# Patient Record
Sex: FEMALE | Race: WHITE | Employment: UNEMPLOYED | URBAN - METROPOLITAN AREA
[De-identification: names, ages, dates, MRNs, and addresses within clinical notes are randomized per-mention and may not be internally consistent; named-entity substitution may affect disease eponyms.]

---

## 2017-08-10 ENCOUNTER — OFFICE VISIT (OUTPATIENT)
Dept: PRIMARY CARE CLINIC | Age: 42
End: 2017-08-10
Payer: MEDICARE

## 2017-08-10 VITALS
HEART RATE: 77 BPM | DIASTOLIC BLOOD PRESSURE: 77 MMHG | SYSTOLIC BLOOD PRESSURE: 102 MMHG | WEIGHT: 161.6 LBS | TEMPERATURE: 97.8 F | BODY MASS INDEX: 26.89 KG/M2 | RESPIRATION RATE: 20 BRPM

## 2017-08-10 DIAGNOSIS — E78.5 DYSLIPIDEMIA: ICD-10-CM

## 2017-08-10 DIAGNOSIS — K21.9 GASTROESOPHAGEAL REFLUX DISEASE, ESOPHAGITIS PRESENCE NOT SPECIFIED: ICD-10-CM

## 2017-08-10 DIAGNOSIS — R13.10 DYSPHAGIA, UNSPECIFIED TYPE: Primary | ICD-10-CM

## 2017-08-10 DIAGNOSIS — Z23 NEED FOR 23-POLYVALENT PNEUMOCOCCAL POLYSACCHARIDE VACCINE: ICD-10-CM

## 2017-08-10 DIAGNOSIS — Z90.710 H/O: HYSTERECTOMY: ICD-10-CM

## 2017-08-10 DIAGNOSIS — Z12.39 BREAST CANCER SCREENING: ICD-10-CM

## 2017-08-10 DIAGNOSIS — Z72.0 TOBACCO ABUSE: ICD-10-CM

## 2017-08-10 DIAGNOSIS — Z80.0 FAMILY HISTORY OF THROAT CANCER: ICD-10-CM

## 2017-08-10 PROCEDURE — 90732 PPSV23 VACC 2 YRS+ SUBQ/IM: CPT | Performed by: NURSE PRACTITIONER

## 2017-08-10 PROCEDURE — 99213 OFFICE O/P EST LOW 20 MIN: CPT | Performed by: NURSE PRACTITIONER

## 2017-08-10 PROCEDURE — 90471 IMMUNIZATION ADMIN: CPT | Performed by: NURSE PRACTITIONER

## 2017-08-10 RX ORDER — OMEPRAZOLE 20 MG/1
20 CAPSULE, DELAYED RELEASE ORAL DAILY
Qty: 30 CAPSULE | Refills: 3 | Status: SHIPPED | OUTPATIENT
Start: 2017-08-10 | End: 2017-09-20 | Stop reason: SDUPTHER

## 2017-08-10 RX ORDER — BUPROPION HYDROCHLORIDE 150 MG/1
150 TABLET, EXTENDED RELEASE ORAL 2 TIMES DAILY
Qty: 60 TABLET | Refills: 3 | Status: SHIPPED | OUTPATIENT
Start: 2017-08-10 | End: 2017-09-20 | Stop reason: SDUPTHER

## 2017-08-10 ASSESSMENT — ENCOUNTER SYMPTOMS
GASTROINTESTINAL NEGATIVE: 1
CHEST TIGHTNESS: 0
STRIDOR: 0
COUGH: 1
WHEEZING: 0
EYES NEGATIVE: 1
SORE THROAT: 0
SHORTNESS OF BREATH: 0
TROUBLE SWALLOWING: 1

## 2017-08-10 ASSESSMENT — PATIENT HEALTH QUESTIONNAIRE - PHQ9
SUM OF ALL RESPONSES TO PHQ9 QUESTIONS 1 & 2: 0
2. FEELING DOWN, DEPRESSED OR HOPELESS: 0
SUM OF ALL RESPONSES TO PHQ QUESTIONS 1-9: 0
1. LITTLE INTEREST OR PLEASURE IN DOING THINGS: 0

## 2017-09-18 ENCOUNTER — TELEPHONE (OUTPATIENT)
Dept: PRIMARY CARE CLINIC | Age: 42
End: 2017-09-18

## 2017-09-19 ENCOUNTER — HOSPITAL ENCOUNTER (OUTPATIENT)
Age: 42
Discharge: HOME | End: 2017-09-19
Payer: MEDICARE

## 2017-09-19 DIAGNOSIS — E78.5 DYSLIPIDEMIA: ICD-10-CM

## 2017-09-19 DIAGNOSIS — Z72.0 TOBACCO ABUSE: ICD-10-CM

## 2017-09-19 LAB
ABSOLUTE EOS #: 0.4 K/UL (ref 0–0.4)
ABSOLUTE LYMPH #: 2.1 K/UL (ref 1–4.8)
ABSOLUTE MONO #: 0.5 K/UL (ref 0–1)
ALBUMIN SERPL-MCNC: 4.2 G/DL (ref 3.5–5.2)
ALBUMIN/GLOBULIN RATIO: 1.4 (ref 1–2.5)
ALP BLD-CCNC: 101 U/L (ref 35–104)
ALT SERPL-CCNC: 14 U/L (ref 5–33)
ANION GAP SERPL CALCULATED.3IONS-SCNC: 11 MMOL/L (ref 9–17)
AST SERPL-CCNC: 17 U/L
BASOPHILS # BLD: 1 %
BASOPHILS ABSOLUTE: 0.1 K/UL (ref 0–0.2)
BILIRUB SERPL-MCNC: 0.54 MG/DL (ref 0.3–1.2)
BILIRUBIN URINE: NEGATIVE
BUN BLDV-MCNC: 8 MG/DL (ref 6–20)
BUN/CREAT BLD: 10 (ref 9–20)
CALCIUM SERPL-MCNC: 9.4 MG/DL (ref 8.6–10.4)
CHLORIDE BLD-SCNC: 107 MMOL/L (ref 98–107)
CHOLESTEROL/HDL RATIO: 5.4
CHOLESTEROL: 225 MG/DL
CO2: 25 MMOL/L (ref 20–31)
COLOR: YELLOW
COMMENT UA: NORMAL
CREAT SERPL-MCNC: 0.81 MG/DL (ref 0.5–0.9)
CREATININE URINE: 76.6 MG/DL (ref 28–217)
DIFFERENTIAL TYPE: NORMAL
EOSINOPHILS RELATIVE PERCENT: 5 %
GFR AFRICAN AMERICAN: >60 ML/MIN
GFR NON-AFRICAN AMERICAN: >60 ML/MIN
GFR SERPL CREATININE-BSD FRML MDRD: NORMAL ML/MIN/{1.73_M2}
GFR SERPL CREATININE-BSD FRML MDRD: NORMAL ML/MIN/{1.73_M2}
GLUCOSE BLD-MCNC: 91 MG/DL (ref 70–99)
GLUCOSE URINE: NEGATIVE
HCT VFR BLD CALC: 39.8 % (ref 36–46)
HDLC SERPL-MCNC: 42 MG/DL
HEMOGLOBIN: 13.5 G/DL (ref 12–16)
KETONES, URINE: NEGATIVE
LDL CHOLESTEROL: 155 MG/DL (ref 0–130)
LEUKOCYTE ESTERASE, URINE: NEGATIVE
LYMPHOCYTES # BLD: 29 %
MCH RBC QN AUTO: 31.4 PG (ref 26–34)
MCHC RBC AUTO-ENTMCNC: 33.9 G/DL (ref 31–37)
MCV RBC AUTO: 92.6 FL (ref 80–100)
MICROALBUMIN/CREAT 24H UR: <12 MG/L
MICROALBUMIN/CREAT UR-RTO: 16 MCG/MG CREAT
MONOCYTES # BLD: 7 %
NITRITE, URINE: NEGATIVE
PDW BLD-RTO: 13.8 % (ref 12.1–15.2)
PH UA: 7 (ref 5–9)
PLATELET # BLD: 249 K/UL (ref 140–450)
PLATELET ESTIMATE: NORMAL
PMV BLD AUTO: 8.4 FL (ref 6–12)
POTASSIUM SERPL-SCNC: 5 MMOL/L (ref 3.7–5.3)
PROTEIN UA: NEGATIVE
RBC # BLD: 4.3 M/UL (ref 4–5.2)
RBC # BLD: NORMAL 10*6/UL
SEG NEUTROPHILS: 58 %
SEGMENTED NEUTROPHILS ABSOLUTE COUNT: 4.2 K/UL (ref 1.8–7.7)
SODIUM BLD-SCNC: 143 MMOL/L (ref 135–144)
SPECIFIC GRAVITY UA: 1.01 (ref 1.01–1.02)
TOTAL PROTEIN: 7.3 G/DL (ref 6.4–8.3)
TRIGL SERPL-MCNC: 141 MG/DL
TSH SERPL DL<=0.05 MIU/L-ACNC: 1.4 MIU/L (ref 0.3–5)
TURBIDITY: CLEAR
URINE HGB: NEGATIVE
UROBILINOGEN, URINE: NORMAL
VLDLC SERPL CALC-MCNC: ABNORMAL MG/DL (ref 1–30)
WBC # BLD: 7.2 K/UL (ref 3.5–11)
WBC # BLD: NORMAL 10*3/UL

## 2017-09-19 PROCEDURE — 82570 ASSAY OF URINE CREATININE: CPT

## 2017-09-19 PROCEDURE — 80053 COMPREHEN METABOLIC PANEL: CPT

## 2017-09-19 PROCEDURE — 85025 COMPLETE CBC W/AUTO DIFF WBC: CPT

## 2017-09-19 PROCEDURE — 36415 COLL VENOUS BLD VENIPUNCTURE: CPT

## 2017-09-19 PROCEDURE — 81003 URINALYSIS AUTO W/O SCOPE: CPT

## 2017-09-19 PROCEDURE — 82043 UR ALBUMIN QUANTITATIVE: CPT

## 2017-09-19 PROCEDURE — 80061 LIPID PANEL: CPT

## 2017-09-19 PROCEDURE — 84443 ASSAY THYROID STIM HORMONE: CPT

## 2017-09-20 ENCOUNTER — OFFICE VISIT (OUTPATIENT)
Dept: PRIMARY CARE CLINIC | Age: 42
End: 2017-09-20
Payer: MEDICARE

## 2017-09-20 VITALS
RESPIRATION RATE: 20 BRPM | TEMPERATURE: 97.5 F | DIASTOLIC BLOOD PRESSURE: 76 MMHG | HEART RATE: 71 BPM | SYSTOLIC BLOOD PRESSURE: 113 MMHG | WEIGHT: 162.1 LBS | BODY MASS INDEX: 26.97 KG/M2

## 2017-09-20 DIAGNOSIS — R13.10 DYSPHAGIA, UNSPECIFIED TYPE: ICD-10-CM

## 2017-09-20 DIAGNOSIS — E78.5 DYSLIPIDEMIA: Primary | ICD-10-CM

## 2017-09-20 DIAGNOSIS — J45.20 MILD INTERMITTENT ASTHMA WITHOUT COMPLICATION: ICD-10-CM

## 2017-09-20 DIAGNOSIS — K21.9 GASTROESOPHAGEAL REFLUX DISEASE, ESOPHAGITIS PRESENCE NOT SPECIFIED: ICD-10-CM

## 2017-09-20 DIAGNOSIS — Z72.0 TOBACCO ABUSE: ICD-10-CM

## 2017-09-20 PROCEDURE — 99213 OFFICE O/P EST LOW 20 MIN: CPT | Performed by: NURSE PRACTITIONER

## 2017-09-20 RX ORDER — BUPROPION HYDROCHLORIDE 150 MG/1
150 TABLET, EXTENDED RELEASE ORAL 2 TIMES DAILY
Qty: 60 TABLET | Refills: 3 | Status: SHIPPED | OUTPATIENT
Start: 2017-09-20 | End: 2018-08-01 | Stop reason: ALTCHOICE

## 2017-09-20 RX ORDER — OMEPRAZOLE 20 MG/1
20 CAPSULE, DELAYED RELEASE ORAL DAILY
Qty: 30 CAPSULE | Refills: 3 | Status: SHIPPED | OUTPATIENT
Start: 2017-09-20 | End: 2017-12-10 | Stop reason: ALTCHOICE

## 2017-09-20 RX ORDER — ALBUTEROL SULFATE 90 UG/1
2 AEROSOL, METERED RESPIRATORY (INHALATION) EVERY 6 HOURS PRN
Qty: 1 INHALER | Refills: 3 | Status: SHIPPED | OUTPATIENT
Start: 2017-09-20 | End: 2019-12-12

## 2017-09-20 ASSESSMENT — ENCOUNTER SYMPTOMS
RESPIRATORY NEGATIVE: 1
ABDOMINAL PAIN: 0
EYES NEGATIVE: 1
SORE THROAT: 0
DIARRHEA: 0
SHORTNESS OF BREATH: 0
TROUBLE SWALLOWING: 1
NAUSEA: 0
COUGH: 0
VOMITING: 0
SWOLLEN GLANDS: 0
VOICE CHANGE: 0
WHEEZING: 0
CONSTIPATION: 0
GASTROINTESTINAL NEGATIVE: 1

## 2017-11-24 ENCOUNTER — APPOINTMENT (OUTPATIENT)
Dept: GENERAL RADIOLOGY | Age: 42
End: 2017-11-24
Payer: MEDICARE

## 2017-11-24 ENCOUNTER — HOSPITAL ENCOUNTER (EMERGENCY)
Age: 42
Discharge: HOME | End: 2017-11-24
Payer: MEDICARE

## 2017-11-24 VITALS
HEART RATE: 81 BPM | TEMPERATURE: 98 F | DIASTOLIC BLOOD PRESSURE: 71 MMHG | OXYGEN SATURATION: 95 % | SYSTOLIC BLOOD PRESSURE: 110 MMHG | RESPIRATION RATE: 16 BRPM

## 2017-11-24 DIAGNOSIS — J40 BRONCHITIS: Primary | ICD-10-CM

## 2017-11-24 PROCEDURE — 99283 EMERGENCY DEPT VISIT LOW MDM: CPT

## 2017-11-24 PROCEDURE — 71020 XR CHEST STANDARD TWO VW: CPT

## 2017-11-24 RX ORDER — PREDNISONE 10 MG/1
20 TABLET ORAL 2 TIMES DAILY
Qty: 20 TABLET | Refills: 0 | Status: SHIPPED | OUTPATIENT
Start: 2017-11-24 | End: 2017-11-29

## 2017-11-24 RX ORDER — AZITHROMYCIN 500 MG/1
500 TABLET, FILM COATED ORAL DAILY
Qty: 10 TABLET | Refills: 0 | Status: SHIPPED | OUTPATIENT
Start: 2017-11-24 | End: 2017-12-04

## 2017-11-24 ASSESSMENT — ENCOUNTER SYMPTOMS
TROUBLE SWALLOWING: 0
COUGH: 1
SINUS PAIN: 0
SINUS PRESSURE: 0
ABDOMINAL DISTENTION: 0
SHORTNESS OF BREATH: 1
DIARRHEA: 0
NAUSEA: 0
VOMITING: 0
SORE THROAT: 1
WHEEZING: 1
EYE PAIN: 0
CONSTIPATION: 0
ABDOMINAL PAIN: 0

## 2017-11-24 ASSESSMENT — PAIN DESCRIPTION - PAIN TYPE: TYPE: ACUTE PAIN

## 2017-11-24 ASSESSMENT — PAIN SCALES - GENERAL: PAINLEVEL_OUTOF10: 5

## 2017-11-24 ASSESSMENT — PAIN DESCRIPTION - LOCATION: LOCATION: RIB CAGE

## 2017-11-25 NOTE — ED PROVIDER NOTES
vaginally daily. , Vaginal, DAILY Starting 12/23/2014, Until Discontinued, Disp-1 Tube, R-3, Normal      FLUVIRIN PRESERVATIVE FREE SUSP R-0             ALLERGIES     Codeine    FAMILY HISTORY       Family History   Problem Relation Age of Onset    Diabetes Mother     Thyroid Disease Mother     Arthritis Mother     Cancer Father     Stroke Father     Asthma Father     Emphysema Father     Seizures Sister         SOCIAL HISTORY       Social History     Social History    Marital status:      Spouse name: N/A    Number of children: N/A    Years of education: N/A     Social History Main Topics    Smoking status: Current Every Day Smoker     Packs/day: 0.50     Years: 11.00     Types: Cigarettes    Smokeless tobacco: Never Used    Alcohol use No    Drug use: No    Sexual activity: Not Asked     Other Topics Concern    None     Social History Narrative    None       REVIEW OF SYSTEMS       Review of Systems   Constitutional: Positive for activity change and fatigue. Negative for chills, fever and unexpected weight change. HENT: Positive for congestion and sore throat. Negative for ear pain, sinus pain, sinus pressure and trouble swallowing. Eyes: Negative for pain. Respiratory: Positive for cough, shortness of breath and wheezing. Cardiovascular: Negative for chest pain and palpitations. Gastrointestinal: Negative for abdominal distention, abdominal pain, constipation, diarrhea, nausea and vomiting. Endocrine: Negative for polydipsia and polyuria. Genitourinary: Negative for difficulty urinating, dysuria, flank pain, frequency, hematuria, pelvic pain, urgency, vaginal bleeding and vaginal discharge. Musculoskeletal: Negative for myalgias. Skin: Negative for rash. Allergic/Immunologic: Negative for immunocompromised state. Neurological: Negative for dizziness and headaches. Hematological: Negative for adenopathy. Does not bruise/bleed easily.    Psychiatric/Behavioral: cardiologist.    RADIOLOGY: (none if blank)   Interpretation per the Radiologist below, if available at the time of this note:    XR CHEST STANDARD (2 VW)   Final Result   Impression:     Unremarkable chest x-ray. Electronically Signed By: Brie Gomez   on  11/24/2017  19:39          LABS:  Labs Reviewed - No data to display    All other labs were within normal range or not returned as of this dictation. EMERGENCY DEPARTMENT COURSE and Medical Decision Making:     MDM/  ED Course      CXR negative for pneumonia. Will treat for bronchitis with steroids and Z mendez. Pt has inhaler at home, instructed to use as needed. Recommended she quit smoking. F/u with PCP. Return to ED if symptoms worsen. Strict return precautions and follow up instructions were discussed with the patient with which the patient agrees    CONSULTS: (None if blank)  None    Procedures: (None if blank)       CLINICAL IMPRESSION      1.  Bronchitis          DISPOSITION/PLAN   DISPOSITION Decision to Discharge    PATIENT REFERRED TO:  Chaparro Franco NP  [address] [place] [address]  [phone]      call to schedule follow up in 3-5 days      DISCHARGE MEDICATIONS:  Discharge Medication List as of 11/24/2017  7:56 PM      START taking these medications    Details   azithromycin (ZITHROMAX TRI-MENDEZ) 500 MG tablet Take 1 tablet by mouth daily for 10 days, Disp-10 tablet, R-0Print      predniSONE (DELTASONE) 10 MG tablet Take 2 tablets by mouth 2 times daily for 5 days, Disp-20 tablet, R-0Print                    (Please note that portions of this note were completed with a voice recognition program.  Efforts were made to edit the dictations but occasionally words are mis-transcribed.)      Electronically signed by Елена Akhtar PA-C on 11/24/17 at 8:00 PM                Елена Akhtar PA-C  11/24/17 2003       Елена Akhtar PA-C  11/24/17 2004

## 2017-12-10 ENCOUNTER — APPOINTMENT (OUTPATIENT)
Dept: GENERAL RADIOLOGY | Age: 42
End: 2017-12-10
Payer: MEDICARE

## 2017-12-10 ENCOUNTER — HOSPITAL ENCOUNTER (EMERGENCY)
Age: 42
Discharge: HOME | End: 2017-12-10
Payer: MEDICARE

## 2017-12-10 VITALS
HEART RATE: 75 BPM | OXYGEN SATURATION: 99 % | DIASTOLIC BLOOD PRESSURE: 68 MMHG | SYSTOLIC BLOOD PRESSURE: 99 MMHG | WEIGHT: 165 LBS | BODY MASS INDEX: 27.46 KG/M2 | RESPIRATION RATE: 16 BRPM | TEMPERATURE: 97.1 F

## 2017-12-10 DIAGNOSIS — J40 BRONCHITIS: ICD-10-CM

## 2017-12-10 DIAGNOSIS — R07.89 CHEST WALL PAIN: Primary | ICD-10-CM

## 2017-12-10 DIAGNOSIS — R09.1 PLEURISY: ICD-10-CM

## 2017-12-10 LAB
ABSOLUTE EOS #: 0.5 K/UL (ref 0–0.4)
ABSOLUTE IMMATURE GRANULOCYTE: ABNORMAL K/UL (ref 0–0.3)
ABSOLUTE LYMPH #: 1.9 K/UL (ref 1–4.8)
ABSOLUTE MONO #: 0.4 K/UL (ref 0–1)
ANION GAP SERPL CALCULATED.3IONS-SCNC: 10 MMOL/L (ref 9–17)
BASOPHILS # BLD: 0 % (ref 0–2)
BASOPHILS ABSOLUTE: 0 K/UL (ref 0–0.2)
BNP INTERPRETATION: NORMAL
BUN BLDV-MCNC: 11 MG/DL (ref 6–20)
BUN/CREAT BLD: 13 (ref 9–20)
CALCIUM SERPL-MCNC: 9 MG/DL (ref 8.6–10.4)
CHLORIDE BLD-SCNC: 104 MMOL/L (ref 98–107)
CO2: 27 MMOL/L (ref 20–31)
CREAT SERPL-MCNC: 0.83 MG/DL (ref 0.5–0.9)
D-DIMER QUANTITATIVE: 0.3 MG/L FEU (ref 0.19–0.5)
DIFFERENTIAL TYPE: ABNORMAL
EKG ATRIAL RATE: 61 BPM
EKG ATRIAL RATE: 66 BPM
EKG P AXIS: 41 DEGREES
EKG P AXIS: 48 DEGREES
EKG P-R INTERVAL: 154 MS
EKG P-R INTERVAL: 156 MS
EKG Q-T INTERVAL: 404 MS
EKG Q-T INTERVAL: 440 MS
EKG QRS DURATION: 82 MS
EKG QRS DURATION: 84 MS
EKG QTC CALCULATION (BAZETT): 423 MS
EKG QTC CALCULATION (BAZETT): 442 MS
EKG R AXIS: 36 DEGREES
EKG R AXIS: 40 DEGREES
EKG T AXIS: 29 DEGREES
EKG T AXIS: 34 DEGREES
EKG VENTRICULAR RATE: 61 BPM
EKG VENTRICULAR RATE: 66 BPM
EOSINOPHILS RELATIVE PERCENT: 5 % (ref 0–8)
GFR AFRICAN AMERICAN: >60 ML/MIN
GFR NON-AFRICAN AMERICAN: >60 ML/MIN
GFR SERPL CREATININE-BSD FRML MDRD: ABNORMAL ML/MIN/{1.73_M2}
GFR SERPL CREATININE-BSD FRML MDRD: ABNORMAL ML/MIN/{1.73_M2}
GLUCOSE BLD-MCNC: 103 MG/DL (ref 70–99)
HCT VFR BLD CALC: 39.1 % (ref 36–46)
HEMOGLOBIN: 12.9 G/DL (ref 12–16)
IMMATURE GRANULOCYTES: ABNORMAL %
INR BLD: 1 (ref 0.9–1.2)
LYMPHOCYTES # BLD: 20 % (ref 24–44)
MCH RBC QN AUTO: 31.2 PG (ref 26–34)
MCHC RBC AUTO-ENTMCNC: 33.1 G/DL (ref 31–37)
MCV RBC AUTO: 94.2 FL (ref 80–100)
MONOCYTES # BLD: 5 % (ref 0–12)
PARTIAL THROMBOPLASTIN TIME: 29.2 SEC (ref 23.2–34.4)
PDW BLD-RTO: 13.3 % (ref 12.1–15.2)
PLATELET # BLD: 246 K/UL (ref 140–450)
PLATELET ESTIMATE: ABNORMAL
PMV BLD AUTO: 8.1 FL (ref 6–12)
POTASSIUM SERPL-SCNC: 4.3 MMOL/L (ref 3.7–5.3)
PRO-BNP: 164 PG/ML
PROTHROMBIN TIME: 10.7 SEC (ref 9.7–12.2)
RBC # BLD: 4.15 M/UL (ref 4–5.2)
RBC # BLD: ABNORMAL 10*6/UL
SEG NEUTROPHILS: 70 % (ref 36–66)
SEGMENTED NEUTROPHILS ABSOLUTE COUNT: 6.7 K/UL (ref 1.8–7.7)
SODIUM BLD-SCNC: 141 MMOL/L (ref 135–144)
TROPONIN INTERP: NORMAL
TROPONIN INTERP: NORMAL
TROPONIN T: <0.03 NG/ML
TROPONIN T: <0.03 NG/ML
WBC # BLD: 9.6 K/UL (ref 3.5–11)
WBC # BLD: ABNORMAL 10*3/UL

## 2017-12-10 PROCEDURE — 96374 THER/PROPH/DIAG INJ IV PUSH: CPT

## 2017-12-10 PROCEDURE — 83880 ASSAY OF NATRIURETIC PEPTIDE: CPT

## 2017-12-10 PROCEDURE — 6370000000 HC RX 637 (ALT 250 FOR IP): Performed by: PHYSICIAN ASSISTANT

## 2017-12-10 PROCEDURE — 71010 XR CHEST PORTABLE: CPT

## 2017-12-10 PROCEDURE — 99285 EMERGENCY DEPT VISIT HI MDM: CPT

## 2017-12-10 PROCEDURE — 80048 BASIC METABOLIC PNL TOTAL CA: CPT

## 2017-12-10 PROCEDURE — 96372 THER/PROPH/DIAG INJ SC/IM: CPT

## 2017-12-10 PROCEDURE — 84484 ASSAY OF TROPONIN QUANT: CPT

## 2017-12-10 PROCEDURE — 85730 THROMBOPLASTIN TIME PARTIAL: CPT

## 2017-12-10 PROCEDURE — 85610 PROTHROMBIN TIME: CPT

## 2017-12-10 PROCEDURE — 85025 COMPLETE CBC W/AUTO DIFF WBC: CPT

## 2017-12-10 PROCEDURE — 6360000002 HC RX W HCPCS

## 2017-12-10 PROCEDURE — 96375 TX/PRO/DX INJ NEW DRUG ADDON: CPT

## 2017-12-10 PROCEDURE — 93005 ELECTROCARDIOGRAM TRACING: CPT

## 2017-12-10 PROCEDURE — 6360000002 HC RX W HCPCS: Performed by: PHYSICIAN ASSISTANT

## 2017-12-10 PROCEDURE — 85379 FIBRIN DEGRADATION QUANT: CPT

## 2017-12-10 PROCEDURE — 36415 COLL VENOUS BLD VENIPUNCTURE: CPT

## 2017-12-10 RX ORDER — ORPHENADRINE CITRATE 30 MG/ML
60 INJECTION INTRAMUSCULAR; INTRAVENOUS ONCE
Status: COMPLETED | OUTPATIENT
Start: 2017-12-10 | End: 2017-12-10

## 2017-12-10 RX ORDER — AZITHROMYCIN 250 MG/1
TABLET, FILM COATED ORAL
Qty: 1 PACKET | Refills: 0 | Status: SHIPPED | OUTPATIENT
Start: 2017-12-10 | End: 2017-12-20

## 2017-12-10 RX ORDER — METHOCARBAMOL 750 MG/1
750 TABLET, FILM COATED ORAL 4 TIMES DAILY
Qty: 20 TABLET | Refills: 0 | Status: SHIPPED | OUTPATIENT
Start: 2017-12-10 | End: 2017-12-15

## 2017-12-10 RX ORDER — KETOROLAC TROMETHAMINE 30 MG/ML
30 INJECTION, SOLUTION INTRAMUSCULAR; INTRAVENOUS ONCE
Status: COMPLETED | OUTPATIENT
Start: 2017-12-10 | End: 2017-12-10

## 2017-12-10 RX ORDER — MORPHINE SULFATE 10 MG/ML
INJECTION, SOLUTION INTRAMUSCULAR; INTRAVENOUS
Status: COMPLETED
Start: 2017-12-10 | End: 2017-12-10

## 2017-12-10 RX ORDER — AZITHROMYCIN 250 MG/1
500 TABLET, FILM COATED ORAL ONCE
Status: COMPLETED | OUTPATIENT
Start: 2017-12-10 | End: 2017-12-10

## 2017-12-10 RX ORDER — MORPHINE SULFATE 2 MG/ML
4 INJECTION, SOLUTION INTRAMUSCULAR; INTRAVENOUS ONCE
Status: DISCONTINUED | OUTPATIENT
Start: 2017-12-10 | End: 2017-12-10 | Stop reason: HOSPADM

## 2017-12-10 RX ORDER — PREDNISONE 50 MG/1
50 TABLET ORAL DAILY
Qty: 4 TABLET | Refills: 0 | Status: SHIPPED | OUTPATIENT
Start: 2017-12-10 | End: 2018-08-01 | Stop reason: ALTCHOICE

## 2017-12-10 RX ORDER — DIAZEPAM 5 MG/ML
5 INJECTION, SOLUTION INTRAMUSCULAR; INTRAVENOUS ONCE
Status: DISCONTINUED | OUTPATIENT
Start: 2017-12-10 | End: 2017-12-10

## 2017-12-10 RX ORDER — DIAZEPAM 5 MG/ML
5 INJECTION, SOLUTION INTRAMUSCULAR; INTRAVENOUS EVERY 4 HOURS PRN
Status: DISCONTINUED | OUTPATIENT
Start: 2017-12-10 | End: 2017-12-10 | Stop reason: CLARIF

## 2017-12-10 RX ADMIN — KETOROLAC TROMETHAMINE 30 MG: 30 INJECTION, SOLUTION INTRAMUSCULAR at 17:34

## 2017-12-10 RX ADMIN — AZITHROMYCIN 500 MG: 250 TABLET, FILM COATED ORAL at 21:35

## 2017-12-10 RX ADMIN — ORPHENADRINE CITRATE 60 MG: 30 INJECTION INTRAMUSCULAR; INTRAVENOUS at 18:54

## 2017-12-10 RX ADMIN — MORPHINE SULFATE 10 MG: 10 INJECTION, SOLUTION INTRAMUSCULAR; INTRAVENOUS at 19:37

## 2017-12-10 ASSESSMENT — ENCOUNTER SYMPTOMS
RHINORRHEA: 0
SHORTNESS OF BREATH: 1
NAUSEA: 0
BACK PAIN: 0
VOMITING: 0
WHEEZING: 0
EYE PAIN: 0
EYE ITCHING: 0
ABDOMINAL PAIN: 0
SORE THROAT: 0
COUGH: 1
DIARRHEA: 0

## 2017-12-10 ASSESSMENT — PAIN SCALES - GENERAL
PAINLEVEL_OUTOF10: 7
PAINLEVEL_OUTOF10: 8
PAINLEVEL_OUTOF10: 8
PAINLEVEL_OUTOF10: 0

## 2017-12-10 ASSESSMENT — PAIN DESCRIPTION - PAIN TYPE: TYPE: ACUTE PAIN

## 2017-12-10 ASSESSMENT — PAIN DESCRIPTION - ORIENTATION: ORIENTATION: MID;ANTERIOR

## 2017-12-10 ASSESSMENT — PAIN DESCRIPTION - LOCATION: LOCATION: CHEST

## 2017-12-10 ASSESSMENT — PAIN DESCRIPTION - DESCRIPTORS: DESCRIPTORS: STABBING

## 2017-12-10 NOTE — ED NOTES
This RN called and spoke with the pharmacist at [facility]. He states we do not have any Valium in this facility, it is on manufacture's backorder.  Cate Katz is aware       Gal Cornelius RN  12/10/17 [id number]

## 2017-12-10 NOTE — ED PROVIDER NOTES
[facility] ED  eMERGENCY dEPARTMENT eNCOUnter      Pt Name: Dev Condon  MRN: [id number]  [place] 1975  Date of evaluation: 12/10/2017  Provider: Judith Smith PA-C    CHIEF COMPLAINT       Chief Complaint   Patient presents with    Chest Pain     MSCP onset x 3 days. radiating into her left arm and back       HISTORY OF PRESENT ILLNESS    Barb Loaiza is a 38 y.o. female who presents to the emergency department from home With complaints of cough and shortness of breath and chest pain. Patient states she's had a cough for several days. Cough is nonproductive. She states that she started developing chest pain on Friday. This chart. It's worse when she coughs or breathes. She states is also worse when she moves that when she sitting up. Patient denies fevers or chills. Patient denies any vomiting diarrhea. Triage notes and Nursing notes were reviewed by myself. Any discrepancies are addressed above.     PAST MEDICAL HISTORY     Past Medical History:   Diagnosis Date    Depression     Headache(784.0)     Hypotension     Vasodepressor syncope        SURGICAL HISTORY       Past Surgical History:   Procedure Laterality Date    HYSTERECTOMY      TUBAL LIGATION         CURRENT MEDICATIONS       Previous Medications    ALBUTEROL SULFATE HFA (PROAIR HFA) 108 (90 BASE) MCG/ACT INHALER    Inhale 2 puffs into the lungs every 6 hours as needed for Wheezing    BUPROPION (WELLBUTRIN SR) 150 MG EXTENDED RELEASE TABLET    Take 1 tablet by mouth 2 times daily Start with one tab daily for 3 days then increase to one tab twice daily    ESTRADIOL (ESTRACE) 1 MG TABLET    Take 1 tablet by mouth daily    FLUVIRIN PRESERVATIVE FREE SUSP        MELOXICAM (MOBIC) 15 MG TABLET    Take 1 tablet by mouth daily       ALLERGIES     Codeine    FAMILY HISTORY       Family History   Problem Relation Age of Onset    Diabetes Mother     Thyroid Disease Mother     Arthritis Mother     Cancer Father     Stroke Father     Asthma Father     Emphysema Father     Seizures Sister         SOCIAL HISTORY       Social History     Social History    Marital status:      Spouse name: N/A    Number of children: N/A    Years of education: N/A     Social History Main Topics    Smoking status: Current Every Day Smoker     Packs/day: 0.50     Years: 11.00     Types: Cigarettes    Smokeless tobacco: Never Used    Alcohol use No    Drug use: No    Sexual activity: Not Asked     Other Topics Concern    None     Social History Narrative    None       REVIEW OF SYSTEMS       Review of Systems   Constitutional: Negative for appetite change, chills and fever. HENT: Negative for congestion, ear pain, mouth sores, rhinorrhea and sore throat. Eyes: Negative for pain and itching. Respiratory: Positive for cough and shortness of breath. Negative for wheezing. Cardiovascular: Positive for chest pain. Gastrointestinal: Negative for abdominal pain, diarrhea, nausea and vomiting. Endocrine: Negative for polyuria. Genitourinary: Negative for dysuria, frequency, hematuria, urgency and vaginal bleeding. Musculoskeletal: Negative for back pain and neck pain. Skin: Negative for rash. Neurological: Negative for headaches. All other systems reviewed and are negative. Except as noted above the remainder of the review of systems was reviewed and is negative. PHYSICAL EXAM    (up to 7 for level 4, 8 or more for level 5)     ED Triage Vitals [12/10/17 1611]   BP Temp Temp Source Pulse Resp SpO2 Height Weight   -- 97.1 °F (36.2 °C) Tympanic 75 16 99 % -- 165 lb (74.8 kg)       Physical Exam   Constitutional: She is oriented to person, place, and time. She appears well-developed and well-nourished. No distress. HENT:   Head: Normocephalic and atraumatic.    Right Ear: External ear normal.   Left Ear: External ear normal.   Mouth/Throat: Oropharynx is clear and moist.   Eyes: Conjunctivae and EOM are normal. Pupils Eos # 0.50 (*)     All other components within normal limits   APTT   BRAIN NATRIURETIC PEPTIDE   PROTIME-INR   TROPONIN   D-DIMER, QUANTITATIVE   TROPONIN       All other labs were within normal range or not returned as of this dictation. EMERGENCY DEPARTMENT COURSE and Medical Decision Making:     MDM/  ED Course      Patient is a CASSANDRA score of 0 and a heart score of 0. She received Toradol and Norflex for her pain. She did report improvement with medication. She still had some pain. She has received some morphine. Patient was given the results. Strict return precautions and follow up instructions were discussed with the patient with which the patient agrees    CONSULTS: (None if blank)  None    Procedures: (None if blank)       CLINICAL IMPRESSION      1. Chest wall pain    2. Bronchitis    3. Pleurisy          DISPOSITION/PLAN   DISPOSITION Discharge - Pending Orders Complete    PATIENT REFERRED TO:  Alisa Wheeler NP  [address] [place] [id number] [phone]    In 3 days        DISCHARGE MEDICATIONS:  New Prescriptions    AZITHROMYCIN (ZITHROMAX) 250 MG TABLET    Take 2 tablets (500 mg) on Day 1, followed by 1 tablet (250 mg) once daily on Days 2 through 5.     ETODOLAC (LODINE) 300 MG CAPSULE    Take 1 capsule by mouth every 8 hours    METHOCARBAMOL (ROBAXIN) 750 MG TABLET    Take 1 tablet by mouth 4 times daily for 5 days    PREDNISONE (DELTASONE) 50 MG TABLET    Take 1 tablet by mouth daily              (Please note that portions of this note were completed with a voice recognition program.  Efforts were made to edit the dictations but occasionally words are mis-transcribed.)      Walt Smith PA-C (electronically signed)  Attending Emergency Physician            Walt Smith PA-C  12/10/17 0617

## 2017-12-14 ENCOUNTER — OFFICE VISIT (OUTPATIENT)
Dept: PRIMARY CARE CLINIC | Age: 42
End: 2017-12-14
Payer: MEDICARE

## 2017-12-14 VITALS
WEIGHT: 166.6 LBS | HEART RATE: 68 BPM | RESPIRATION RATE: 20 BRPM | TEMPERATURE: 97.8 F | DIASTOLIC BLOOD PRESSURE: 70 MMHG | SYSTOLIC BLOOD PRESSURE: 95 MMHG | BODY MASS INDEX: 27.72 KG/M2

## 2017-12-14 DIAGNOSIS — R09.1 PLEURISY: ICD-10-CM

## 2017-12-14 DIAGNOSIS — Z23 INFLUENZA VACCINE NEEDED: Primary | ICD-10-CM

## 2017-12-14 DIAGNOSIS — J40 BRONCHITIS: ICD-10-CM

## 2017-12-14 PROCEDURE — 90471 IMMUNIZATION ADMIN: CPT | Performed by: NURSE PRACTITIONER

## 2017-12-14 PROCEDURE — G8427 DOCREV CUR MEDS BY ELIG CLIN: HCPCS | Performed by: NURSE PRACTITIONER

## 2017-12-14 PROCEDURE — 4004F PT TOBACCO SCREEN RCVD TLK: CPT | Performed by: NURSE PRACTITIONER

## 2017-12-14 PROCEDURE — 99213 OFFICE O/P EST LOW 20 MIN: CPT | Performed by: NURSE PRACTITIONER

## 2017-12-14 PROCEDURE — 90686 IIV4 VACC NO PRSV 0.5 ML IM: CPT | Performed by: NURSE PRACTITIONER

## 2017-12-14 PROCEDURE — G8419 CALC BMI OUT NRM PARAM NOF/U: HCPCS | Performed by: NURSE PRACTITIONER

## 2017-12-14 PROCEDURE — G8484 FLU IMMUNIZE NO ADMIN: HCPCS | Performed by: NURSE PRACTITIONER

## 2017-12-14 RX ORDER — OMEPRAZOLE 20 MG/1
CAPSULE, DELAYED RELEASE ORAL
Refills: 0 | COMMUNITY
Start: 2017-09-20 | End: 2018-08-01

## 2017-12-14 RX ORDER — PREDNISONE 10 MG/1
TABLET ORAL
COMMUNITY
Start: 2017-11-24 | End: 2018-08-01 | Stop reason: ALTCHOICE

## 2017-12-14 ASSESSMENT — ENCOUNTER SYMPTOMS
TROUBLE SWALLOWING: 0
WHEEZING: 0
GASTROINTESTINAL NEGATIVE: 1
CHEST TIGHTNESS: 0
EYES NEGATIVE: 1
VOMITING: 0
SHORTNESS OF BREATH: 0
COUGH: 1
STRIDOR: 0

## 2017-12-14 NOTE — PROGRESS NOTES
After obtaining consent, and per orders of Jesi Brown CNP. , injection of Influenza vaccine given IM in Right deltoid by Gustavo Saravia. Patient instructed to remain in clinic for 20 minutes afterwards, and to report any adverse reaction to me immediately.

## 2017-12-14 NOTE — PATIENT INSTRUCTIONS
Patient Education        Bronchitis: Care Instructions  Your Care Instructions    Bronchitis is inflammation of the bronchial tubes, which carry air to the lungs. The tubes swell and produce mucus, or phlegm. The mucus and inflamed bronchial tubes make you cough. You may have trouble breathing. Most cases of bronchitis are caused by viruses like those that cause colds. Antibiotics usually do not help and they may be harmful. Bronchitis usually develops rapidly and lasts about 2 to 3 weeks in otherwise healthy people. Follow-up care is a key part of your treatment and safety. Be sure to make and go to all appointments, and call your doctor if you are having problems. It's also a good idea to know your test results and keep a list of the medicines you take. How can you care for yourself at home? · Take all medicines exactly as prescribed. Call your doctor if you think you are having a problem with your medicine. · Get some extra rest.  · Take an over-the-counter pain medicine, such as acetaminophen (Tylenol), ibuprofen (Advil, Motrin), or naproxen (Aleve) to reduce fever and relieve body aches. Read and follow all instructions on the label. · Do not take two or more pain medicines at the same time unless the doctor told you to. Many pain medicines have acetaminophen, which is Tylenol. Too much acetaminophen (Tylenol) can be harmful. · Take an over-the-counter cough medicine that contains dextromethorphan to help quiet a dry, hacking cough so that you can sleep. Avoid cough medicines that have more than one active ingredient. Read and follow all instructions on the label. · Breathe moist air from a humidifier, hot shower, or sink filled with hot water. The heat and moisture will thin mucus so you can cough it out. · Do not smoke. Smoking can make bronchitis worse. If you need help quitting, talk to your doctor about stop-smoking programs and medicines.  These can increase your chances of quitting for doctor now or seek immediate medical care if:  ? · You have a new or higher fever. ? Watch closely for changes in your health, and be sure to contact your doctor if:  ? · You begin to cough up yellow or green mucus. ? · You cough up blood. ? · Your symptoms are not better in 3 or 4 days. Where can you learn more? Go to [email or web address] and sign in to your [employer] account. Enter F346 in the [employer] box to learn more about \"Pleurisy: Care Instructions. \"     If you do not have an account, please click on the \"Sign Up Now\" link. Current as of: May 12, 2017  Content Version: 11.4  © 0654-8025 Healthwise, Incorporated. Care instructions adapted under license by [facility] ([facility]). If you have questions about a medical condition or this instruction, always ask your healthcare professional. [address] any warranty or liability for your use of this information.

## 2017-12-14 NOTE — PROGRESS NOTES
Name: Roseanne Hussein  : 1975         Chief Complaint:     Chief Complaint   Patient presents with    Chest Pain     ER f/u states pain is still in left upper chest area. States pain is constant and gets sharper with ceetain movements. History of Present Illness:      Roseanne Hussein is a 38 y.o.  female who presents with Chest Pain (ER f/u states pain is still in left upper chest area. States pain is constant and gets sharper with ceetain movements. )    Have you seen any other physician or provider since your last visit yes - ER    Have you had any other diagnostic tests since your last visit? yes -     Have you changed or stopped any medications since your last visit including any over-the-counter medicines, vitamins, or herbal medicines? yes - antibiotics and prednisone     Are you taking all your prescribed medications? Yes  If NO, why? -  N/A           Patient Self-Management Goal for this visit. What is your goal for your visit today? - Maintain/Improve health   Barriers to success: none   Plan for overcoming my barriers: N/A      Confidence: 8/10   Date goal set: 17   Date expected to reach goal: 1month      Health Maintenance Due   Topic Date Due    HIV screen  04/10/1988    DTaP/Tdap/Td vaccine (1 - Tdap) 04/10/1992    Flu vaccine (1) 2017     ED 12/10/17 is on Zpack and steroids he shouldn't states it's improving      Chest Pain    This is a new problem. Episode onset: couple weeks 12/10/17. The onset quality is sudden. Episode frequency: ER f/u states pain is still in left upper chest area. States pain is constant and gets sharper with ceetain movements. The problem has been gradually improving. The quality of the pain is described as burning. The pain does not radiate. Associated symptoms include a cough. Pertinent negatives include no dizziness, fever, headaches, irregular heartbeat, lower extremity edema, palpitations, shortness of breath or vomiting.  Associated symptoms comments: Thinks it might be stress related. . Associated with: coughing  Treatments tried: Antibiotics, Prednisone. Prior diagnostic workup includes echocardiogram.         Past Medical History:     Past Medical History:   Diagnosis Date    Depression     Headache(784.0)     Hypotension     Vasodepressor syncope       Reviewed all health maintenance requirements and ordered appropriate tests  Health Maintenance Due   Topic Date Due    HIV screen  04/10/1988    DTaP/Tdap/Td vaccine (1 - Tdap) 04/10/1992    Flu vaccine (1) 09/01/2017       Past Surgical History:     Past Surgical History:   Procedure Laterality Date    HYSTERECTOMY      TUBAL LIGATION          Medications:       Prior to Admission medications    Medication Sig Start Date End Date Taking?  Authorizing Provider   azithromycin (ZITHROMAX) 250 MG tablet Take 2 tablets (500 mg) on Day 1, followed by 1 tablet (250 mg) once daily on Days 2 through 5. 12/10/17 12/20/17 Yes Sada Smith PA-C   etodolac (LODINE) 300 MG capsule Take 1 capsule by mouth every 8 hours 12/10/17  Yes Sada Smith PA-C   methocarbamol (ROBAXIN) 750 MG tablet Take 1 tablet by mouth 4 times daily for 5 days 12/10/17 12/15/17 Yes Sada Smith PA-C   buPROPion [facility] SR) 150 MG extended release tablet Take 1 tablet by mouth 2 times daily Start with one tab daily for 3 days then increase to one tab twice daily 9/20/17  Yes Gregory Marina NP   albuterol sulfate HFA (PROAIR HFA) 108 (90 Base) MCG/ACT inhaler Inhale 2 puffs into the lungs every 6 hours as needed for Wheezing 9/20/17  Yes Gregory Marina NP   estradiol (ESTRACE) 1 MG tablet Take 1 tablet by mouth daily 3/31/16  Yes Mercedez Vázquez MD   FLUVIRIN PRESERVATIVE FREE SUSP  11/1/14  Yes Historical Provider, MD   predniSONE (DELTASONE) 10 MG tablet  11/24/17   Historical Provider, MD   omeprazole (PRILOSEC) 20 MG delayed release capsule take 1 capsule by mouth once daily 9/20/17   Historical Provider, MD toxic. Sitting upright on exam table without distress. Respirations are regular, non labored and quiet. HENT:   Head: Normocephalic and atraumatic. Nose: Nose normal.   Mouth/Throat: Uvula is midline, oropharynx is clear and moist and mucous membranes are normal.   Eyes: Conjunctivae and EOM are normal. Pupils are equal, round, and reactive to light. Neck: Normal range of motion. Neck supple. No tracheal deviation present. No thyromegaly present. Cardiovascular: Normal rate, regular rhythm, normal heart sounds and intact distal pulses. Exam reveals no gallop and no friction rub. No murmur heard. Pulses:       Radial pulses are 2+ on the left side. Pulmonary/Chest: Effort normal and breath sounds normal. No accessory muscle usage. No tachypnea. No respiratory distress. She has no decreased breath sounds. She has no wheezes. She has no rhonchi. She has no rales. No cough, no wheeze, no distress  Breath sounds are clear to auscultation over posterior bilateral fields. Chest expansion is symmetrical with non-restricted air movement. Musculoskeletal: Normal range of motion. She exhibits no edema or tenderness. Lymphadenopathy:     She has no cervical adenopathy. Neurological: She is alert and oriented to person, place, and time. No cranial nerve deficit. She exhibits normal muscle tone. Coordination and gait normal.   Skin: Skin is warm and dry. No rash noted. No erythema. Psychiatric: She has a normal mood and affect. Her speech is normal and behavior is normal. Judgment and thought content normal.   Nursing note and vitals reviewed. Data:     1. Influenza vaccine needed  INFLUENZA, QUADV, 3 YRS AND OLDER, IM, PF, PREFILL SYR OR SDV, 0.5ML (FLUZONE QUADV, PF)   2. Bronchitis      ED F/U 12/10/17   3.  Pleurisy      ED F/U 12/10/17       Lab Results   Component Value Date     12/10/2017    K 4.3 12/10/2017     12/10/2017    CO2 27 12/10/2017    BUN 11 12/10/2017 CREATININE 0.83 12/10/2017    GLUCOSE 103 12/10/2017    PROT 7.3 09/19/2017    LABALBU 4.2 09/19/2017    BILITOT 0.54 09/19/2017    ALKPHOS 101 09/19/2017    AST 17 09/19/2017    ALT 14 09/19/2017     Lab Results   Component Value Date    WBC 9.6 12/10/2017    RBC 4.15 12/10/2017    HGB 12.9 12/10/2017    HCT 39.1 12/10/2017    MCV 94.2 12/10/2017    MCH 31.2 12/10/2017    MCHC 33.1 12/10/2017    RDW 13.3 12/10/2017     12/10/2017    MPV 8.1 12/10/2017     Lab Results   Component Value Date    TSH 1.40 09/19/2017     Lab Results   Component Value Date    CHOL 225 09/19/2017    HDL 42 09/19/2017       Assessment/Plan:   Caregiver/patient informed today if any severe or worsening of symptoms, spikes in fever, difficulty swallowing, respiratory distress to go to ED. Caregiver/patient verbalizes understanding. And tingling antibiotics and steroids as instructed per emergency department. Smoking cessation encouraged. Narrative   FINAL REPORT       EXAM:  XR CHEST PORTABLE       HISTORY:  cp        TECHNIQUE:  upright single view chest       PRIORS:  Comparison is November 24, 2017       FINDINGS:     Cardiac and mediastinal contours are unremarkable.  No focal pulmonary infiltrate is identified.  No pleural fluid collection seen.  Pulmonary vasculature is unremarkable.            Impression   Impression:     Negative single-view chest            Electronically Signed By: Romana Jiménez   on  12/10/2017  16:50         12/10/2017 10:56 PM - Cristhian, [facility] Incoming Ekg Results From [facility]     Component Results     Component Value Ref Range & Units Status Collected Lab   Ventricular Rate 61  BPM Final 12/10/2017  8:10    Atrial Rate 61  BPM Final 12/10/2017  8:10    P-R Interval 156  ms Final 12/10/2017  8:10    QRS Duration 84  ms Final 12/10/2017  8:10    Q-T Interval 440  ms Final 12/10/2017  8:10    QTc Calculation (Bazett) 442  ms Final 12/10/2017  8:10    P [place] 48

## 2018-08-01 ENCOUNTER — HOSPITAL ENCOUNTER (EMERGENCY)
Age: 43
Discharge: HOME | End: 2018-08-01
Payer: MEDICARE

## 2018-08-01 VITALS
TEMPERATURE: 98.2 F | HEART RATE: 51 BPM | OXYGEN SATURATION: 99 % | RESPIRATION RATE: 18 BRPM | SYSTOLIC BLOOD PRESSURE: 96 MMHG | DIASTOLIC BLOOD PRESSURE: 62 MMHG

## 2018-08-01 DIAGNOSIS — M79.10 MYALGIA: Primary | ICD-10-CM

## 2018-08-01 PROCEDURE — 99283 EMERGENCY DEPT VISIT LOW MDM: CPT

## 2018-08-01 PROCEDURE — 6370000000 HC RX 637 (ALT 250 FOR IP): Performed by: PHYSICIAN ASSISTANT

## 2018-08-01 PROCEDURE — 6360000002 HC RX W HCPCS: Performed by: PHYSICIAN ASSISTANT

## 2018-08-01 PROCEDURE — 96372 THER/PROPH/DIAG INJ SC/IM: CPT

## 2018-08-01 RX ORDER — CYCLOBENZAPRINE HCL 10 MG
10 TABLET ORAL 3 TIMES DAILY PRN
Qty: 12 TABLET | Refills: 0 | Status: SHIPPED | OUTPATIENT
Start: 2018-08-01 | End: 2018-08-11

## 2018-08-01 RX ORDER — HYDROCODONE BITARTRATE AND ACETAMINOPHEN 5; 325 MG/1; MG/1
1 TABLET ORAL ONCE
Status: COMPLETED | OUTPATIENT
Start: 2018-08-01 | End: 2018-08-01

## 2018-08-01 RX ORDER — ORPHENADRINE CITRATE 30 MG/ML
60 INJECTION INTRAMUSCULAR; INTRAVENOUS ONCE
Status: COMPLETED | OUTPATIENT
Start: 2018-08-01 | End: 2018-08-01

## 2018-08-01 RX ADMIN — ORPHENADRINE CITRATE 60 MG: 30 INJECTION INTRAMUSCULAR; INTRAVENOUS at 14:52

## 2018-08-01 RX ADMIN — HYDROCODONE BITARTRATE AND ACETAMINOPHEN 1 TABLET: 5; 325 TABLET ORAL at 14:52

## 2018-08-01 ASSESSMENT — ENCOUNTER SYMPTOMS
WHEEZING: 0
RHINORRHEA: 0
SHORTNESS OF BREATH: 0
COUGH: 0
VOMITING: 0
EYE DISCHARGE: 0
CONSTIPATION: 0
NAUSEA: 0
BLOOD IN STOOL: 0
ABDOMINAL PAIN: 0
EYE REDNESS: 0
BACK PAIN: 1
CHEST TIGHTNESS: 0
SORE THROAT: 0
DIARRHEA: 0

## 2018-08-01 ASSESSMENT — PAIN SCALES - GENERAL
PAINLEVEL_OUTOF10: 2
PAINLEVEL_OUTOF10: 5

## 2018-08-01 ASSESSMENT — PAIN DESCRIPTION - PAIN TYPE: TYPE: ACUTE PAIN

## 2018-08-01 ASSESSMENT — PAIN DESCRIPTION - LOCATION: LOCATION: BACK

## 2018-08-01 NOTE — ED PROVIDER NOTES
Musculoskeletal: Positive for back pain. Negative for arthralgias and myalgias. Skin: Negative for pallor and rash. Allergic/Immunologic: Negative for food allergies and immunocompromised state. Neurological: Negative for dizziness, syncope, weakness and light-headedness. Hematological: Negative for adenopathy. Does not bruise/bleed easily. Psychiatric/Behavioral: Negative for behavioral problems and suicidal ideas. The patient is not nervous/anxious. Except as noted above the remainder of the review of systems was reviewed and negative.        PAST MEDICAL HISTORY     Past Medical History:   Diagnosis Date    Depression     Headache(784.0)     Hypotension     Vasodepressor syncope          SURGICAL HISTORY       Past Surgical History:   Procedure Laterality Date    HYSTERECTOMY      TUBAL LIGATION           CURRENT MEDICATIONS       Discharge Medication List as of 8/1/2018  3:38 PM      CONTINUE these medications which have NOT CHANGED    Details   albuterol sulfate HFA (PROAIR HFA) 108 (90 Base) MCG/ACT inhaler Inhale 2 puffs into the lungs every 6 hours as needed for Wheezing, Disp-1 Inhaler, R-3Normal             ALLERGIES     Codeine    FAMILY HISTORY       Family History   Problem Relation Age of Onset    Diabetes Mother     Thyroid Disease Mother     Arthritis Mother     Cancer Father     Stroke Father     Asthma Father     Emphysema Father     Seizures Sister           SOCIAL HISTORY       Social History     Social History    Marital status:      Spouse name: N/A    Number of children: N/A    Years of education: N/A     Social History Main Topics    Smoking status: Current Every Day Smoker     Packs/day: 0.50     Years: 11.00     Types: Cigarettes    Smokeless tobacco: Never Used    Alcohol use No    Drug use: No    Sexual activity: Not Asked     Other Topics Concern    None     Social History Narrative    None       SCREENINGS    Zoar Coma Scale  Eye Opening: Spontaneous  Best Verbal Response: Oriented  Best Motor Response: Obeys commands  Bakari Coma Scale Score: 15        PHYSICAL EXAM    (up to 7 for level 4, 8 or more for level 5)     ED Triage Vitals   BP Temp Temp Source Pulse Resp SpO2 Height Weight   08/01/18 1343 08/01/18 1341 08/01/18 1341 08/01/18 1341 08/01/18 1341 08/01/18 1341 -- --   104/81 98.2 °F (36.8 °C) Tympanic 64 20 97 %         Physical Exam   Constitutional: She is oriented to person, place, and time. She appears well-developed and well-nourished. No distress. HENT:   Head: Normocephalic and atraumatic. Right Ear: External ear normal.   Left Ear: External ear normal.   Mouth/Throat: Oropharynx is clear and moist. No oropharyngeal exudate. Eyes: Conjunctivae and EOM are normal. Pupils are equal, round, and reactive to light. Right eye exhibits no discharge. Left eye exhibits no discharge. No scleral icterus. Neck: Normal range of motion. Neck supple. No tracheal deviation present. Cardiovascular: Normal rate, regular rhythm and intact distal pulses. Exam reveals no gallop and no friction rub. No murmur heard. Pulmonary/Chest: Effort normal and breath sounds normal. No stridor. No respiratory distress. She has no wheezes. She has no rales. She exhibits no tenderness. Abdominal: Soft. Bowel sounds are normal. She exhibits no distension. There is no tenderness. There is no rebound and no guarding. Musculoskeletal: Normal range of motion. She exhibits tenderness. She exhibits no edema or deformity. There is muscular and paraspinous muscular discomfort around the lower T-spine and the upper L-spine. Most nuclear discomfort around the latissimus dorsi on the left. There is no CVA tenderness. There is no abrasions no open wounds. There is no midline bony spinal tenderness. There is no bony step-off. Patient is up and auditory she is able to move all 4 extremities.   Intact distal pulses sensation Refill less than 3 seconds. Neurological: She is alert and oriented to person, place, and time. She has normal reflexes. No cranial nerve deficit. Coordination normal.   Skin: Skin is warm and dry. No rash noted. She is not diaphoretic. No erythema. Psychiatric: She has a normal mood and affect. Her behavior is normal.   Nursing note and vitals reviewed. DIAGNOSTIC RESULTS       EMERGENCY DEPARTMENT COURSE and DIFFERENTIAL DIAGNOSIS/MDM:   Vitals:    Vitals:    08/01/18 1341 08/01/18 1343 08/01/18 1531   BP:  104/81 96/62   Pulse: 64  51   Resp: 20  18   Temp: 98.2 °F (36.8 °C)  98.2 °F (36.8 °C)   TempSrc: Tympanic     SpO2: 97%  99%         MDM  47-year-old female who presents with back discomfort after her son stepped on her back. On exam has some paraspinous discomfort there is no midline bony spinal tenderness. She has intact pulses sensation capillary refill less than 3 seconds she is up and ambulatory. No paresthesias. At this point I think this is likely muscular. And I'm going to treat patient symptomatically. Patient is resting comfortably at this time and in no distress. We discussed at length the patient's diagnosis. Patient understands to follow up with primary care provider in the next 2 days. Patient verbalized understanding of this. We went over medications. Strict return warnings were given. Patient will return to the emergency room as needed with new or worsening complaints. Patient ambulated out of the ER with no distress. Procedures    FINAL IMPRESSION      1.  Myalgia          DISPOSITION/PLAN   DISPOSITION Decision To Discharge 08/01/2018 03:37:39 PM      PATIENT REFERRED TO:  [facility] ED  [address]  [phone]    If symptoms worsen, As needed    Eliezer Gaston, APRN - CNP  [address]  [phone]    Schedule an appointment as soon as possible for a visit in 2 days        DISCHARGE MEDICATIONS:  Discharge Medication List as of 8/1/2018  3:38 PM      START taking these medications    Details   cyclobenzaprine (FLEXERIL) 10 MG tablet Take 1 tablet by mouth 3 times daily as needed for Muscle spasms, Disp-12 tablet, R-0Print                    Summation      Patient Course:      ED Medications administered this visit:    Medications   orphenadrine (NORFLEX) injection 60 mg (60 mg Intramuscular Given 8/1/18 1452)   HYDROcodone-acetaminophen (NORCO) 5-325 MG per tablet 1 tablet (1 tablet Oral Given 8/1/18 1452)       New Prescriptions from this visit:    Discharge Medication List as of 8/1/2018  3:38 PM      START taking these medications    Details   cyclobenzaprine (FLEXERIL) 10 MG tablet Take 1 tablet by mouth 3 times daily as needed for Muscle spasms, Disp-12 tablet, R-0Print             Follow-up:  [facility] ED  [address]  [phone]    If symptoms worsen, As needed    Jennifer Chester, APRN - CNP  [address]  [phone]    Schedule an appointment as soon as possible for a visit in 2 days          Final Impression:   1.  Myalgia               (Please note that portions of this note were completed with a voice recognition program.  Efforts were made to edit the dictations but occasionally words are mis-transcribed.)            Frandy Fernando PA-C  08/01/18 [id number]

## 2018-08-02 ENCOUNTER — CARE COORDINATION (OUTPATIENT)
Dept: CARE COORDINATION | Age: 43
End: 2018-08-02

## 2018-09-28 ENCOUNTER — TELEPHONE (OUTPATIENT)
Dept: PRIMARY CARE CLINIC | Age: 43
End: 2018-09-28

## 2018-09-28 NOTE — TELEPHONE ENCOUNTER
Writer left message reminding pt of lipid panel due and to make sure she is fasting for it & to call the office with any questions.

## 2019-12-12 ENCOUNTER — OFFICE VISIT (OUTPATIENT)
Dept: PRIMARY CARE CLINIC | Age: 44
End: 2019-12-12
Payer: MEDICARE

## 2019-12-12 VITALS
HEIGHT: 65 IN | SYSTOLIC BLOOD PRESSURE: 92 MMHG | DIASTOLIC BLOOD PRESSURE: 59 MMHG | WEIGHT: 153.9 LBS | BODY MASS INDEX: 25.64 KG/M2 | TEMPERATURE: 98.4 F | HEART RATE: 80 BPM | RESPIRATION RATE: 14 BRPM

## 2019-12-12 DIAGNOSIS — J45.20 MILD INTERMITTENT ASTHMA WITHOUT COMPLICATION: Primary | ICD-10-CM

## 2019-12-12 DIAGNOSIS — E78.5 DYSLIPIDEMIA: ICD-10-CM

## 2019-12-12 DIAGNOSIS — F34.1 DYSTHYMIA: ICD-10-CM

## 2019-12-12 PROCEDURE — 99214 OFFICE O/P EST MOD 30 MIN: CPT | Performed by: NURSE PRACTITIONER

## 2019-12-12 PROCEDURE — G8427 DOCREV CUR MEDS BY ELIG CLIN: HCPCS | Performed by: NURSE PRACTITIONER

## 2019-12-12 PROCEDURE — G8484 FLU IMMUNIZE NO ADMIN: HCPCS | Performed by: NURSE PRACTITIONER

## 2019-12-12 PROCEDURE — 4004F PT TOBACCO SCREEN RCVD TLK: CPT | Performed by: NURSE PRACTITIONER

## 2019-12-12 PROCEDURE — G8419 CALC BMI OUT NRM PARAM NOF/U: HCPCS | Performed by: NURSE PRACTITIONER

## 2019-12-12 RX ORDER — CLONAZEPAM 0.5 MG/1
1 TABLET ORAL 2 TIMES DAILY PRN
Qty: 60 TABLET | Refills: 0 | Status: SHIPPED | OUTPATIENT
Start: 2019-12-12 | End: 2020-02-11 | Stop reason: SDUPTHER

## 2019-12-12 RX ORDER — VILAZODONE HYDROCHLORIDE 20 MG/1
20 TABLET ORAL DAILY
Qty: 90 TABLET | Refills: 0 | Status: SHIPPED | OUTPATIENT
Start: 2019-12-12 | End: 2019-12-18

## 2019-12-12 RX ORDER — ALBUTEROL SULFATE 90 UG/1
2 AEROSOL, METERED RESPIRATORY (INHALATION) EVERY 6 HOURS PRN
Qty: 1 INHALER | Refills: 3 | Status: SHIPPED | OUTPATIENT
Start: 2019-12-12 | End: 2020-12-01 | Stop reason: SDUPTHER

## 2019-12-12 SDOH — ECONOMIC STABILITY: TRANSPORTATION INSECURITY
IN THE PAST 12 MONTHS, HAS THE LACK OF TRANSPORTATION KEPT YOU FROM MEDICAL APPOINTMENTS OR FROM GETTING MEDICATIONS?: NO

## 2019-12-12 SDOH — ECONOMIC STABILITY: TRANSPORTATION INSECURITY
IN THE PAST 12 MONTHS, HAS LACK OF TRANSPORTATION KEPT YOU FROM MEETINGS, WORK, OR FROM GETTING THINGS NEEDED FOR DAILY LIVING?: NO

## 2019-12-12 SDOH — ECONOMIC STABILITY: FOOD INSECURITY: WITHIN THE PAST 12 MONTHS, YOU WORRIED THAT YOUR FOOD WOULD RUN OUT BEFORE YOU GOT MONEY TO BUY MORE.: NEVER TRUE

## 2019-12-12 SDOH — ECONOMIC STABILITY: FOOD INSECURITY: WITHIN THE PAST 12 MONTHS, THE FOOD YOU BOUGHT JUST DIDN'T LAST AND YOU DIDN'T HAVE MONEY TO GET MORE.: NEVER TRUE

## 2019-12-12 SDOH — ECONOMIC STABILITY: INCOME INSECURITY: HOW HARD IS IT FOR YOU TO PAY FOR THE VERY BASICS LIKE FOOD, HOUSING, MEDICAL CARE, AND HEATING?: SOMEWHAT HARD

## 2019-12-12 ASSESSMENT — COPD QUESTIONNAIRES: COPD: 1

## 2019-12-12 ASSESSMENT — ENCOUNTER SYMPTOMS
HOARSE VOICE: 0
SPUTUM PRODUCTION: 0
ABDOMINAL PAIN: 0
VISUAL CHANGE: 0
RHINORRHEA: 0
DIFFICULTY BREATHING: 0
HEMOPTYSIS: 0
SORE THROAT: 0
NAUSEA: 0
FREQUENT THROAT CLEARING: 0
VOMITING: 0
CHEST TIGHTNESS: 0
WHEEZING: 0
DIARRHEA: 0
COUGH: 1
SHORTNESS OF BREATH: 0
CONSTIPATION: 0

## 2019-12-18 ENCOUNTER — TELEPHONE (OUTPATIENT)
Dept: PRIMARY CARE CLINIC | Age: 44
End: 2019-12-18

## 2019-12-18 DIAGNOSIS — F34.1 DYSTHYMIA: Primary | ICD-10-CM

## 2019-12-18 RX ORDER — DULOXETIN HYDROCHLORIDE 30 MG/1
30 CAPSULE, DELAYED RELEASE ORAL DAILY
Qty: 90 CAPSULE | Refills: 0 | Status: SHIPPED | OUTPATIENT
Start: 2019-12-18 | End: 2020-03-16 | Stop reason: SDUPTHER

## 2019-12-20 ENCOUNTER — TELEPHONE (OUTPATIENT)
Dept: PRIMARY CARE CLINIC | Age: 44
End: 2019-12-20

## 2019-12-31 ENCOUNTER — HOSPITAL ENCOUNTER (OUTPATIENT)
Age: 44
Discharge: HOME | End: 2019-12-31
Payer: MEDICARE

## 2019-12-31 LAB
ABSOLUTE EOS #: 0.33 K/UL (ref 0–0.44)
ABSOLUTE IMMATURE GRANULOCYTE: <0.03 K/UL (ref 0–0.3)
ABSOLUTE LYMPH #: 1.66 K/UL (ref 1.1–3.7)
ABSOLUTE MONO #: 0.51 K/UL (ref 0.1–1.2)
ALT SERPL-CCNC: 9 U/L (ref 5–33)
ANION GAP SERPL CALCULATED.3IONS-SCNC: 11 MMOL/L (ref 9–17)
AST SERPL-CCNC: 17 U/L
BASOPHILS # BLD: 1 % (ref 0–2)
BASOPHILS ABSOLUTE: 0.07 K/UL (ref 0–0.2)
BUN BLDV-MCNC: 8 MG/DL (ref 6–20)
BUN/CREAT BLD: 9 (ref 9–20)
CALCIUM SERPL-MCNC: 9.6 MG/DL (ref 8.6–10.4)
CHLORIDE BLD-SCNC: 107 MMOL/L (ref 98–107)
CHOLESTEROL/HDL RATIO: 6
CHOLESTEROL: 222 MG/DL
CO2: 25 MMOL/L (ref 20–31)
CREAT SERPL-MCNC: 0.93 MG/DL (ref 0.5–0.9)
DIFFERENTIAL TYPE: ABNORMAL
EOSINOPHILS RELATIVE PERCENT: 5 % (ref 1–4)
GFR AFRICAN AMERICAN: >60 ML/MIN
GFR NON-AFRICAN AMERICAN: >60 ML/MIN
GFR SERPL CREATININE-BSD FRML MDRD: ABNORMAL ML/MIN/{1.73_M2}
GFR SERPL CREATININE-BSD FRML MDRD: ABNORMAL ML/MIN/{1.73_M2}
GLUCOSE BLD-MCNC: 90 MG/DL (ref 70–99)
HCT VFR BLD CALC: 40.4 % (ref 36.3–47.1)
HDLC SERPL-MCNC: 37 MG/DL
HEMOGLOBIN: 12.9 G/DL (ref 11.9–15.1)
IMMATURE GRANULOCYTES: 0 %
LDL CHOLESTEROL: 153 MG/DL (ref 0–130)
LYMPHOCYTES # BLD: 25 % (ref 24–43)
MCH RBC QN AUTO: 31.6 PG (ref 25.2–33.5)
MCHC RBC AUTO-ENTMCNC: 31.9 G/DL (ref 28.4–34.8)
MCV RBC AUTO: 99 FL (ref 82.6–102.9)
MONOCYTES # BLD: 8 % (ref 3–12)
NRBC AUTOMATED: 0 PER 100 WBC
PDW BLD-RTO: 12.4 % (ref 11.8–14.4)
PLATELET # BLD: 269 K/UL (ref 138–453)
PLATELET ESTIMATE: ABNORMAL
PMV BLD AUTO: 9.8 FL (ref 8.1–13.5)
POTASSIUM SERPL-SCNC: 4.2 MMOL/L (ref 3.7–5.3)
RBC # BLD: 4.08 M/UL (ref 3.95–5.11)
RBC # BLD: ABNORMAL 10*6/UL
SEG NEUTROPHILS: 61 % (ref 36–65)
SEGMENTED NEUTROPHILS ABSOLUTE COUNT: 4.02 K/UL (ref 1.5–8.1)
SODIUM BLD-SCNC: 143 MMOL/L (ref 135–144)
TRIGL SERPL-MCNC: 160 MG/DL
VLDLC SERPL CALC-MCNC: ABNORMAL MG/DL (ref 1–30)
WBC # BLD: 6.6 K/UL (ref 3.5–11.3)
WBC # BLD: ABNORMAL 10*3/UL

## 2019-12-31 PROCEDURE — 84460 ALANINE AMINO (ALT) (SGPT): CPT

## 2019-12-31 PROCEDURE — 85025 COMPLETE CBC W/AUTO DIFF WBC: CPT

## 2019-12-31 PROCEDURE — 80061 LIPID PANEL: CPT

## 2019-12-31 PROCEDURE — 80048 BASIC METABOLIC PNL TOTAL CA: CPT

## 2019-12-31 PROCEDURE — 84450 TRANSFERASE (AST) (SGOT): CPT

## 2019-12-31 PROCEDURE — 36415 COLL VENOUS BLD VENIPUNCTURE: CPT

## 2020-01-08 ENCOUNTER — OFFICE VISIT (OUTPATIENT)
Dept: PRIMARY CARE CLINIC | Age: 45
End: 2020-01-08
Payer: MEDICARE

## 2020-01-08 VITALS
HEART RATE: 85 BPM | DIASTOLIC BLOOD PRESSURE: 58 MMHG | RESPIRATION RATE: 16 BRPM | HEIGHT: 65 IN | BODY MASS INDEX: 25.44 KG/M2 | WEIGHT: 152.7 LBS | TEMPERATURE: 97.4 F | SYSTOLIC BLOOD PRESSURE: 79 MMHG

## 2020-01-08 PROCEDURE — 4004F PT TOBACCO SCREEN RCVD TLK: CPT | Performed by: NURSE PRACTITIONER

## 2020-01-08 PROCEDURE — G8427 DOCREV CUR MEDS BY ELIG CLIN: HCPCS | Performed by: NURSE PRACTITIONER

## 2020-01-08 PROCEDURE — G8484 FLU IMMUNIZE NO ADMIN: HCPCS | Performed by: NURSE PRACTITIONER

## 2020-01-08 PROCEDURE — 99214 OFFICE O/P EST MOD 30 MIN: CPT | Performed by: NURSE PRACTITIONER

## 2020-01-08 PROCEDURE — G8419 CALC BMI OUT NRM PARAM NOF/U: HCPCS | Performed by: NURSE PRACTITIONER

## 2020-01-08 RX ORDER — ATORVASTATIN CALCIUM 20 MG/1
20 TABLET, FILM COATED ORAL DAILY
Qty: 90 TABLET | Refills: 1 | Status: SHIPPED | OUTPATIENT
Start: 2020-01-08 | End: 2020-09-23

## 2020-01-08 RX ORDER — CLONAZEPAM 0.5 MG/1
1 TABLET ORAL 2 TIMES DAILY PRN
Qty: 60 TABLET | Refills: 0 | Status: CANCELLED | OUTPATIENT
Start: 2020-01-08 | End: 2020-02-07

## 2020-01-08 ASSESSMENT — ENCOUNTER SYMPTOMS
DIARRHEA: 0
VISUAL CHANGE: 0
BELCHING: 0
HEARTBURN: 1
STRIDOR: 0
GLOBUS SENSATION: 0
WATER BRASH: 0
SHORTNESS OF BREATH: 0
CONSTIPATION: 0
CHEST TIGHTNESS: 0
VOMITING: 0
WHEEZING: 0
HOARSE VOICE: 0
RHINORRHEA: 0
SORE THROAT: 0
ABDOMINAL PAIN: 0
NAUSEA: 0
COUGH: 0

## 2020-01-08 ASSESSMENT — PATIENT HEALTH QUESTIONNAIRE - PHQ9
SUM OF ALL RESPONSES TO PHQ9 QUESTIONS 1 & 2: 2
1. LITTLE INTEREST OR PLEASURE IN DOING THINGS: 0
SUM OF ALL RESPONSES TO PHQ QUESTIONS 1-9: 2
SUM OF ALL RESPONSES TO PHQ QUESTIONS 1-9: 2
2. FEELING DOWN, DEPRESSED OR HOPELESS: 2

## 2020-01-08 NOTE — PROGRESS NOTES
include lack of exercise, caffeine use and smoking/tobacco exposure. She has tried a PPI for the symptoms. The treatment provided significant relief. Past procedures do not include an EGD or a UGI. Mental Health Problem   The primary symptoms include dysphoric mood (IMPROVED). The primary symptoms do not include delusions, hallucinations, bizarre behavior, disorganized speech, negative symptoms or somatic symptoms. Primary symptoms comment: NERVOUSNESS. The current episode started more than 1 month ago. This is a chronic problem. The onset of the illness is precipitated by emotional stress. The degree of incapacity that she is experiencing as a consequence of her illness is moderate. Sequelae of the illness include harmed interpersonal relations. Additional symptoms of the illness include agitation (Improved) and distractible. Additional symptoms of the illness do not include anhedonia, insomnia, hypersomnia, appetite change, unexpected weight change, fatigue, psychomotor retardation, feelings of worthlessness, attention impairment, euphoric mood, increased goal-directed activity, flight of ideas, inflated self-esteem, decreased need for sleep, poor judgment, visual change, headaches, abdominal pain or seizures. She does not admit to suicidal ideas. She does not have a plan to commit suicide. She does not contemplate harming herself. She has not already injured self. She does not contemplate injuring another person. She has not already  injured another person. Risk factors that are present for mental illness include a history of mental illness and a family history of mental illness. Asthma   There is no chest tightness, cough, hoarse voice, shortness of breath or wheezing. This is a chronic problem. The current episode started more than 1 year ago. The problem occurs intermittently. The problem has been unchanged. Associated symptoms include dyspnea on exertion and heartburn.  Pertinent negatives include no appetite change, chest pain, fever, headaches, myalgias, rhinorrhea, sore throat or weight loss. Her symptoms are aggravated by strenuous activity, URI, exposure to smoke, climbing stairs and change in weather. Her symptoms are alleviated by beta-agonist and rest. She reports complete improvement on treatment. Her symptoms are not alleviated by rest. Risk factors for lung disease include smoking/tobacco exposure. Her past medical history is significant for asthma, bronchitis and COPD. Past Medical History:     Past Medical History:   Diagnosis Date    Depression     Headache(784.0)     Hypotension     Vasodepressor syncope       Reviewed all health maintenance requirements and ordered appropriate tests  There are no preventive care reminders to display for this patient. Past Surgical History:     Past Surgical History:   Procedure Laterality Date    HYSTERECTOMY      TUBAL LIGATION          Medications:       Prior to Admission medications    Medication Sig Start Date End Date Taking? Authorizing Provider   atorvastatin (LIPITOR) 20 MG tablet Take 1 tablet by mouth daily 1/8/20  Yes Pamula Ramos Might, APRN - CNP   DULoxetine (CYMBALTA) 30 MG extended release capsule Take 1 capsule by mouth daily 12/18/19  Yes Pamula Ramos Might, APRN - CNP   albuterol sulfate HFA (VENTOLIN HFA) 108 (90 Base) MCG/ACT inhaler Inhale 2 puffs into the lungs every 6 hours as needed for Wheezing 12/12/19  Yes Pamula Ramos Might, APRN - CNP   clonazePAM (KLONOPIN) 0.5 MG tablet Take 2 tablets by mouth 2 times daily as needed for Anxiety for up to 30 days. 12/12/19 1/11/20 Yes Pamula Ramos Might, APRN - CNP        Allergies:       Codeine    Social History:     Tobacco:    reports that she has been smoking cigarettes. She has a 5.50 pack-year smoking history. She has never used smokeless tobacco.  Alcohol:      reports no history of alcohol use. Drug Use:  reports no history of drug use.     Family History:     Family History   Problem Relation Age of Onset    Diabetes Mother     Thyroid Disease Mother     Arthritis Mother     Cancer Father     Stroke Father     Asthma Father     Emphysema Father     Seizures Sister        Review of Systems:     Positive and Negative as described in HPI    Review of Systems   Constitutional: Negative for appetite change, chills, fatigue, fever, unexpected weight change and weight loss. HENT: Negative for congestion, hoarse voice, rhinorrhea and sore throat. Eyes: Negative for visual disturbance. Respiratory: Negative for cough, shortness of breath and wheezing. Cardiovascular: Positive for dyspnea on exertion. Negative for chest pain and palpitations. Gastrointestinal: Positive for heartburn. Negative for abdominal pain, constipation, diarrhea, melena, nausea and vomiting. Genitourinary: Negative for difficulty urinating and dysuria. Musculoskeletal: Negative for gait problem and myalgias. Skin: Negative for rash. Neurological: Negative for dizziness, seizures, syncope and headaches. Psychiatric/Behavioral: Positive for agitation (Improved) and dysphoric mood (IMPROVED). Negative for behavioral problems, confusion, decreased concentration, hallucinations, self-injury, sleep disturbance and suicidal ideas. The patient is nervous/anxious (Improving). The patient does not have insomnia and is not hyperactive. Physical Exam:   Vitals:  BP (!) 79/58 (Site: Right Upper Arm, Position: Sitting, Cuff Size: Large Adult)   Pulse 85   Temp 97.4 °F (36.3 °C) (Temporal)   Resp 16   Ht 5' 5\" (1.651 m)   Wt 152 lb 11.2 oz (69.3 kg)   LMP 04/07/2008   BMI 25.41 kg/m²     Physical Exam  Vitals signs and nursing note reviewed. Constitutional:       General: She is not in acute distress. Appearance: Normal appearance. HENT:      Mouth/Throat:      Mouth: Mucous membranes are moist.   Eyes:      General: No scleral icterus.      Conjunctiva/sclera: Conjunctivae normal.   Neck:

## 2020-02-11 RX ORDER — CLONAZEPAM 1 MG/1
1 TABLET ORAL 2 TIMES DAILY PRN
Qty: 60 TABLET | Refills: 0 | Status: SHIPPED | OUTPATIENT
Start: 2020-02-11 | End: 2020-04-03 | Stop reason: SDUPTHER

## 2020-02-11 NOTE — TELEPHONE ENCOUNTER
Clonazepam 0.5mg 2/BID as needed to [facility] Maintenance   Topic Date Due    DTaP/Tdap/Td vaccine (1 - Tdap) 12/12/2020 (Originally 4/10/1984)    Flu vaccine (1) 12/12/2020 (Originally 9/1/2019)    HIV screen  12/12/2020 (Originally 4/10/1988)    Lipid screen  12/31/2020    Shingles Vaccine (1 of 2) 04/10/2023    Pneumococcal 0-64 years Vaccine  Completed    Hepatitis A vaccine  Aged Out    Hepatitis B vaccine  Aged Out    Hib vaccine  Aged Out    Meningococcal (ACWY) vaccine  Aged Out             (applicable per patient's age: Cancer Screenings, Depression Screening, Fall Risk Screening, Immunizations)    Microalb/Crt.  Ratio (mcg/mg creat)   Date Value   09/19/2017 16     LDL Cholesterol (mg/dL)   Date Value   12/31/2019 153 (H)     AST (U/L)   Date Value   12/31/2019 17     ALT (U/L)   Date Value   12/31/2019 9     BUN (mg/dL)   Date Value   12/31/2019 8      (goal A1C is < 7)   (goal LDL is <100) need 30-50% reduction from baseline     BP Readings from Last 3 Encounters:   01/08/20 (!) 79/58   12/12/19 (!) 92/59   08/01/18 96/62    (goal /80)      All Future Testing planned in [facility]:  Lab Frequency Next Occurrence   Lipid Panel Once 07/06/2020   AST Once 07/06/2020   ALT Once 07/06/2020       Next Visit Date:  Future Appointments   Date Time Provider Claudio Baeza   7/8/2020  1:40 PM Gabbi Sanchez, APRN - CNP [facility]            Patient Active Problem List:     Insomnia     Depression     Anxiety associated with depression     Tobacco abuse     Dyslipidemia     Dysphagia     Family history of throat cancer     Gastroesophageal reflux disease     H/O: hysterectomy     Mild intermittent asthma without complication

## 2020-02-23 ENCOUNTER — APPOINTMENT (OUTPATIENT)
Dept: GENERAL RADIOLOGY | Age: 45
End: 2020-02-23
Payer: MEDICARE

## 2020-02-23 ENCOUNTER — HOSPITAL ENCOUNTER (EMERGENCY)
Age: 45
Discharge: HOME | End: 2020-02-24
Attending: EMERGENCY MEDICINE
Payer: MEDICARE

## 2020-02-23 VITALS
DIASTOLIC BLOOD PRESSURE: 73 MMHG | SYSTOLIC BLOOD PRESSURE: 103 MMHG | HEART RATE: 72 BPM | TEMPERATURE: 99 F | OXYGEN SATURATION: 98 % | RESPIRATION RATE: 12 BRPM

## 2020-02-23 PROCEDURE — 99284 EMERGENCY DEPT VISIT MOD MDM: CPT

## 2020-02-23 PROCEDURE — 73562 X-RAY EXAM OF KNEE 3: CPT

## 2020-02-23 ASSESSMENT — PAIN DESCRIPTION - LOCATION: LOCATION: KNEE

## 2020-02-23 ASSESSMENT — PAIN DESCRIPTION - PAIN TYPE: TYPE: ACUTE PAIN

## 2020-02-23 ASSESSMENT — PAIN DESCRIPTION - ORIENTATION: ORIENTATION: LEFT

## 2020-02-23 ASSESSMENT — PAIN SCALES - GENERAL: PAINLEVEL_OUTOF10: 6

## 2020-02-24 ENCOUNTER — TELEPHONE (OUTPATIENT)
Dept: PRIMARY CARE CLINIC | Age: 45
End: 2020-02-24

## 2020-02-24 PROCEDURE — 6370000000 HC RX 637 (ALT 250 FOR IP): Performed by: EMERGENCY MEDICINE

## 2020-02-24 RX ORDER — ACETAMINOPHEN 500 MG
1000 TABLET ORAL ONCE
Status: COMPLETED | OUTPATIENT
Start: 2020-02-24 | End: 2020-02-24

## 2020-02-24 RX ORDER — IBUPROFEN 800 MG/1
800 TABLET ORAL 3 TIMES DAILY PRN
Qty: 30 TABLET | Refills: 0 | Status: SHIPPED | OUTPATIENT
Start: 2020-02-24 | End: 2020-07-21 | Stop reason: SDUPTHER

## 2020-02-24 RX ADMIN — ACETAMINOPHEN 1000 MG: 500 TABLET, FILM COATED ORAL at 00:44

## 2020-02-24 ASSESSMENT — PAIN SCALES - GENERAL: PAINLEVEL_OUTOF10: 6

## 2020-02-24 NOTE — ED PROVIDER NOTES
MEDICAL DECISION MAKING   Pertinent Imaging studies reviewed and interpreted. (See chart for details)     Differential diagnosis: includes but not limited to Arterial Injury/Ischemia, Fracture, Dislocation, Compartment Syndrome, Neurologic Deficit/Injury. MDM: Patient may have ligamentous injury to the knee. I told her to give it a week and see if is feeling better and if it is not then she should go and see orthopedics. FINAL IMPRESSION   1.  Acute pain of left knee        PLAN:   Outpatient treatment, follow-up, and discharge instructions (see EMR)    Electronically signed by: Kamille Mccann MD, 2/24/2020 12:47 AM          Kamille Mccann MD  02/24/20 1040

## 2020-03-16 ENCOUNTER — OFFICE VISIT (OUTPATIENT)
Dept: PRIMARY CARE CLINIC | Age: 45
End: 2020-03-16
Payer: MEDICARE

## 2020-03-16 VITALS
HEIGHT: 65 IN | WEIGHT: 155.5 LBS | DIASTOLIC BLOOD PRESSURE: 78 MMHG | HEART RATE: 78 BPM | BODY MASS INDEX: 25.91 KG/M2 | RESPIRATION RATE: 18 BRPM | TEMPERATURE: 97.8 F | SYSTOLIC BLOOD PRESSURE: 107 MMHG

## 2020-03-16 PROCEDURE — G8484 FLU IMMUNIZE NO ADMIN: HCPCS | Performed by: NURSE PRACTITIONER

## 2020-03-16 PROCEDURE — G8427 DOCREV CUR MEDS BY ELIG CLIN: HCPCS | Performed by: NURSE PRACTITIONER

## 2020-03-16 PROCEDURE — G8419 CALC BMI OUT NRM PARAM NOF/U: HCPCS | Performed by: NURSE PRACTITIONER

## 2020-03-16 PROCEDURE — 99214 OFFICE O/P EST MOD 30 MIN: CPT | Performed by: NURSE PRACTITIONER

## 2020-03-16 PROCEDURE — 4004F PT TOBACCO SCREEN RCVD TLK: CPT | Performed by: NURSE PRACTITIONER

## 2020-03-16 RX ORDER — DULOXETIN HYDROCHLORIDE 60 MG/1
60 CAPSULE, DELAYED RELEASE ORAL DAILY
Qty: 90 CAPSULE | Refills: 3 | Status: SHIPPED | OUTPATIENT
Start: 2020-03-16 | End: 2021-05-13 | Stop reason: SDUPTHER

## 2020-03-16 RX ORDER — ARIPIPRAZOLE 5 MG/1
5 TABLET ORAL DAILY
Qty: 90 TABLET | Refills: 0 | Status: SHIPPED | OUTPATIENT
Start: 2020-03-16 | End: 2020-07-22

## 2020-03-16 ASSESSMENT — ENCOUNTER SYMPTOMS
SORE THROAT: 0
SHORTNESS OF BREATH: 0
ABDOMINAL PAIN: 0
RHINORRHEA: 0
CONSTIPATION: 0
COUGH: 0
WHEEZING: 0
NAUSEA: 0
VOMITING: 0
VISUAL CHANGE: 0
DIARRHEA: 0

## 2020-03-16 NOTE — PROGRESS NOTES
Negative as described in HPI    Review of Systems   Constitutional: Positive for fatigue. Negative for appetite change, chills, fever and unexpected weight change. HENT: Negative for congestion, rhinorrhea and sore throat. Eyes: Negative for visual disturbance. Respiratory: Negative for cough, shortness of breath and wheezing. Cardiovascular: Negative for chest pain and palpitations. Gastrointestinal: Negative for abdominal pain, constipation, diarrhea, nausea and vomiting. Genitourinary: Negative for difficulty urinating and dysuria. Musculoskeletal: Negative for neck pain and neck stiffness. Skin: Negative for rash. Neurological: Negative for dizziness, seizures, syncope, light-headedness and headaches. Psychiatric/Behavioral: Positive for agitation, decreased concentration, dysphoric mood and sleep disturbance. Negative for behavioral problems, confusion, hallucinations, self-injury and suicidal ideas. The patient is nervous/anxious and has insomnia. The patient is not hyperactive. Physical Exam:   Vitals:  /78 (Site: Right Upper Arm, Position: Sitting, Cuff Size: Large Adult)   Pulse 78   Temp 97.8 °F (36.6 °C) (Temporal)   Resp 18   Ht 5' 5\" (1.651 m)   Wt 155 lb 8 oz (70.5 kg)   LMP 04/07/2008   BMI 25.88 kg/m²     Physical Exam  Vitals signs and nursing note reviewed. Constitutional:       General: She is not in acute distress. Appearance: She is well-developed. HENT:      Mouth/Throat:      Mouth: Mucous membranes are moist.   Eyes:      General: No scleral icterus. Conjunctiva/sclera: Conjunctivae normal.   Neck:      Musculoskeletal: Normal range of motion and neck supple. Cardiovascular:      Rate and Rhythm: Normal rate and regular rhythm. Heart sounds: No murmur. Pulmonary:      Effort: Pulmonary effort is normal.      Breath sounds: Normal breath sounds. No wheezing. Musculoskeletal: Normal range of motion.    Skin:     General: Skin is

## 2020-04-03 RX ORDER — CLONAZEPAM 1 MG/1
1 TABLET ORAL 2 TIMES DAILY PRN
Qty: 60 TABLET | Refills: 0 | Status: SHIPPED | OUTPATIENT
Start: 2020-04-03 | End: 2020-07-08 | Stop reason: SDUPTHER

## 2020-04-03 NOTE — TELEPHONE ENCOUNTER
Controlled Substance Monitoring:    Acute and Chronic Pain Monitoring:   RX Monitoring 4/3/2020   Periodic Controlled Substance Monitoring No signs of potential drug abuse or diversion identified.

## 2020-06-29 ENCOUNTER — TELEPHONE (OUTPATIENT)
Dept: PRIMARY CARE CLINIC | Age: 45
End: 2020-06-29

## 2020-07-08 ENCOUNTER — HOSPITAL ENCOUNTER (OUTPATIENT)
Age: 45
Discharge: HOME | End: 2020-07-08
Payer: MEDICARE

## 2020-07-08 ENCOUNTER — OFFICE VISIT (OUTPATIENT)
Dept: PRIMARY CARE CLINIC | Age: 45
End: 2020-07-08
Payer: MEDICARE

## 2020-07-08 VITALS
RESPIRATION RATE: 20 BRPM | SYSTOLIC BLOOD PRESSURE: 103 MMHG | HEART RATE: 71 BPM | BODY MASS INDEX: 26.89 KG/M2 | DIASTOLIC BLOOD PRESSURE: 72 MMHG | WEIGHT: 161.6 LBS | TEMPERATURE: 97.8 F

## 2020-07-08 LAB
ALT SERPL-CCNC: 12 U/L (ref 5–33)
AST SERPL-CCNC: 18 U/L
CHOLESTEROL/HDL RATIO: 5.2
CHOLESTEROL: 222 MG/DL
HDLC SERPL-MCNC: 43 MG/DL
LDL CHOLESTEROL: 151 MG/DL (ref 0–130)
TRIGL SERPL-MCNC: 141 MG/DL
VLDLC SERPL CALC-MCNC: ABNORMAL MG/DL (ref 1–30)

## 2020-07-08 PROCEDURE — 99214 OFFICE O/P EST MOD 30 MIN: CPT | Performed by: NURSE PRACTITIONER

## 2020-07-08 PROCEDURE — 36415 COLL VENOUS BLD VENIPUNCTURE: CPT

## 2020-07-08 PROCEDURE — 80061 LIPID PANEL: CPT

## 2020-07-08 PROCEDURE — G8427 DOCREV CUR MEDS BY ELIG CLIN: HCPCS | Performed by: NURSE PRACTITIONER

## 2020-07-08 PROCEDURE — G8419 CALC BMI OUT NRM PARAM NOF/U: HCPCS | Performed by: NURSE PRACTITIONER

## 2020-07-08 PROCEDURE — 84460 ALANINE AMINO (ALT) (SGPT): CPT

## 2020-07-08 PROCEDURE — 84450 TRANSFERASE (AST) (SGOT): CPT

## 2020-07-08 PROCEDURE — 4004F PT TOBACCO SCREEN RCVD TLK: CPT | Performed by: NURSE PRACTITIONER

## 2020-07-08 RX ORDER — CLONAZEPAM 1 MG/1
1 TABLET ORAL 2 TIMES DAILY PRN
Qty: 30 TABLET | Refills: 0 | Status: SHIPPED | OUTPATIENT
Start: 2020-07-08 | End: 2020-10-05 | Stop reason: SDUPTHER

## 2020-07-08 RX ORDER — DULOXETIN HYDROCHLORIDE 30 MG/1
30 CAPSULE, DELAYED RELEASE ORAL DAILY
Qty: 90 CAPSULE | Refills: 1 | Status: SHIPPED | OUTPATIENT
Start: 2020-07-08 | End: 2021-05-13 | Stop reason: SDUPTHER

## 2020-07-08 RX ORDER — CLONAZEPAM 1 MG/1
1 TABLET ORAL 2 TIMES DAILY PRN
Qty: 60 TABLET | Refills: 0 | Status: CANCELLED | OUTPATIENT
Start: 2020-07-08 | End: 2020-08-07

## 2020-07-08 ASSESSMENT — ENCOUNTER SYMPTOMS
RHINORRHEA: 0
NAUSEA: 0
SHORTNESS OF BREATH: 0
WHEEZING: 0
DIARRHEA: 0
COUGH: 0
CONSTIPATION: 0
ABDOMINAL PAIN: 0
VOMITING: 0
SORE THROAT: 0

## 2020-07-08 ASSESSMENT — PATIENT HEALTH QUESTIONNAIRE - PHQ9
1. LITTLE INTEREST OR PLEASURE IN DOING THINGS: 0
SUM OF ALL RESPONSES TO PHQ QUESTIONS 1-9: 0
SUM OF ALL RESPONSES TO PHQ QUESTIONS 1-9: 0
SUM OF ALL RESPONSES TO PHQ9 QUESTIONS 1 & 2: 0
2. FEELING DOWN, DEPRESSED OR HOPELESS: 0

## 2020-07-08 NOTE — PROGRESS NOTES
Name: Millie Paredes  : 1975         Chief Complaint:     Chief Complaint   Patient presents with    Hyperlipidemia     routine check    Mental Health Problem     not sleeping       History of Present Illness:      Millie Paredes is a 50 y.o.  female who presents with Hyperlipidemia (routine check) and Mental Health Problem (not sleeping)    Hyperlipidemia   This is a chronic problem. Pertinent negatives include no chest pain, focal sensory loss, focal weakness, myalgias or shortness of breath. Current antihyperlipidemic treatment includes statins. Risk factors for coronary artery disease include stress. She states that she is not following a low-carb diet and \"eats what's available\". Mental Health Problem   The primary symptoms include dysphoric mood and negative symptoms. The primary symptoms do not include delusions, hallucinations, bizarre behavior or disorganized speech. Additional symptoms of the illness include insomnia, agitation and flight of ideas. Additional symptoms of the illness do not include hypersomnia, appetite change, unexpected weight change, psychomotor retardation, visual change, headaches or abdominal pain. She does not contemplate harming herself. She denies thoughts of hurting herself or others. She reports that he is experiencing increased stress for various reasons. She states that she has not been getting out lately due to [place]. She reports not taking her klonopin in 2 months due to running out of pills. Back Pain:  Patient complains of lower bilateral back and hip pain that has been ongoing for one year. The pain is worsened when she bends or lifts. She notices the pain most frequently when she bends down to lift her grandson off of the floor. The pain is alleviated when she rests on her side, in a side-lying position. She also notes numbness that radiates down her legs bilaterally when she does similar actions, such as lifting or bending.        Past Medical History: Past Medical History:   Diagnosis Date    Depression     Headache(784.0)     Hypotension     Vasodepressor syncope       Reviewed all health maintenance requirements and ordered appropriate tests  There are no preventive care reminders to display for this patient. Past Surgical History:     Past Surgical History:   Procedure Laterality Date    HYSTERECTOMY      TUBAL LIGATION          Medications:       Prior to Admission medications    Medication Sig Start Date End Date Taking? Authorizing Provider   DULoxetine (CYMBALTA) 30 MG extended release capsule Take 1 capsule by mouth daily 7/8/20  Yes Jake Garcia Might, APRN - CNP   clonazePAM (KLONOPIN) 1 MG tablet Take 1 tablet by mouth 2 times daily as needed for Anxiety for up to 30 days. 7/8/20 8/7/20 Yes Jake Garcia Might, APRN - [facility]   DULoxetine (CYMBALTA) 60 MG extended release capsule Take 1 capsule by mouth daily 3/16/20  Yes Jake Garcia Might, APRN - [facility]   ARIPiprazole (ABILIFY) 5 MG tablet Take 1 tablet by mouth daily 3/16/20  Yes Jake Garcia Might, APRN - [facility]   ibuprofen (ADVIL;MOTRIN) 800 MG tablet Take 1 tablet by mouth 3 times daily as needed for Pain 2/24/20 7/8/20 Yes Jake Garcia Might, APRN - [facility]   atorvastatin (LIPITOR) 20 MG tablet Take 1 tablet by mouth daily 1/8/20  Yes Jake Garcia Might, APRN - [facility]   albuterol sulfate HFA (VENTOLIN HFA) 108 (90 Base) MCG/ACT inhaler Inhale 2 puffs into the lungs every 6 hours as needed for Wheezing 12/12/19  Yes Jake Sanchez, APRN - [facility]        Allergies:       Codeine    Social History:     Tobacco:    reports that she has been smoking cigarettes. She has a 5.50 pack-year smoking history. She has never used smokeless tobacco.  Alcohol:      reports no history of alcohol use. Drug Use:  reports no history of drug use.     Family History:     Family History   Problem Relation Age of Onset    Diabetes Mother     Thyroid Disease Mother     Arthritis Mother     Cancer Father     Stroke Father     Asthma Father     Emphysema Father     Seizures Sister        Review of Systems:     Positive and Negative as described in HPI    Review of Systems   Constitutional: Negative for appetite change, chills, fatigue, fever and unexpected weight change. HENT: Negative for congestion, rhinorrhea and sore throat. Eyes: Negative for visual disturbance. Respiratory: Negative for cough, shortness of breath and wheezing. Cardiovascular: Negative for chest pain and palpitations. Gastrointestinal: Negative for abdominal pain, constipation, diarrhea, nausea and vomiting. Genitourinary: Negative for difficulty urinating and dysuria. Musculoskeletal: Negative for gait problem, myalgias, neck pain and neck stiffness. Skin: Negative for rash. Neurological: Negative for dizziness, focal weakness, syncope, light-headedness and headaches. Psychiatric/Behavioral: Positive for agitation and dysphoric mood. Negative for hallucinations. The patient has insomnia. Physical Exam:   Vitals:  /72 (Site: Left Upper Arm, Position: Sitting, Cuff Size: Medium Adult)   Pulse 71   Temp 97.8 °F (36.6 °C) (Temporal)   Resp 20   Wt 161 lb 9.6 oz (73.3 kg)   LMP 04/07/2008   BMI 26.89 kg/m²     Physical Exam  Constitutional:       Appearance: Normal appearance. She is normal weight. HENT:      Head: Normocephalic. Right Ear: Tympanic membrane, ear canal and external ear normal.      Left Ear: Tympanic membrane, ear canal and external ear normal.      Mouth/Throat:      Mouth: Mucous membranes are moist.   Eyes:      Pupils: Pupils are equal, round, and reactive to light. Cardiovascular:      Rate and Rhythm: Normal rate and regular rhythm. Heart sounds: Normal heart sounds. No murmur. Pulmonary:      Effort: Pulmonary effort is normal. No respiratory distress. Breath sounds: Normal breath sounds. No wheezing. Abdominal:      General: Bowel sounds are normal. There is no distension. Palpations: Abdomen is soft. taking her medications as prescribed. We discussed at length her medication list and specific patient education for each medication. I encouraged her to use her klonopin as needed. - A lumbar xray was ordered today to assess lower back pain and associated bilateral leg radiculopathy. I instructed the patient that she will be called with results. - Patient to return to office in 6 months for a follow up appointment. Labs ordered today. 1.  Barb received counseling on the following healthy behaviors: nutrition, exercise and medication adherence  2. Patient given educational materials - see patient instructions  3. Was a self-tracking handout given in paper form or via [employer]? No  If yes, see orders or list here. 4.  Discussed use, benefit, and side effects of prescribed medications. Barriers to medication compliance addressed. All patient questions answered. Pt voiced understanding. 5.  Reviewed prior labs and health maintenance  6. Continue current medications, diet and exercise. Completed Refills   Requested Prescriptions     Signed Prescriptions Disp Refills    DULoxetine (CYMBALTA) 30 MG extended release capsule 90 capsule 1     Sig: Take 1 capsule by mouth daily    clonazePAM (KLONOPIN) 1 MG tablet 30 tablet 0     Sig: Take 1 tablet by mouth 2 times daily as needed for Anxiety for up to 30 days. Return in about 6 months (around 1/8/2021), or check up.

## 2020-07-08 NOTE — PATIENT INSTRUCTIONS
SURVEY:    You may be receiving a survey from [facility] regarding your visit today. Please complete the survey to enable us to provide the highest quality of care to you and your family. If you cannot score us a very good on any question, please call the office to discuss how we could have made your experience a very good one. Thank you. Patient Education        Learning About High Cholesterol  What is high cholesterol? High cholesterol means that you have too much cholesterol in your blood. Cholesterol is a type of fat. It's needed for many body functions, such as making new cells. Cholesterol is made by your body. It also comes from food you eat. Having high cholesterol can lead to the buildup of plaque in artery walls. This can increase your risk of heart disease and stroke. When your doctor talks about high cholesterol levels, he or she is talking about your total cholesterol and LDL cholesterol (the \"bad\" cholesterol) levels. Your doctor may also speak about HDL (the \"good\" cholesterol) levels. High HDL is linked with a lower risk for heart disease, heart attack, and stroke. Your cholesterol levels help your doctor find out your risk for having a heart attack or stroke. How can you prevent high cholesterol? A heart-healthy lifestyle can help you prevent high cholesterol. This lifestyle helps lower your risk for a heart attack and stroke. · Eat heart-healthy foods. ? Eat fruits, vegetables, whole grains (like oatmeal), dried beans and peas, nuts and seeds, soy products (like tofu), and fat-free or low-fat dairy products. ? Replace butter, margarine, and hydrogenated or partially hydrogenated oils with olive and canola oils. (Canola oil margarine without trans fat is fine.)  ? Replace red meat with fish, poultry, and soy protein (like tofu). ? Limit processed and packaged foods like chips, crackers, and cookies. · Be active. Exercise can improve your cholesterol level.  Get at least 30 minutes of exercise on most days of the week. Walking is a good choice. You also may want to do other activities, such as running, swimming, cycling, or playing tennis or team sports. · Stay at a healthy weight. Lose weight if you need to. · Don't smoke. If you need help quitting, talk to your doctor about stop-smoking programs and medicines. These can increase your chances of quitting for good. How is high cholesterol treated? The goal of treatment is to reduce your chances of having a heart attack or stroke. The goal is not to lower your cholesterol numbers only. · You may make lifestyle changes, such as eating healthy foods, not smoking, losing weight, and being more active. · You may have to take medicine. Follow-up care is a key part of your treatment and safety. Be sure to make and go to all appointments, and call your doctor if you are having problems. It's also a good idea to know your test results and keep a list of the medicines you take. Where can you learn more? Go to https://"[employer]. org and sign in to your [employer] account. Enter A785 in the [employer] box to learn more about \"Learning About High Cholesterol. \"     If you do not have an account, please click on the \"Sign Up Now\" link. Current as of: December 16, 2019               Content Version: 12.5  © 5340-4919 [employer]wise, Inc[employer]. Care instructions adapted under license by [facility] [place] ([facility] and the [facility]). If you have questions about a medical condition or this instruction, always ask your healthcare professional. [place] [address] any warranty or liability for your use of this information. Patient Education        High Cholesterol: Care Instructions  Your Care Instructions     Cholesterol is a type of fat in your blood. It is needed for many body functions, such as making new cells. Cholesterol is made by your body. It also comes from food you eat.  High cholesterol means that you have too much of the fat in your blood. This raises your risk of a heart attack and stroke. LDL and HDL are part of your total cholesterol. LDL is the \"bad\" cholesterol. High LDL can raise your risk for heart disease, heart attack, and stroke. HDL is the \"good\" cholesterol. It helps clear bad cholesterol from the body. High HDL is linked with a lower risk of heart disease, heart attack, and stroke. Your cholesterol levels help your doctor find out your risk for having a heart attack or stroke. You and your doctor can talk about whether you need to lower your risk and what treatment is best for you. A heart-healthy lifestyle along with medicines can help lower your cholesterol and your risk. The way you choose to lower your risk will depend on how high your risk is for heart attack and stroke. It will also depend on how you feel about taking medicines. Follow-up care is a key part of your treatment and safety. Be sure to make and go to all appointments, and call your doctor if you are having problems. It's also a good idea to know your test results and keep a list of the medicines you take. How can you care for yourself at home? · Eat a variety of foods every day. Good choices include fruits, vegetables, whole grains (like oatmeal), dried beans and peas, nuts and seeds, soy products (like tofu), and fat-free or low-fat dairy products. · Replace butter, margarine, and hydrogenated or partially hydrogenated oils with olive and canola oils. (Canola oil margarine without trans fat is fine.)  · Replace red meat with fish, poultry, and soy protein (like tofu). · Limit processed and packaged foods like chips, crackers, and cookies. · Bake, broil, or steam foods. Don't beckett them. · Be physically active. Get at least 30 minutes of exercise on most days of the week. Walking is a good choice. You also may want to do other activities, such as running, swimming, cycling, or playing tennis or team sports.   · Stay at a healthy

## 2020-07-21 RX ORDER — IBUPROFEN 800 MG/1
800 TABLET ORAL 3 TIMES DAILY PRN
Qty: 30 TABLET | Refills: 0 | Status: SHIPPED | OUTPATIENT
Start: 2020-07-21 | End: 2020-09-22 | Stop reason: SDUPTHER

## 2020-07-21 NOTE — TELEPHONE ENCOUNTER
Patient was moving furniture yesterday, Lucilla Hurst some back discomfort\". Stated does not need to go to ER. Health Maintenance   Topic Date Due    DTaP/Tdap/Td vaccine (1 - Tdap) 12/12/2020 (Originally 4/10/1992)    HIV screen  12/12/2020 (Originally 4/10/1988)    Flu vaccine (1) 09/01/2020    Lipid screen  07/08/2021    Pneumococcal 0-64 years Vaccine  Completed    Hepatitis A vaccine  Aged Out    Hepatitis B vaccine  Aged Out    Hib vaccine  Aged Out    Meningococcal (ACWY) vaccine  Aged Out             (applicable per patient's age: Cancer Screenings, Depression Screening, Fall Risk Screening, Immunizations)    Microalb/Crt.  Ratio (mcg/mg creat)   Date Value   09/19/2017 16     LDL Cholesterol (mg/dL)   Date Value   07/08/2020 151 (H)     AST (U/L)   Date Value   07/08/2020 18     ALT (U/L)   Date Value   07/08/2020 12     BUN (mg/dL)   Date Value   12/31/2019 8      (goal A1C is < 7)   (goal LDL is <100) need 30-50% reduction from baseline     BP Readings from Last 3 Encounters:   07/08/20 103/72   03/16/20 107/78   02/23/20 103/73    (goal /80)      All Future Testing planned in [facility]:  Lab Frequency Next Occurrence   XR LUMBAR SPINE (2-3 VIEWS) Once 78/14/8970   Basic Metabolic Panel Once 08/97/6950   CBC Auto Differential Once 01/04/2021   Lipid Panel Once 01/04/2021   AST Once 01/08/2021   ALT Once 01/08/2021       Next Visit Date:  Future Appointments   Date Time Provider Claudio Baeza   1/11/2021  2:40 PM Floyce Rubinstein Might, APRN - CNP [facility]            Patient Active Problem List:     Insomnia     Depression     Anxiety associated with depression     Tobacco abuse     Dyslipidemia     Dysphagia     Family history of throat cancer     Gastroesophageal reflux disease     H/O: hysterectomy     Mild intermittent asthma without complication

## 2020-07-22 RX ORDER — ARIPIPRAZOLE 5 MG/1
TABLET ORAL
Qty: 90 TABLET | Refills: 1 | Status: SHIPPED | OUTPATIENT
Start: 2020-07-22 | End: 2020-11-12 | Stop reason: SDUPTHER

## 2020-07-22 NOTE — TELEPHONE ENCOUNTER
Health Maintenance   Topic Date Due    DTaP/Tdap/Td vaccine (1 - Tdap) 12/12/2020 (Originally 4/10/1992)    HIV screen  12/12/2020 (Originally 4/10/1988)    Flu vaccine (1) 09/01/2020    Lipid screen  07/08/2021    Pneumococcal 0-64 years Vaccine  Completed    Hepatitis A vaccine  Aged Out    Hepatitis B vaccine  Aged Out    Hib vaccine  Aged Out    Meningococcal (ACWY) vaccine  Aged Out             (applicable per patient's age: Cancer Screenings, Depression Screening, Fall Risk Screening, Immunizations)    Microalb/Crt.  Ratio (mcg/mg creat)   Date Value   09/19/2017 16     LDL Cholesterol (mg/dL)   Date Value   07/08/2020 151 (H)     AST (U/L)   Date Value   07/08/2020 18     ALT (U/L)   Date Value   07/08/2020 12     BUN (mg/dL)   Date Value   12/31/2019 8      (goal A1C is < 7)   (goal LDL is <100) need 30-50% reduction from baseline     BP Readings from Last 3 Encounters:   07/08/20 103/72   03/16/20 107/78   02/23/20 103/73    (goal /80)      All Future Testing planned in [facility]:  Lab Frequency Next Occurrence   XR LUMBAR SPINE (2-3 VIEWS) Once 20/87/4010   Basic Metabolic Panel Once 87/79/0265   CBC Auto Differential Once 01/04/2021   Lipid Panel Once 01/04/2021   AST Once 01/08/2021   ALT Once 01/08/2021       Next Visit Date:  Future Appointments   Date Time Provider Claudio Baeza   1/11/2021  2:40 PM Jasmin Sanchez APRN - CNP [facility]            Patient Active Problem List:     Insomnia     Depression     Anxiety associated with depression     Tobacco abuse     Dyslipidemia     Dysphagia     Family history of throat cancer     Gastroesophageal reflux disease     H/O: hysterectomy     Mild intermittent asthma without complication

## 2020-08-14 ENCOUNTER — TELEPHONE (OUTPATIENT)
Dept: PRIMARY CARE CLINIC | Age: 45
End: 2020-08-14

## 2020-09-08 ENCOUNTER — TELEPHONE (OUTPATIENT)
Dept: PRIMARY CARE CLINIC | Age: 45
End: 2020-09-08

## 2020-09-22 ENCOUNTER — HOSPITAL ENCOUNTER (OUTPATIENT)
Age: 45
Setting detail: SPECIMEN
Discharge: HOME | End: 2020-09-22
Payer: MEDICARE

## 2020-09-22 ENCOUNTER — OFFICE VISIT (OUTPATIENT)
Dept: PRIMARY CARE CLINIC | Age: 45
End: 2020-09-22
Payer: MEDICARE

## 2020-09-22 VITALS
HEIGHT: 65 IN | DIASTOLIC BLOOD PRESSURE: 68 MMHG | SYSTOLIC BLOOD PRESSURE: 96 MMHG | HEART RATE: 67 BPM | WEIGHT: 158.2 LBS | TEMPERATURE: 97.3 F | BODY MASS INDEX: 26.36 KG/M2 | RESPIRATION RATE: 20 BRPM | OXYGEN SATURATION: 98 %

## 2020-09-22 LAB
BILIRUBIN, POC: NORMAL
BLOOD URINE, POC: NORMAL
CLARITY, POC: CLEAR
COLOR, POC: NORMAL
GLUCOSE URINE, POC: NORMAL
KETONES, POC: NORMAL
LEUKOCYTE EST, POC: NORMAL
NITRITE, POC: NORMAL
PH, POC: 5.5
PROTEIN, POC: NORMAL
SPECIFIC GRAVITY, POC: 1.03
UROBILINOGEN, POC: 1

## 2020-09-22 PROCEDURE — 99214 OFFICE O/P EST MOD 30 MIN: CPT | Performed by: NURSE PRACTITIONER

## 2020-09-22 PROCEDURE — 87086 URINE CULTURE/COLONY COUNT: CPT

## 2020-09-22 PROCEDURE — G8427 DOCREV CUR MEDS BY ELIG CLIN: HCPCS | Performed by: NURSE PRACTITIONER

## 2020-09-22 PROCEDURE — 4004F PT TOBACCO SCREEN RCVD TLK: CPT | Performed by: NURSE PRACTITIONER

## 2020-09-22 PROCEDURE — G8419 CALC BMI OUT NRM PARAM NOF/U: HCPCS | Performed by: NURSE PRACTITIONER

## 2020-09-22 RX ORDER — HYDROCODONE BITARTRATE AND ACETAMINOPHEN 5; 325 MG/1; MG/1
TABLET ORAL
COMMUNITY
Start: 2020-09-18 | End: 2020-09-22

## 2020-09-22 RX ORDER — CEPHALEXIN 500 MG/1
500 CAPSULE ORAL 2 TIMES DAILY
Qty: 14 CAPSULE | Refills: 0 | Status: SHIPPED | OUTPATIENT
Start: 2020-09-22 | End: 2020-09-29

## 2020-09-22 RX ORDER — IBUPROFEN 800 MG/1
800 TABLET ORAL 3 TIMES DAILY PRN
Qty: 30 TABLET | Refills: 0 | Status: SHIPPED | OUTPATIENT
Start: 2020-09-22 | End: 2022-05-12 | Stop reason: SDUPTHER

## 2020-09-22 RX ORDER — ACETAMINOPHEN 500 MG
500 TABLET ORAL EVERY 6 HOURS PRN
Status: ON HOLD | COMMUNITY
End: 2021-03-17 | Stop reason: HOSPADM

## 2020-09-22 RX ORDER — CLONAZEPAM 1 MG/1
1 TABLET ORAL 2 TIMES DAILY PRN
Qty: 30 TABLET | Refills: 0 | Status: CANCELLED | OUTPATIENT
Start: 2020-09-22 | End: 2020-10-22

## 2020-09-22 RX ORDER — PROMETHAZINE HYDROCHLORIDE 25 MG/1
25 TABLET ORAL 3 TIMES DAILY PRN
Qty: 12 TABLET | Refills: 0 | Status: SHIPPED | OUTPATIENT
Start: 2020-09-22 | End: 2020-09-29

## 2020-09-22 ASSESSMENT — ENCOUNTER SYMPTOMS
ANAL BLEEDING: 0
DIARRHEA: 0
NAUSEA: 1
VOMITING: 0
TROUBLE SWALLOWING: 0
ABDOMINAL PAIN: 1
SHORTNESS OF BREATH: 0
CONSTIPATION: 0
SORE THROAT: 0
COUGH: 0
BLOOD IN STOOL: 0
WHEEZING: 0

## 2020-09-22 NOTE — PATIENT INSTRUCTIONS
SURVEY:    You may be receiving a survey from [facility] regarding your visit today. Please complete the survey to enable us to provide the highest quality of care to you and your family. If you cannot score us a very good on any question, please call the office to discuss how we could have made your experience a very good one. Thank you.

## 2020-09-22 NOTE — PROGRESS NOTES
Name: Adore Kang  : 1975         Chief Complaint:     Chief Complaint   Patient presents with    ED Follow-up     patient was in ER on  for abdominal pain, Cathie Obdulia said it was kidney stones, either I had one or passed one, I don't know\", c/o of dysuria and nausea       History of Present Illness:      Adore Kang is a 50 y.o.  female who presents with ED Follow-up (patient was in ER on  for abdominal pain, Cathie Obdulia said it was kidney stones, either I had one or passed one, I don't know\", c/o of dysuria and nausea)      Barb is here today for a ER follow-up. She was seen in the emergency department on  for nausea and abdominal pain. They did a CT of her abdomen and pelvis that showed mild hydronephrosis likely due to a recently passed stone. She continues to have some mild pain in her right lower quadrant, dysuria and nausea. She reports that her symptoms have slightly improved. She denies any fever, or vomiting admits to intermittent chills . She does report some of the nausea with eating denies any heartburn. She denies any constipation or diarrhea. Prior to this she denies any history of renal stones. Dysuria    This is a new problem. The current episode started in the past 7 days. The problem occurs every urination. The quality of the pain is described as burning. There has been no fever. Associated symptoms include chills, frequency, nausea and urgency. Pertinent negatives include no discharge, flank pain, hematuria or vomiting. Her past medical history is significant for kidney stones. There is no history of catheterization or recurrent UTIs. Past Medical History:     Past Medical History:   Diagnosis Date    Depression     Headache(784.0)     Hypotension     Vasodepressor syncope       Reviewed all health maintenance requirements and ordered appropriate tests  There are no preventive care reminders to display for this patient.     Past Surgical History:     Past Surgical History:   Procedure Laterality Date    HYSTERECTOMY      TUBAL LIGATION          Medications:       Prior to Admission medications    Medication Sig Start Date End Date Taking? Authorizing Provider   acetaminophen (TYLENOL) 500 MG tablet Take 500 mg by mouth every 6 hours as needed for Pain   Yes Historical Provider, MD   cephALEXin (KEFLEX) 500 MG capsule Take 1 capsule by mouth 2 times daily for 7 days 9/22/20 9/29/20 Yes KENDALL Wang CNP   promethazine (PHENERGAN) 25 MG tablet Take 1 tablet by mouth 3 times daily as needed for Nausea 9/22/20 9/29/20 Yes KENDALL Freeman CNP   ibuprofen (ADVIL;MOTRIN) 800 MG tablet Take 1 tablet by mouth 3 times daily as needed for Pain 9/22/20 10/23/20 Yes KENDALL Wang CNP   ARIPiprazole (ABILIFY) 5 MG tablet take 1 tablet by mouth once daily 7/22/20  Yes KENDALL Geller CNP   DULoxetine (CYMBALTA) 30 MG extended release capsule Take 1 capsule by mouth daily 7/8/20  Yes KENDALL Geller CNP   clonazePAM (KLONOPIN) 1 MG tablet Take 1 tablet by mouth 2 times daily as needed for Anxiety for up to 30 days. 7/8/20 9/22/20 Yes KENDALL Geller CNP   DULoxetine (CYMBALTA) 60 MG extended release capsule Take 1 capsule by mouth daily 3/16/20  Yes KENDALL Geller CNP   atorvastatin (LIPITOR) 20 MG tablet Take 1 tablet by mouth daily 1/8/20  Yes KENDALL Geller CNP   albuterol sulfate HFA (VENTOLIN HFA) 108 (90 Base) MCG/ACT inhaler Inhale 2 puffs into the lungs every 6 hours as needed for Wheezing  Patient not taking: Reported on 9/22/2020 12/12/19   KENDALL Geller CNP        Allergies:       Codeine    Social History:     Tobacco:    reports that she has been smoking cigarettes. She has a 5.50 pack-year smoking history. She has never used smokeless tobacco.  Alcohol:      reports no history of alcohol use. Drug Use:  reports no history of drug use.     Family History:     Family History   Problem Relation Age of Onset  Diabetes Mother     Thyroid Disease Mother     Arthritis Mother     Cancer Father     Stroke Father     Asthma Father     Emphysema Father     Seizures Sister        Review of Systems:     Positive and Negative as described in HPI    Review of Systems   Constitutional: Positive for chills. Negative for activity change, fatigue and fever. HENT: Negative for sore throat and trouble swallowing. Respiratory: Negative for cough, shortness of breath and wheezing. Cardiovascular: Negative for chest pain and palpitations. Gastrointestinal: Positive for abdominal pain (right lower quadrant) and nausea. Negative for anal bleeding, blood in stool, constipation, diarrhea and vomiting. Genitourinary: Positive for difficulty urinating, dysuria, frequency and urgency. Negative for decreased urine volume, flank pain, hematuria, vaginal bleeding, vaginal discharge and vaginal pain. Physical Exam:   Vitals:  BP 96/68 (Site: Left Upper Arm, Position: Sitting, Cuff Size: Medium Adult)   Pulse 67   Temp 97.3 °F (36.3 °C) (Temporal)   Resp 20   Ht 5' 5\" (1.651 m)   Wt 158 lb 3.2 oz (71.8 kg)   LMP 04/07/2008   SpO2 98%   BMI 26.33 kg/m²     Physical Exam  Vitals signs and nursing note reviewed. Constitutional:       General: She is not in acute distress. Appearance: Normal appearance. She is obese. She is not toxic-appearing or diaphoretic. HENT:      Head: Normocephalic and atraumatic. Eyes:      General:         Right eye: No discharge. Left eye: No discharge. Conjunctiva/sclera: Conjunctivae normal.   Neck:      Musculoskeletal: Normal range of motion and neck supple. Cardiovascular:      Rate and Rhythm: Normal rate and regular rhythm. Pulmonary:      Effort: Pulmonary effort is normal.      Breath sounds: Normal breath sounds. No wheezing, rhonchi or rales. Abdominal:      Palpations: Abdomen is soft. Tenderness:  There is abdominal tenderness in the right lower quadrant. There is no right CVA tenderness, left CVA tenderness or rebound. Neurological:      General: No focal deficit present. Mental Status: She is alert and oriented to person, place, and time. Psychiatric:         Mood and Affect: Mood normal.         Behavior: Behavior normal.         Data:     Lab Results   Component Value Date     12/31/2019    K 4.2 12/31/2019     12/31/2019    CO2 25 12/31/2019    BUN 8 12/31/2019    CREATININE 0.93 12/31/2019    GLUCOSE 90 12/31/2019    PROT 7.3 09/19/2017    LABALBU 4.2 09/19/2017    BILITOT 0.54 09/19/2017    ALKPHOS 101 09/19/2017    AST 18 07/08/2020    ALT 12 07/08/2020     Lab Results   Component Value Date    WBC 6.6 12/31/2019    RBC 4.08 12/31/2019    HGB 12.9 12/31/2019    HCT 40.4 12/31/2019    MCV 99.0 12/31/2019    MCH 31.6 12/31/2019    MCHC 31.9 12/31/2019    RDW 12.4 12/31/2019     12/31/2019    MPV 9.8 12/31/2019     Lab Results   Component Value Date    TSH 1.40 09/19/2017     Lab Results   Component Value Date    CHOL 222 07/08/2020    HDL 43 07/08/2020       Assessment/Plan:      Diagnosis Orders   1. Acute cystitis with hematuria  cephALEXin (KEFLEX) 500 MG capsule   2. Dysuria  POCT Urinalysis no Micro    Culture, Urine   3. Hydronephrosis, unspecified hydronephrosis type       UA today consistent with acute cystitis. We will treat her with Keflex and send urine out for culture. Will provide her with Phenergan for nausea. If culture does not show growth and she continues to have dysuria we will have her see urology for further assessment. 1.  Barb received counseling on the following healthy behaviors: nutrition, exercise and medication adherence  2. Patient given educational materials - see patient instructions  3. Was a self-tracking handout given in paper form or via [employer]? No  If yes, see orders or list here. 4.  Discussed use, benefit, and side effects of prescribed medications.   Barriers to medication compliance addressed. All patient questions answered. Pt voiced understanding. 5.  Reviewed prior labs and health maintenance  6. Continue current medications, diet and exercise. Completed Refills   Requested Prescriptions     Signed Prescriptions Disp Refills    cephALEXin (KEFLEX) 500 MG capsule 14 capsule 0     Sig: Take 1 capsule by mouth 2 times daily for 7 days    promethazine (PHENERGAN) 25 MG tablet 12 tablet 0     Sig: Take 1 tablet by mouth 3 times daily as needed for Nausea    ibuprofen (ADVIL;MOTRIN) 800 MG tablet 30 tablet 0     Sig: Take 1 tablet by mouth 3 times daily as needed for Pain         No follow-ups on file.

## 2020-09-23 RX ORDER — ATORVASTATIN CALCIUM 20 MG/1
TABLET, FILM COATED ORAL
Qty: 90 TABLET | Refills: 1 | Status: SHIPPED | OUTPATIENT
Start: 2020-09-23 | End: 2021-03-26

## 2020-09-23 NOTE — TELEPHONE ENCOUNTER
Pending meds      Health Maintenance   Topic Date Due    Flu vaccine (1) 10/12/2020 (Originally 9/1/2020)    DTaP/Tdap/Td vaccine (1 - Tdap) 12/12/2020 (Originally 4/10/1992)    HIV screen  12/12/2020 (Originally 4/10/1988)    Lipid screen  07/08/2021    Pneumococcal 0-64 years Vaccine  Completed    Hepatitis A vaccine  Aged Out    Hepatitis B vaccine  Aged Out    Hib vaccine  Aged Out    Meningococcal (ACWY) vaccine  Aged Out             (applicable per patient's age: Cancer Screenings, Depression Screening, Fall Risk Screening, Immunizations)    Microalb/Crt.  Ratio (mcg/mg creat)   Date Value   09/19/2017 16     LDL Cholesterol (mg/dL)   Date Value   07/08/2020 151 (H)     AST (U/L)   Date Value   07/08/2020 18     ALT (U/L)   Date Value   07/08/2020 12     BUN (mg/dL)   Date Value   12/31/2019 8      (goal A1C is < 7)   (goal LDL is <100) need 30-50% reduction from baseline     BP Readings from Last 3 Encounters:   09/22/20 96/68   07/08/20 103/72   03/16/20 107/78    (goal /80)      All Future Testing planned in [facility]:  Lab Frequency Next Occurrence   XR LUMBAR SPINE (2-3 VIEWS) Once 31/77/7993   Basic Metabolic Panel Once 15/94/4902   CBC Auto Differential Once 01/04/2021   Lipid Panel Once 01/04/2021   AST Once 01/08/2021   ALT Once 01/08/2021       Next Visit Date:  Future Appointments   Date Time Provider Claudio Baeza   1/11/2021  2:40 PM Tonia Sanchez, APRN - CNP [facility]            Patient Active Problem List:     Insomnia     Depression     Anxiety associated with depression     Tobacco abuse     Dyslipidemia     Dysphagia     Family history of throat cancer     Gastroesophageal reflux disease     H/O: hysterectomy     Mild intermittent asthma without complication

## 2020-09-24 ENCOUNTER — TELEPHONE (OUTPATIENT)
Dept: PRIMARY CARE CLINIC | Age: 45
End: 2020-09-24

## 2020-09-24 LAB
CULTURE: NO GROWTH
Lab: NORMAL
SPECIMEN DESCRIPTION: NORMAL

## 2020-09-24 NOTE — TELEPHONE ENCOUNTER
Patient returned call and stated that she was not able to start the antibiotic until yesterday. Patient states the burning and pain is about the same but she thinks with maybe more time on the antibiotic she might feel better. Health Maintenance   Topic Date Due    Flu vaccine (1) 10/12/2020 (Originally 9/1/2020)    DTaP/Tdap/Td vaccine (1 - Tdap) 12/12/2020 (Originally 4/10/1992)    HIV screen  12/12/2020 (Originally 4/10/1988)    Lipid screen  07/08/2021    Pneumococcal 0-64 years Vaccine  Completed    Hepatitis A vaccine  Aged Out    Hepatitis B vaccine  Aged Out    Hib vaccine  Aged Out    Meningococcal (ACWY) vaccine  Aged Out             (applicable per patient's age: Cancer Screenings, Depression Screening, Fall Risk Screening, Immunizations)    Microalb/Crt.  Ratio (mcg/mg creat)   Date Value   09/19/2017 16     LDL Cholesterol (mg/dL)   Date Value   07/08/2020 151 (H)     AST (U/L)   Date Value   07/08/2020 18     ALT (U/L)   Date Value   07/08/2020 12     BUN (mg/dL)   Date Value   12/31/2019 8      (goal A1C is < 7)   (goal LDL is <100) need 30-50% reduction from baseline     BP Readings from Last 3 Encounters:   09/22/20 96/68   07/08/20 103/72   03/16/20 107/78    (goal /80)      All Future Testing planned in [facility]:  Lab Frequency Next Occurrence   XR LUMBAR SPINE (2-3 VIEWS) Once 10/35/5136   Basic Metabolic Panel Once 02/68/8133   CBC Auto Differential Once 01/04/2021   Lipid Panel Once 01/04/2021   AST Once 01/08/2021   ALT Once 01/08/2021       Next Visit Date:  Future Appointments   Date Time Provider Claudio Baeza   1/11/2021  2:40 PM Geetha Sanchez, APRN - CNP [facility]            Patient Active Problem List:     Insomnia     Depression     Anxiety associated with depression     Tobacco abuse     Dyslipidemia     Dysphagia     Family history of throat cancer     Gastroesophageal reflux disease     H/O: hysterectomy     Mild intermittent asthma without complication

## 2020-09-24 NOTE — TELEPHONE ENCOUNTER
Called patient and informed her that her urine did not show any bacteria and the antibiotic will not help and that Debbie Santana is referring her to the urologist and that office is located at [facility] and they will be calling her to schedule an appt. Verbalizes understanding.

## 2020-09-24 NOTE — TELEPHONE ENCOUNTER
Can you let Barb know that the antibiotic is not going to help because there is no bacteria in her urine. I am going to refer her to see urology given a CT of her abdomen showing recent passing of stone and hydronephrosis.   Thank you

## 2020-09-24 NOTE — TELEPHONE ENCOUNTER
Called patient and left message that her urine culture did not grow any bacteria and Demetrius wanted to know how she is feeling.

## 2020-10-05 RX ORDER — CLONAZEPAM 1 MG/1
1 TABLET ORAL 2 TIMES DAILY PRN
Qty: 30 TABLET | Refills: 0 | Status: SHIPPED | OUTPATIENT
Start: 2020-10-05 | End: 2021-01-11 | Stop reason: SDUPTHER

## 2020-10-05 NOTE — TELEPHONE ENCOUNTER
Controlled Substance Monitoring:    Acute and Chronic Pain Monitoring:   RX Monitoring 10/5/2020   Periodic Controlled Substance Monitoring No signs of potential drug abuse or diversion identified.

## 2020-10-07 ENCOUNTER — TELEPHONE (OUTPATIENT)
Dept: PRIMARY CARE CLINIC | Age: 45
End: 2020-10-07

## 2020-11-09 ENCOUNTER — TELEPHONE (OUTPATIENT)
Dept: PRIMARY CARE CLINIC | Age: 45
End: 2020-11-09

## 2020-11-12 ENCOUNTER — OFFICE VISIT (OUTPATIENT)
Dept: PRIMARY CARE CLINIC | Age: 45
End: 2020-11-12
Payer: MEDICARE

## 2020-11-12 VITALS
TEMPERATURE: 97.8 F | HEART RATE: 64 BPM | BODY MASS INDEX: 25.61 KG/M2 | WEIGHT: 153.9 LBS | DIASTOLIC BLOOD PRESSURE: 60 MMHG | RESPIRATION RATE: 20 BRPM | SYSTOLIC BLOOD PRESSURE: 104 MMHG

## 2020-11-12 PROCEDURE — 90471 IMMUNIZATION ADMIN: CPT | Performed by: NURSE PRACTITIONER

## 2020-11-12 PROCEDURE — 90688 IIV4 VACCINE SPLT 0.5 ML IM: CPT | Performed by: NURSE PRACTITIONER

## 2020-11-12 PROCEDURE — G8427 DOCREV CUR MEDS BY ELIG CLIN: HCPCS | Performed by: NURSE PRACTITIONER

## 2020-11-12 PROCEDURE — G8482 FLU IMMUNIZE ORDER/ADMIN: HCPCS | Performed by: NURSE PRACTITIONER

## 2020-11-12 PROCEDURE — G8419 CALC BMI OUT NRM PARAM NOF/U: HCPCS | Performed by: NURSE PRACTITIONER

## 2020-11-12 PROCEDURE — 99214 OFFICE O/P EST MOD 30 MIN: CPT | Performed by: NURSE PRACTITIONER

## 2020-11-12 PROCEDURE — 4004F PT TOBACCO SCREEN RCVD TLK: CPT | Performed by: NURSE PRACTITIONER

## 2020-11-12 RX ORDER — ARIPIPRAZOLE 10 MG/1
10 TABLET ORAL DAILY
Qty: 90 TABLET | Refills: 1 | Status: SHIPPED | OUTPATIENT
Start: 2020-11-12 | End: 2021-05-13 | Stop reason: SDUPTHER

## 2020-11-12 ASSESSMENT — ENCOUNTER SYMPTOMS
CONSTIPATION: 0
DIARRHEA: 0
SHORTNESS OF BREATH: 0
VISUAL CHANGE: 0
RHINORRHEA: 0
ABDOMINAL PAIN: 0
COUGH: 0
VOMITING: 0
WHEEZING: 0
SORE THROAT: 0
NAUSEA: 0

## 2020-11-12 NOTE — PROGRESS NOTES
Name: Kristy Griffith  : 1975         Chief Complaint:     Chief Complaint   Patient presents with   [address] Problem     last month gotten really bad       History of Present Illness:      Kristy Griffith is a 50 y.o.  female who presents with Mental Health Problem (last month gotten really bad)      Barb is here today for today for a follow up visit. Dysthymia-worsening, patient states she recently had a break-up of her marriage of 28 years, they lost their house and she is now living with her sister, and her daughter who is an adult moved out of the house. She is very down and feels like sleeping all the time. She states her anxiety is under control but she has trouble with motivation. See below for further comment. Mental Health Problem   The primary symptoms include dysphoric mood. The primary symptoms do not include delusions, hallucinations, bizarre behavior, disorganized speech, negative symptoms or somatic symptoms. The current episode started more than 1 month ago. This is a chronic problem. The onset of the illness is precipitated by a stressful event and emotional stress (DEATH OF SISTER, CORONAVIRUS SCARE). The degree of incapacity that she is experiencing as a consequence of her illness is moderate. Sequelae of the illness include an inability to work and harmed interpersonal relations. Additional symptoms of the illness include anhedonia, insomnia, fatigue, attention impairment and distractible. Additional symptoms of the illness do not include hypersomnia, appetite change, unexpected weight change, agitation, psychomotor retardation, feelings of worthlessness, euphoric mood, increased goal-directed activity, flight of ideas, inflated self-esteem, decreased need for sleep, poor judgment, visual change, headaches, abdominal pain or seizures. She does not admit to suicidal ideas. She does not have a plan to attempt suicide. She contemplates harming herself.  She has not already injured self. She does not contemplate injuring another person. She has not already  injured another person. Risk factors that are present for mental illness include a history of mental illness and a family history of mental illness. Past Medical History:     Past Medical History:   Diagnosis Date    Depression     Headache(784.0)     Hypotension     Vasodepressor syncope       Reviewed all health maintenance requirements and ordered appropriate tests  Health Maintenance Due   Topic Date Due    Flu vaccine (1) 09/01/2020       Past Surgical History:     Past Surgical History:   Procedure Laterality Date    HYSTERECTOMY      TUBAL LIGATION          Medications:       Prior to Admission medications    Medication Sig Start Date End Date Taking? Authorizing Provider   ARIPiprazole (ABILIFY) 10 MG tablet Take 1 tablet by mouth daily 11/12/20  Yes Debbrah Kayser Might, APRN - CNP   clonazePAM (KLONOPIN) 1 MG tablet Take 1 tablet by mouth 2 times daily as needed for Anxiety for up to 30 days.  10/5/20 11/12/20 Yes Debbrah Kayser Might, APRN - CNP   atorvastatin (LIPITOR) 20 MG tablet take 1 tablet by mouth once daily 9/23/20  Yes Debbrah Kayser Might, APRN - CNP   acetaminophen (TYLENOL) 500 MG tablet Take 500 mg by mouth every 6 hours as needed for Pain   Yes Historical Provider, MD   ibuprofen (ADVIL;MOTRIN) 800 MG tablet Take 1 tablet by mouth 3 times daily as needed for Pain 9/22/20 11/12/20 Yes KENDALL Wang CNP   DULoxetine (CYMBALTA) 30 MG extended release capsule Take 1 capsule by mouth daily 7/8/20  Yes Debbrah Kayser Might, APRN - CNP   DULoxetine (CYMBALTA) 60 MG extended release capsule Take 1 capsule by mouth daily 3/16/20  Yes Debbrah Kayser Might, APRN - CNP   albuterol sulfate HFA (VENTOLIN HFA) 108 (90 Base) MCG/ACT inhaler Inhale 2 puffs into the lungs every 6 hours as needed for Wheezing 12/12/19  Yes Debbrah Kayser Might, APRN - CNP        Allergies:       Codeine    Social History:     Tobacco:    reports that she has been smoking cigarettes. She has a 5.50 pack-year smoking history. She has never used smokeless tobacco.  Alcohol:      reports no history of alcohol use. Drug Use:  reports no history of drug use. Family History:     Family History   Problem Relation Age of Onset    Diabetes Mother     Thyroid Disease Mother     Arthritis Mother     Cancer Father     Stroke Father     Asthma Father     Emphysema Father     Seizures Sister        Review of Systems:     Positive and Negative as described in HPI    Review of Systems   Constitutional: Positive for fatigue. Negative for appetite change, chills, fever and unexpected weight change. HENT: Negative for congestion, rhinorrhea and sore throat. Eyes: Negative for visual disturbance. Respiratory: Negative for cough, shortness of breath and wheezing. Cardiovascular: Negative for chest pain and palpitations. Gastrointestinal: Negative for abdominal pain, constipation, diarrhea, nausea and vomiting. Genitourinary: Negative for difficulty urinating and dysuria. Musculoskeletal: Negative for neck pain and neck stiffness. Skin: Negative for rash. Neurological: Negative for dizziness, seizures, syncope, light-headedness and headaches. Psychiatric/Behavioral: Positive for decreased concentration, dysphoric mood and sleep disturbance. Negative for agitation, behavioral problems, confusion, hallucinations, self-injury and suicidal ideas. The patient is nervous/anxious and has insomnia. The patient is not hyperactive. Physical Exam:   Vitals:  /60   Pulse 64   Temp 97.8 °F (36.6 °C) (Temporal)   Resp 20   Wt 153 lb 14.4 oz (69.8 kg)   LMP 04/07/2008   BMI 25.61 kg/m²     Physical Exam  Vitals signs and nursing note reviewed. Constitutional:       General: She is not in acute distress. Appearance: Normal appearance. She is not ill-appearing.    HENT:      Mouth/Throat:      Mouth: Mucous membranes are moist.   Eyes:      General: No scleral icterus. Neck:      Musculoskeletal: Normal range of motion and neck supple. Cardiovascular:      Rate and Rhythm: Normal rate and regular rhythm. Heart sounds: No murmur. Pulmonary:      Effort: Pulmonary effort is normal.      Breath sounds: Normal breath sounds. No wheezing. Musculoskeletal:      Right lower leg: No edema. Left lower leg: No edema. Skin:     General: Skin is warm and dry. Findings: No rash. Neurological:      General: No focal deficit present. Mental Status: She is alert and oriented to person, place, and time. Psychiatric:         Attention and Perception: Attention and perception normal.         Mood and Affect: Mood is depressed (mild). Mood is not anxious. Speech: Speech normal.         Behavior: Behavior normal. Behavior is cooperative. Thought Content: Thought content normal. Thought content does not include homicidal or suicidal ideation. Thought content does not include homicidal or suicidal plan. Cognition and Memory: Cognition normal.         Judgment: Judgment normal.         Data:     Lab Results   Component Value Date     12/31/2019    K 4.2 12/31/2019     12/31/2019    CO2 25 12/31/2019    BUN 8 12/31/2019    CREATININE 0.93 12/31/2019    GLUCOSE 90 12/31/2019    PROT 7.3 09/19/2017    LABALBU 4.2 09/19/2017    BILITOT 0.54 09/19/2017    ALKPHOS 101 09/19/2017    AST 18 07/08/2020    ALT 12 07/08/2020     Lab Results   Component Value Date    WBC 6.6 12/31/2019    RBC 4.08 12/31/2019    HGB 12.9 12/31/2019    HCT 40.4 12/31/2019    MCV 99.0 12/31/2019    MCH 31.6 12/31/2019    MCHC 31.9 12/31/2019    RDW 12.4 12/31/2019     12/31/2019    MPV 9.8 12/31/2019     Lab Results   Component Value Date    TSH 1.40 09/19/2017     Lab Results   Component Value Date    CHOL 222 07/08/2020    HDL 43 07/08/2020       Assessment/Plan:      Diagnosis Orders   1. Dysthymia  ARIPiprazole (ABILIFY) 10 MG tablet   2. Need for vaccination  INFLUENZA, QUADV, 3 YRS AND OLDER, IM, MDV, 0.5ML (Kimberly Villegas)     She is advised to seek counseling. We will increase Abilify to 10 mg daily. She will call in a few weeks with progress. We will follow-up in a month. 1.  Barb received counseling on the following healthy behaviors: nutrition, exercise and medication adherence  2. Patient given educational materials - see patient instructions  3. Was a self-tracking handout given in paper form or via [employer]? No  If yes, see orders or list here. 4.  Discussed use, benefit, and side effects of prescribed medications. Barriers to medication compliance addressed. All patient questions answered. Pt voiced understanding. 5.  Reviewed prior labs and health maintenance  6. Continue current medications, diet and exercise. Completed Refills   Requested Prescriptions     Signed Prescriptions Disp Refills    ARIPiprazole (ABILIFY) 10 MG tablet 90 tablet 1     Sig: Take 1 tablet by mouth daily         Return if symptoms worsen or fail to improve.

## 2020-11-12 NOTE — PATIENT INSTRUCTIONS
SURVEY:    You may be receiving a survey from [employer] regarding your visit today. Please complete the survey to enable us to provide the highest quality of care to you and your family. If you cannot score us a very good on any question, please call the office to discuss how we could have made your experience a very good one. Thank you.

## 2020-11-12 NOTE — PROGRESS NOTES
After obtaining consent, and per orders of LETHA BRENNER [facility] Might, injection offlu given in Right deltoid by Girish Reyes. Patient instructed to remain in clinic for 20 minutes afterwards, and to report any adverse reaction to me immediately. Vaccine Information Sheet, \"Influenza - Inactivated\"  given to Jane Fisher, or parent/legal guardian of  Jane Fisher and verbalized understanding. Patient responses:    Have you ever had a reaction to a flu vaccine? No  Are you able to eat eggs without adverse effects? Yes  Do you have any current illness? No  Have you ever had Guillian Warren Syndrome? No    Flu vaccine given per order. Please see immunization tab.

## 2020-11-18 ENCOUNTER — OFFICE VISIT (OUTPATIENT)
Dept: PRIMARY CARE CLINIC | Age: 45
End: 2020-11-18
Payer: MEDICARE

## 2020-11-18 VITALS
TEMPERATURE: 97.3 F | HEIGHT: 65 IN | WEIGHT: 157 LBS | SYSTOLIC BLOOD PRESSURE: 106 MMHG | OXYGEN SATURATION: 97 % | RESPIRATION RATE: 20 BRPM | HEART RATE: 86 BPM | DIASTOLIC BLOOD PRESSURE: 68 MMHG | BODY MASS INDEX: 26.16 KG/M2

## 2020-11-18 PROCEDURE — G8427 DOCREV CUR MEDS BY ELIG CLIN: HCPCS | Performed by: NURSE PRACTITIONER

## 2020-11-18 PROCEDURE — G8419 CALC BMI OUT NRM PARAM NOF/U: HCPCS | Performed by: NURSE PRACTITIONER

## 2020-11-18 PROCEDURE — 99213 OFFICE O/P EST LOW 20 MIN: CPT | Performed by: NURSE PRACTITIONER

## 2020-11-18 PROCEDURE — 4004F PT TOBACCO SCREEN RCVD TLK: CPT | Performed by: NURSE PRACTITIONER

## 2020-11-18 PROCEDURE — G8482 FLU IMMUNIZE ORDER/ADMIN: HCPCS | Performed by: NURSE PRACTITIONER

## 2020-11-18 RX ORDER — CEPHALEXIN 500 MG/1
500 CAPSULE ORAL 4 TIMES DAILY
Qty: 28 CAPSULE | Refills: 0 | Status: SHIPPED | OUTPATIENT
Start: 2020-11-18 | End: 2020-11-25

## 2020-11-18 RX ORDER — SULFAMETHOXAZOLE AND TRIMETHOPRIM 800; 160 MG/1; MG/1
1 TABLET ORAL 2 TIMES DAILY
Qty: 14 TABLET | Refills: 0 | Status: SHIPPED | OUTPATIENT
Start: 2020-11-18 | End: 2020-11-25

## 2020-11-18 ASSESSMENT — ENCOUNTER SYMPTOMS
VOMITING: 0
COLOR CHANGE: 1
NAUSEA: 0

## 2020-11-18 NOTE — PROGRESS NOTES
[address] [facility] WALK-IN [facility]  [address] [facility] [address] [facility]  Dept: [phone]  Dept Fax: [phone]    Kristy Griffith is a 50 y.o. female who presentsto the [facility] in Care today for her medical conditions/complaints as noted below. Kristy Griffith is c/o of Other (X's 2 days, inner L thigh painful bump)      HPI:     Barb is here today for a walk-in visit. She reports over the last 2 days she noticed a lump near her left groin that has progressively gotten bigger more red and painful. She denies any drainage from the site. She denies any fever, malaise, chills, nausea or vomiting. She has not tried putting anything on the area. She denies any history of abscess or MRSA. She denies any trauma or puncture of the area. Past Medical History:   Diagnosis Date    Depression     Headache(784.0)     Hypotension     Vasodepressor syncope         Current Outpatient Medications   Medication Sig Dispense Refill    sulfamethoxazole-trimethoprim (BACTRIM DS;SEPTRA DS) 800-160 MG per tablet Take 1 tablet by mouth 2 times daily for 7 days 14 tablet 0    cephALEXin (KEFLEX) 500 MG capsule Take 1 capsule by mouth 4 times daily for 7 days 28 capsule 0    ARIPiprazole (ABILIFY) 10 MG tablet Take 1 tablet by mouth daily 90 tablet 1    atorvastatin (LIPITOR) 20 MG tablet take 1 tablet by mouth once daily 90 tablet 1    acetaminophen (TYLENOL) 500 MG tablet Take 500 mg by mouth every 6 hours as needed for Pain      DULoxetine (CYMBALTA) 30 MG extended release capsule Take 1 capsule by mouth daily 90 capsule 1    DULoxetine (CYMBALTA) 60 MG extended release capsule Take 1 capsule by mouth daily 90 capsule 3    albuterol sulfate HFA (VENTOLIN HFA) 108 (90 Base) MCG/ACT inhaler Inhale 2 puffs into the lungs every 6 hours as needed for Wheezing 1 Inhaler 3    clonazePAM (KLONOPIN) 1 MG tablet Take 1 tablet by mouth 2 times daily as needed for Anxiety for up to 30 days.  27 tablet 0    ibuprofen (ADVIL;MOTRIN) 800 MG tablet Take 1 tablet by mouth 3 times daily as needed for Pain 30 tablet 0     No current facility-administered medications for this visit. Allergies   Allergen Reactions    Codeine Itching       Subjective:      Review of Systems   Constitutional: Negative for activity change, appetite change, chills and fever. Gastrointestinal: Negative for nausea and vomiting. Musculoskeletal: Negative for myalgias. Skin: Positive for color change and wound. Objective:     Physical Exam  Constitutional:       General: She is not in acute distress. Appearance: Normal appearance. She is not toxic-appearing or diaphoretic. Cardiovascular:      Rate and Rhythm: Normal rate and regular rhythm. Pulmonary:      Effort: Pulmonary effort is normal.      Breath sounds: Normal breath sounds. No wheezing, rhonchi or rales. Skin:         Neurological:      General: No focal deficit present. Mental Status: She is alert. Psychiatric:         Mood and Affect: Mood normal.         Behavior: Behavior normal.       /68 (Site: Left Upper Arm, Position: Sitting, Cuff Size: Large Adult)   Pulse 86   Temp 97.3 °F (36.3 °C) (Temporal)   Resp 20   Ht 5' 5\" (1.651 m)   Wt 157 lb (71.2 kg)   LMP 04/07/2008   SpO2 97%   BMI 26.13 kg/m²     Assessment:      Diagnosis Orders   1. Abscess  sulfamethoxazole-trimethoprim (BACTRIM DS;SEPTRA DS) 800-160 MG per tablet    cephALEXin (KEFLEX) 500 MG capsule   2. Cellulitis of groin  sulfamethoxazole-trimethoprim (BACTRIM DS;SEPTRA DS) 800-160 MG per tablet    cephALEXin (KEFLEX) 500 MG capsule     Will treat with Bactrim and Keflex. Advised to use warm compresses to the area. Advised her to contact office if no improvement in the next 24-48 hrs and we will have her see general surgery to drain the abscess. She verbalized understanding.   Plan:         Discussed exam, POCT findings, plan of care (including prescriptive and supportive as listed below) and follow-up atlength with patient. Reviewed all prescribed and recommended medications, administration and side effects. Encouraged to return to [address] [place] [address] for noimprovement and or worsening of symptoms. To ER or call 911 if any difficulty breathing, shortness of breath, inability to swallow, hives or temp greater than 103 degrees. Questions answered. They verbalized understandingand agreement. Return if symptoms worsen or fail to improve. Orders Placed This Encounter   Medications    sulfamethoxazole-trimethoprim (BACTRIM DS;SEPTRA DS) 800-160 MG per tablet     Sig: Take 1 tablet by mouth 2 times daily for 7 days     Dispense:  14 tablet     Refill:  0    cephALEXin (KEFLEX) 500 MG capsule     Sig: Take 1 capsule by mouth 4 times daily for 7 days     Dispense:  28 capsule     Refill:  0          All patient questions answered. Pt voiced understanding.       Electronically signed by KENDALL Benito CNP on 11/18/2020 at 4:38 PM

## 2020-12-01 RX ORDER — ALBUTEROL SULFATE 90 UG/1
2 AEROSOL, METERED RESPIRATORY (INHALATION) EVERY 6 HOURS PRN
Qty: 1 INHALER | Refills: 3 | Status: SHIPPED | OUTPATIENT
Start: 2020-12-01 | End: 2022-01-31 | Stop reason: SDUPTHER

## 2020-12-01 NOTE — TELEPHONE ENCOUNTER
Phone call from patient requesting refill. Health Maintenance   Topic Date Due    DTaP/Tdap/Td vaccine (1 - Tdap) 12/12/2020 (Originally 4/10/1992)    HIV screen  12/12/2020 (Originally 4/10/1988)    Lipid screen  07/08/2021    Flu vaccine  Completed    Pneumococcal 0-64 years Vaccine  Completed    Hepatitis A vaccine  Aged Out    Hepatitis B vaccine  Aged Out    Hib vaccine  Aged Out    Meningococcal (ACWY) vaccine  Aged Out             (applicable per patient's age: Cancer Screenings, Depression Screening, Fall Risk Screening, Immunizations)    Microalb/Crt.  Ratio (mcg/mg creat)   Date Value   09/19/2017 16     LDL Cholesterol (mg/dL)   Date Value   07/08/2020 151 (H)     AST (U/L)   Date Value   07/08/2020 18     ALT (U/L)   Date Value   07/08/2020 12     BUN (mg/dL)   Date Value   12/31/2019 8      (goal A1C is < 7)   (goal LDL is <100) need 30-50% reduction from baseline     BP Readings from Last 3 Encounters:   11/18/20 106/68   11/12/20 104/60   09/22/20 96/68    (goal /80)      All Future Testing planned in [facility]:  Lab Frequency Next Occurrence   XR LUMBAR SPINE (2-3 VIEWS) Once 78/62/7166   Basic Metabolic Panel Once 89/42/6321   CBC Auto Differential Once 01/04/2021   Lipid Panel Once 01/04/2021   AST Once 01/08/2021   ALT Once 01/08/2021       Next Visit Date:  Future Appointments   Date Time Provider Claudio Baeza   1/11/2021  2:40 PM Rita Sanchez APRN - CNP [facility]            Patient Active Problem List:     Insomnia     Depression     Anxiety associated with depression     Tobacco abuse     Dyslipidemia     Dysphagia     Family history of throat cancer     Gastroesophageal reflux disease     H/O: hysterectomy     Mild intermittent asthma without complication

## 2021-01-11 ENCOUNTER — OFFICE VISIT (OUTPATIENT)
Dept: PRIMARY CARE CLINIC | Age: 46
End: 2021-01-11
Payer: MEDICARE

## 2021-01-11 VITALS
HEIGHT: 65 IN | DIASTOLIC BLOOD PRESSURE: 64 MMHG | OXYGEN SATURATION: 99 % | HEART RATE: 80 BPM | RESPIRATION RATE: 18 BRPM | TEMPERATURE: 98.7 F | SYSTOLIC BLOOD PRESSURE: 102 MMHG | BODY MASS INDEX: 25.18 KG/M2 | WEIGHT: 151.1 LBS

## 2021-01-11 DIAGNOSIS — E78.5 DYSLIPIDEMIA: ICD-10-CM

## 2021-01-11 DIAGNOSIS — J45.40 MODERATE PERSISTENT ASTHMA WITHOUT COMPLICATION: ICD-10-CM

## 2021-01-11 DIAGNOSIS — F34.1 DYSTHYMIA: Primary | ICD-10-CM

## 2021-01-11 PROBLEM — J20.9 ACUTE BRONCHITIS WITH COPD (HCC): Status: RESOLVED | Noted: 2021-01-11 | Resolved: 2021-01-11

## 2021-01-11 PROBLEM — J44.0 ACUTE BRONCHITIS WITH COPD (HCC): Status: RESOLVED | Noted: 2021-01-11 | Resolved: 2021-01-11

## 2021-01-11 PROBLEM — J44.0 ACUTE BRONCHITIS WITH COPD (HCC): Status: ACTIVE | Noted: 2021-01-11

## 2021-01-11 PROBLEM — J20.9 ACUTE BRONCHITIS WITH COPD (HCC): Status: ACTIVE | Noted: 2021-01-11

## 2021-01-11 PROCEDURE — G8427 DOCREV CUR MEDS BY ELIG CLIN: HCPCS | Performed by: NURSE PRACTITIONER

## 2021-01-11 PROCEDURE — 99214 OFFICE O/P EST MOD 30 MIN: CPT | Performed by: NURSE PRACTITIONER

## 2021-01-11 PROCEDURE — 4004F PT TOBACCO SCREEN RCVD TLK: CPT | Performed by: NURSE PRACTITIONER

## 2021-01-11 PROCEDURE — G8419 CALC BMI OUT NRM PARAM NOF/U: HCPCS | Performed by: NURSE PRACTITIONER

## 2021-01-11 PROCEDURE — G8482 FLU IMMUNIZE ORDER/ADMIN: HCPCS | Performed by: NURSE PRACTITIONER

## 2021-01-11 RX ORDER — CLONAZEPAM 1 MG/1
1 TABLET ORAL 2 TIMES DAILY PRN
Qty: 30 TABLET | Refills: 0 | Status: SHIPPED | OUTPATIENT
Start: 2021-01-11 | End: 2021-05-13 | Stop reason: SDUPTHER

## 2021-01-11 ASSESSMENT — ENCOUNTER SYMPTOMS
SHORTNESS OF BREATH: 0
ABDOMINAL PAIN: 0
DIARRHEA: 0
NAUSEA: 0
CHEST TIGHTNESS: 0
VOMITING: 0
VISUAL CHANGE: 0
RHINORRHEA: 0
SORE THROAT: 0
COUGH: 1
WHEEZING: 0
CONSTIPATION: 0

## 2021-01-11 ASSESSMENT — COPD QUESTIONNAIRES: COPD: 1

## 2021-01-11 ASSESSMENT — PATIENT HEALTH QUESTIONNAIRE - PHQ9
SUM OF ALL RESPONSES TO PHQ QUESTIONS 1-9: 2
1. LITTLE INTEREST OR PLEASURE IN DOING THINGS: 1
2. FEELING DOWN, DEPRESSED OR HOPELESS: 1

## 2021-01-11 NOTE — PATIENT INSTRUCTIONS
SURVEY:    You may be receiving a survey from [employer] regarding your visit today. Please complete the survey to enable us to provide the highest quality of care to you and your family. If you cannot score us a very good on any question, please call the office to discuss how we could have made your experience a very good one. Thank you.

## 2021-01-11 NOTE — PROGRESS NOTES
Name: So Gray  : 1975         Chief Complaint:     Chief Complaint   Patient presents with   [address] Problem     routine check    Hyperlipidemia       History of Present Illness:      So Gray is a 50 y.o.  female who presents with Mental Health Problem (routine check) and Hyperlipidemia      Barb is here today for a routine office visit. Mental Health Problem  The primary symptoms include dysphoric mood (IMPROVED). The primary symptoms do not include delusions, hallucinations, bizarre behavior, disorganized speech, negative symptoms or somatic symptoms. The current episode started more than 1 month ago. This is a chronic problem. The onset of the illness is precipitated by emotional stress. The degree of incapacity that she is experiencing as a consequence of her illness is moderate. Sequelae of the illness include harmed interpersonal relations. Additional symptoms of the illness include agitation (Improved) and distractible. Additional symptoms of the illness do not include anhedonia, insomnia, hypersomnia, appetite change, unexpected weight change, fatigue, psychomotor retardation, feelings of worthlessness, attention impairment, euphoric mood, increased goal-directed activity, flight of ideas, inflated self-esteem, decreased need for sleep, poor judgment, visual change, headaches, abdominal pain or seizures. She does not admit to suicidal ideas. She does not have a plan to attempt suicide. She does not contemplate harming herself. She has not already injured self. She does not contemplate injuring another person. She has not already  injured another person. Risk factors that are present for mental illness include a history of mental illness and a family history of mental illness. Hyperlipidemia  This is a chronic problem. The current episode started more than 1 year ago. The problem is uncontrolled. Recent lipid tests were reviewed and are high.  She has no history of diabetes, hypothyroidism or nephrotic syndrome. Factors aggravating her hyperlipidemia include fatty foods and smoking. Pertinent negatives include no chest pain, leg pain, myalgias or shortness of breath. She is currently on no antihyperlipidemic treatment. The current treatment provides no improvement of lipids. Compliance problems include adherence to diet and adherence to exercise. Risk factors for coronary artery disease include stress, family history and dyslipidemia. Asthma  She complains of cough (intermittent). There is no chest tightness, shortness of breath or wheezing. This is a chronic problem. The current episode started more than 1 year ago. The problem occurs intermittently. The problem has been unchanged. Associated symptoms include dyspnea on exertion. Pertinent negatives include no appetite change, chest pain, fever, headaches, myalgias, rhinorrhea or sore throat. Her symptoms are aggravated by strenuous activity, URI, exposure to smoke, climbing stairs and change in weather. Her symptoms are alleviated by beta-agonist and rest. She reports complete improvement on treatment. Her symptoms are not alleviated by rest. Risk factors for lung disease include smoking/tobacco exposure. Her past medical history is significant for asthma, bronchitis and COPD. Past Medical History:     Past Medical History:   Diagnosis Date    Depression     Headache(784.0)     Hypotension     Vasodepressor syncope       Reviewed all health maintenance requirements and ordered appropriate tests  There are no preventive care reminders to display for this patient. Past Surgical History:     Past Surgical History:   Procedure Laterality Date    HYSTERECTOMY      TUBAL LIGATION          Medications:       Prior to Admission medications    Medication Sig Start Date End Date Taking? Authorizing Provider   clonazePAM (KLONOPIN) 1 MG tablet Take 1 tablet by mouth 2 times daily as needed for Anxiety for up to 30 days. 1/11/21 2/10/21 Yes KENDALL Reilly CNP   albuterol sulfate HFA (VENTOLIN HFA) 108 (90 Base) MCG/ACT inhaler Inhale 2 puffs into the lungs every 6 hours as needed for Wheezing 12/1/20  Yes KENDALL Reilly - CNP   ARIPiprazole (ABILIFY) 10 MG tablet Take 1 tablet by mouth daily 11/12/20  Yes KENDALL Reilly CNP   atorvastatin (LIPITOR) 20 MG tablet take 1 tablet by mouth once daily 9/23/20  Yes KENDALL Reilly - CNP   acetaminophen (TYLENOL) 500 MG tablet Take 500 mg by mouth every 6 hours as needed for Pain   Yes Historical Provider, MD   ibuprofen (ADVIL;MOTRIN) 800 MG tablet Take 1 tablet by mouth 3 times daily as needed for Pain 9/22/20 1/11/21 Yes KENDALL Wang CNP   DULoxetine (CYMBALTA) 30 MG extended release capsule Take 1 capsule by mouth daily 7/8/20  Yes KENDALL Reilly - CNP   DULoxetine (CYMBALTA) 60 MG extended release capsule Take 1 capsule by mouth daily 3/16/20  Yes KENDALL Reilly CNP        Allergies:       Codeine    Social History:     Tobacco:    reports that she has been smoking cigarettes. She has a 5.50 pack-year smoking history. She has never used smokeless tobacco.  Alcohol:      reports no history of alcohol use. Drug Use:  reports no history of drug use. Family History:     Family History   Problem Relation Age of Onset    Diabetes Mother     Thyroid Disease Mother     Arthritis Mother     Cancer Father     Stroke Father     Asthma Father     Emphysema Father     Seizures Sister        Review of Systems:     Positive and Negative as described in HPI    Review of Systems   Constitutional: Negative for appetite change, chills, fatigue, fever and unexpected weight change. HENT: Negative for congestion, rhinorrhea and sore throat. Eyes: Negative for visual disturbance. Respiratory: Positive for cough (intermittent). Negative for shortness of breath and wheezing. Cardiovascular: Positive for dyspnea on exertion.  Negative for chest pain and palpitations. Gastrointestinal: Negative for abdominal pain, constipation, diarrhea, nausea and vomiting. Genitourinary: Negative for difficulty urinating and dysuria. Musculoskeletal: Negative for gait problem, myalgias, neck pain and neck stiffness. Skin: Negative for rash. Neurological: Negative for dizziness, seizures, syncope, light-headedness and headaches. Psychiatric/Behavioral: Positive for agitation (Improved) and dysphoric mood (IMPROVED). Negative for behavioral problems, confusion, decreased concentration, hallucinations, self-injury, sleep disturbance and suicidal ideas. The patient is nervous/anxious. The patient does not have insomnia and is not hyperactive. Physical Exam:   Vitals:  /64 (Site: Left Upper Arm)   Pulse 80   Temp 98.7 °F (37.1 °C) (Temporal)   Resp 18   Ht 5' 5\" (1.651 m)   Wt 151 lb 1.6 oz (68.5 kg)   LMP 04/07/2008   SpO2 99%   BMI 25.14 kg/m²     Physical Exam  Constitutional:       General: She is not in acute distress. Appearance: Normal appearance. She is not ill-appearing. HENT:      Mouth/Throat:      Mouth: Mucous membranes are moist.   Eyes:      General: No scleral icterus. Conjunctiva/sclera: Conjunctivae normal.   Neck:      Musculoskeletal: Normal range of motion and neck supple. Vascular: No carotid bruit. Cardiovascular:      Rate and Rhythm: Normal rate and regular rhythm. Heart sounds: Normal heart sounds. No murmur. Pulmonary:      Effort: Pulmonary effort is normal. No respiratory distress. Breath sounds: Normal breath sounds. No wheezing. Abdominal:      General: Bowel sounds are normal. There is no distension. Palpations: Abdomen is soft. There is no mass. Tenderness: There is no abdominal tenderness. Lymphadenopathy:      Cervical: No cervical adenopathy. Skin:     General: Skin is warm and dry. Findings: No rash.    Neurological:      Mental Status: She is alert and oriented to person, place, and time. Psychiatric:         Attention and Perception: Attention normal.         Mood and Affect: Mood is anxious (mildly). Speech: Speech normal.         Behavior: Behavior normal. Behavior is cooperative. Thought Content: Thought content normal. Thought content does not include homicidal or suicidal ideation. Thought content does not include homicidal or suicidal plan. Cognition and Memory: Cognition normal.         Judgment: Judgment normal.         Data:     Lab Results   Component Value Date     12/31/2019    K 4.2 12/31/2019     12/31/2019    CO2 25 12/31/2019    BUN 8 12/31/2019    CREATININE 0.93 12/31/2019    GLUCOSE 90 12/31/2019    PROT 7.3 09/19/2017    LABALBU 4.2 09/19/2017    BILITOT 0.54 09/19/2017    ALKPHOS 101 09/19/2017    AST 18 07/08/2020    ALT 12 07/08/2020     Lab Results   Component Value Date    WBC 6.6 12/31/2019    RBC 4.08 12/31/2019    HGB 12.9 12/31/2019    HCT 40.4 12/31/2019    MCV 99.0 12/31/2019    MCH 31.6 12/31/2019    MCHC 31.9 12/31/2019    RDW 12.4 12/31/2019     12/31/2019    MPV 9.8 12/31/2019     Lab Results   Component Value Date    TSH 1.40 09/19/2017     Lab Results   Component Value Date    CHOL 222 07/08/2020    HDL 43 07/08/2020       Assessment/Plan:      Diagnosis Orders   1. Dysthymia  clonazePAM (KLONOPIN) 1 MG tablet   2. Dyslipidemia  CBC Auto Differential    ALT    AST    Basic Metabolic Panel    Lipid Panel   3. Moderate persistent asthma without complication         1. Barb received counseling on the following healthy behaviors: nutrition, exercise and medication adherence  2. Patient given educational materials - see patient instructions  3. Was a self-tracking handout given in paper form or via [employer]? No  If yes, see orders or list here. 4.  Discussed use, benefit, and side effects of prescribed medications. Barriers to medication compliance addressed.   All patient questions answered. Pt voiced understanding. 5.  Reviewed prior labs and health maintenance  6. Continue current medications, diet and exercise. Completed Refills   Requested Prescriptions     Signed Prescriptions Disp Refills    clonazePAM (KLONOPIN) 1 MG tablet 30 tablet 0     Sig: Take 1 tablet by mouth 2 times daily as needed for Anxiety for up to 30 days. Return in about 6 months (around 7/11/2021) for Check up.

## 2021-01-14 ENCOUNTER — HOSPITAL ENCOUNTER (OUTPATIENT)
Age: 46
Setting detail: SPECIMEN
Discharge: HOME | End: 2021-01-14
Payer: MEDICARE

## 2021-01-14 ENCOUNTER — OFFICE VISIT (OUTPATIENT)
Dept: OBGYN | Age: 46
End: 2021-01-14
Payer: MEDICARE

## 2021-01-14 VITALS
SYSTOLIC BLOOD PRESSURE: 101 MMHG | DIASTOLIC BLOOD PRESSURE: 80 MMHG | BODY MASS INDEX: 24.69 KG/M2 | WEIGHT: 148.2 LBS | HEIGHT: 65 IN

## 2021-01-14 DIAGNOSIS — Z01.419 WOMEN'S ANNUAL ROUTINE GYNECOLOGICAL EXAMINATION: ICD-10-CM

## 2021-01-14 DIAGNOSIS — A63.0 VAGINAL VENEREAL WARTS: Primary | ICD-10-CM

## 2021-01-14 DIAGNOSIS — B37.31 VAGINAL YEAST INFECTION: ICD-10-CM

## 2021-01-14 DIAGNOSIS — E89.40 PREMATURE SURGICAL MENOPAUSE: ICD-10-CM

## 2021-01-14 DIAGNOSIS — Z12.31 SCREENING MAMMOGRAM, ENCOUNTER FOR: ICD-10-CM

## 2021-01-14 DIAGNOSIS — A63.0 VAGINAL VENEREAL WARTS: ICD-10-CM

## 2021-01-14 PROCEDURE — 87070 CULTURE OTHR SPECIMN AEROBIC: CPT

## 2021-01-14 PROCEDURE — 87491 CHLMYD TRACH DNA AMP PROBE: CPT

## 2021-01-14 PROCEDURE — 87591 N.GONORRHOEAE DNA AMP PROB: CPT

## 2021-01-14 PROCEDURE — G8482 FLU IMMUNIZE ORDER/ADMIN: HCPCS | Performed by: OBSTETRICS & GYNECOLOGY

## 2021-01-14 PROCEDURE — 99386 PREV VISIT NEW AGE 40-64: CPT | Performed by: OBSTETRICS & GYNECOLOGY

## 2021-01-14 PROCEDURE — G0145 SCR C/V CYTO,THINLAYER,RESCR: HCPCS

## 2021-01-14 RX ORDER — FLUCONAZOLE 150 MG/1
150 TABLET ORAL ONCE
Qty: 1 TABLET | Refills: 0 | Status: SHIPPED | OUTPATIENT
Start: 2021-01-14 | End: 2021-01-14

## 2021-01-14 NOTE — PROGRESS NOTES
PROBLEM VISIT     Date of service: 2021    Asher Muniz  Is a 50 y.o.  female    PT's PCP is: KENDALL Nicole - [facility]     : 1975                                             Subjective:       Patient's last menstrual period was 2008. OB History    Para Term  AB Living   3 3 3     3   SAB TAB Ectopic Molar Multiple Live Births             3      # Outcome Date GA Lbr Andres/2nd Weight Sex Delivery Anes PTL Lv   3 Term 99 40w0d   F Vag-Spont   LUANN   2 Term 11/15/95 40w0d   M Vag-Spont   LUANN   1 Term 93 40w0d   M Vag-Spont   LUANN        Social History     Tobacco Use   Smoking Status Current Every Day Smoker    Packs/day: 0.50    Years: 11.00    Pack years: 5.50    Types: Cigarettes   Smokeless Tobacco Never Used        Social History     Substance and Sexual Activity   Alcohol Use No       Allergies: Codeine      Current Outpatient Medications:     fluconazole (DIFLUCAN) 150 MG tablet, Take 1 tablet by mouth once for 1 dose, Disp: 1 tablet, Rfl: 0    clonazePAM (KLONOPIN) 1 MG tablet, Take 1 tablet by mouth 2 times daily as needed for Anxiety for up to 30 days. , Disp: 30 tablet, Rfl: 0    albuterol sulfate HFA (VENTOLIN HFA) 108 (90 Base) MCG/ACT inhaler, Inhale 2 puffs into the lungs every 6 hours as needed for Wheezing, Disp: 1 Inhaler, Rfl: 3    ARIPiprazole (ABILIFY) 10 MG tablet, Take 1 tablet by mouth daily, Disp: 90 tablet, Rfl: 1    atorvastatin (LIPITOR) 20 MG tablet, take 1 tablet by mouth once daily, Disp: 90 tablet, Rfl: 1    acetaminophen (TYLENOL) 500 MG tablet, Take 500 mg by mouth every 6 hours as needed for Pain, Disp: , Rfl:     DULoxetine (CYMBALTA) 30 MG extended release capsule, Take 1 capsule by mouth daily, Disp: 90 capsule, Rfl: 1    DULoxetine (CYMBALTA) 60 MG extended release capsule, Take 1 capsule by mouth daily, Disp: 90 capsule, Rfl: 3    ibuprofen (ADVIL;MOTRIN) 800 MG tablet, Take 1 tablet by mouth 3 times daily as needed for Pain, Disp: 30 tablet, Rfl: 0    Social History     Substance and Sexual Activity   Sexual Activity Yes    Partners: Male    Comment: pt has had a hysterectomy       Last Yearly:  3/31/16    Last pap: 3/31/16    Last HPV: ?    Chief Complaint   Patient presents with   [facility] Patient     new patient - STI check. pt c.o genital warts. pt states it hurts and sometimes bleeds. PE:  Vital Signs  Blood pressure 101/80, height 5' 5\" (1.651 m), weight 148 lb 3.2 oz (67.2 kg), last menstrual period 04/07/2008. Estimated body mass index is 24.66 kg/m² as calculated from the following:    Height as of this encounter: 5' 5\" (1.651 m). Weight as of this encounter: 148 lb 3.2 oz (67.2 kg). NURSE: AKIN    HPI: The patient is here as she suspects she has warts she also wishes to be screened for STDs. Of significance that she had premature surgical menopause and has discontinued her hormone replacement therapy for many years. I am afraid this places her at increased risk for osteoporosis    No PT denies fever, chills, nausea and vomiting       Objective  Lymphatic:   no lymphadenopathy  Heent:   negative   Cor: regular rate and rhythm, no murmurs              Pul:clear to auscultation bilaterally- no wheezes, rales or rhonchi, normal air movement, no respiratory distress      GI: Abdomen soft, non-tender. BS normal. No masses,  No organomegaly           Extremities: normal strength, tone, and muscle mass   Breasts: Breast:normal appearance, no masses or tenderness, Inspection negative, No nipple retraction or dimpling, No nipple discharge or bleeding, No axillary or supraclavicular adenopathy, Normal to palpation without dominant masses   Pelvic Exam: GENITAL/URINARY:  External Genitalia: Numerous scattered warts over the vulva periclitoral area and perianal area noted  Vagina:  Abnormal findings: Extensive vaginal condyloma on both portions of the vaginal sidewall.   Cervix: Absent, no condyloma noted at the cuff  Uterus:  Hystory of hysterectomy  Adenexa:  Hystory of ovary removal                                    Vaginal discharge: culture taken, for white vaginal discharge consistent with yeast.                       Results reviewed today:    No results found for this visit on 01/14/21. Assessment/plan: We will put a rush on the Pap smear. I am concerned over the extensive degree of condyloma if there could be any dysplasia in the vagina.   We will also screen for other STDs per patient request

## 2021-01-15 DIAGNOSIS — A63.0 VAGINAL VENEREAL WARTS: ICD-10-CM

## 2021-01-18 LAB
CHLAMYDIA BY THIN PREP: NEGATIVE
CULTURE: ABNORMAL
Lab: ABNORMAL
N. GONORRHOEAE DNA, THIN PREP: NEGATIVE
SPECIMEN DESCRIPTION: ABNORMAL

## 2021-01-18 RX ORDER — CLINDAMYCIN PHOSPHATE 20 MG/G
CREAM VAGINAL
Qty: 40 G | Refills: 0 | Status: ON HOLD | OUTPATIENT
Start: 2021-01-18 | End: 2021-03-17

## 2021-01-22 ENCOUNTER — OFFICE VISIT (OUTPATIENT)
Dept: OBGYN | Age: 46
End: 2021-01-22
Payer: MEDICARE

## 2021-01-22 VITALS
WEIGHT: 148 LBS | DIASTOLIC BLOOD PRESSURE: 70 MMHG | SYSTOLIC BLOOD PRESSURE: 102 MMHG | HEIGHT: 65 IN | BODY MASS INDEX: 24.66 KG/M2

## 2021-01-22 DIAGNOSIS — A63.0 CONDYLOMA OF FEMALE GENITALIA: Primary | ICD-10-CM

## 2021-01-22 PROCEDURE — G8420 CALC BMI NORM PARAMETERS: HCPCS | Performed by: OBSTETRICS & GYNECOLOGY

## 2021-01-22 PROCEDURE — G8427 DOCREV CUR MEDS BY ELIG CLIN: HCPCS | Performed by: OBSTETRICS & GYNECOLOGY

## 2021-01-22 PROCEDURE — G8482 FLU IMMUNIZE ORDER/ADMIN: HCPCS | Performed by: OBSTETRICS & GYNECOLOGY

## 2021-01-22 PROCEDURE — 4004F PT TOBACCO SCREEN RCVD TLK: CPT | Performed by: OBSTETRICS & GYNECOLOGY

## 2021-01-22 PROCEDURE — 99212 OFFICE O/P EST SF 10 MIN: CPT | Performed by: OBSTETRICS & GYNECOLOGY

## 2021-01-22 NOTE — PROGRESS NOTES
PROBLEM VISIT     Date of service: 2021    Asher Muniz  Is a 50 y.o.  female    PT's PCP is: KENDALL Nicole - [place]     : 1975                                             Subjective:       Patient's last menstrual period was 2008. OB History    Para Term  AB Living   3 3 3     3   SAB TAB Ectopic Molar Multiple Live Births             3      # Outcome Date GA Lbr Andres/2nd Weight Sex Delivery Anes PTL Lv   3 Term 99 40w0d   F Vag-Spont   LUANN   2 Term 11/15/95 40w0d   M Vag-Spont   LUANN   1 Term 93 40w0d   M Vag-Spont   LUANN        Social History     Tobacco Use   Smoking Status Current Every Day Smoker    Packs/day: 0.50    Years: 11.00    Pack years: 5.50    Types: Cigarettes   Smokeless Tobacco Never Used        Social History     Substance and Sexual Activity   Alcohol Use No       Social History     Substance and Sexual Activity   Sexual Activity Yes    Partners: Male    Comment: pt has had a hysterectomy       Allergies: Codeine    Chief Complaint   Patient presents with    Follow-up     pt presents for a follow up for results       Last Yearly:  21    Last pap: 21    Last HPV: never      NURSE: edwin    PE:  Vital Signs  Blood pressure 102/70, height 5' 5\" (1.651 m), weight 148 lb (67.1 kg), last menstrual period 2008. Labs:    No results found for this visit on 21. NURSE: martín    HPI: The patient is here today to discuss laser surgery. She has external condyloma and perianal condyloma and extremely extensive vaginal condyloma    No  PT denies fever, chills, nausea and vomiting       Objective: See above and past exam regarding the condyloma. Pap smear is not yet available                           Assessment and Plan: I did review surgical risk of CO2 laser vaporization with blood loss infection and scarring.   Patient does acknowledge these risk and does understand there will be some postoperative pain as well Diagnosis Orders   1. 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 FF: 10 minutes    There are no Patient Instructions on file for this visit. Over 75%of time spent on counseling and care coordination on: see assessment and plan,  She was also counseled on her preventative health maintenance recommendations and follow-up.       Bucky Vázquez,1/22/2021 10:18 AM

## 2021-01-26 LAB — CYTOLOGY REPORT: NORMAL

## 2021-02-09 ENCOUNTER — OFFICE VISIT (OUTPATIENT)
Dept: PRIMARY CARE CLINIC | Age: 46
End: 2021-02-09
Payer: MEDICARE

## 2021-02-09 VITALS
BODY MASS INDEX: 25.46 KG/M2 | OXYGEN SATURATION: 98 % | DIASTOLIC BLOOD PRESSURE: 72 MMHG | TEMPERATURE: 98 F | WEIGHT: 153 LBS | HEART RATE: 79 BPM | SYSTOLIC BLOOD PRESSURE: 110 MMHG | RESPIRATION RATE: 18 BRPM

## 2021-02-09 DIAGNOSIS — F43.9 STRESS: ICD-10-CM

## 2021-02-09 DIAGNOSIS — L65.9 HAIR LOSS: Primary | ICD-10-CM

## 2021-02-09 PROCEDURE — G8482 FLU IMMUNIZE ORDER/ADMIN: HCPCS | Performed by: NURSE PRACTITIONER

## 2021-02-09 PROCEDURE — G8427 DOCREV CUR MEDS BY ELIG CLIN: HCPCS | Performed by: NURSE PRACTITIONER

## 2021-02-09 PROCEDURE — 99214 OFFICE O/P EST MOD 30 MIN: CPT | Performed by: NURSE PRACTITIONER

## 2021-02-09 PROCEDURE — 4004F PT TOBACCO SCREEN RCVD TLK: CPT | Performed by: NURSE PRACTITIONER

## 2021-02-09 PROCEDURE — G8419 CALC BMI OUT NRM PARAM NOF/U: HCPCS | Performed by: NURSE PRACTITIONER

## 2021-02-09 ASSESSMENT — ENCOUNTER SYMPTOMS
RHINORRHEA: 0
ABDOMINAL PAIN: 0
NAUSEA: 0
CONSTIPATION: 0
SHORTNESS OF BREATH: 0
VOMITING: 0
COUGH: 0
WHEEZING: 0
DIARRHEA: 0
SORE THROAT: 0

## 2021-02-09 NOTE — PATIENT INSTRUCTIONS
SURVEY:    You may be receiving a survey from [employer] regarding your visit today. Please complete the survey to enable us to provide the highest quality of care to you and your family. If you cannot score us a very good on any question, please call the office to discuss how we could have made your experience a very good one. Thank you.

## 2021-02-09 NOTE — PROGRESS NOTES
Name: Rosana Hernandez  : 1975         Chief Complaint:     Chief Complaint   Patient presents with    Hair/Scalp Problem     X 1 day, hair falling up       History of Present Illness:      Rosana Hernandez is a 50 y.o.  female who presents with Hair/Scalp Problem (X 1 day, hair falling up)      Barb is here today for a routine office visit. Hair losspatient states she noticed the large amount of hair loss yesterday while washing her hair. She states this was unusual for her. See below for further comments. Stress test patient does admit to increased stress. States she feels this may be related to her hair loss. She is not pulling her hair. She is compliant with her medications. Other  This is a new (HAIR LOSS) problem. The current episode started yesterday. The problem occurs rarely. The problem has been unchanged. Pertinent negatives include no abdominal pain, chest pain, chills, congestion, coughing, fatigue, fever, headaches, nausea, neck pain, rash, sore throat or vomiting. The symptoms are aggravated by stress. She has tried nothing for the symptoms. The treatment provided no relief. Past Medical History:     Past Medical History:   Diagnosis Date    Collapsed lung     Depression     Headache(784.0)     Hypotension     Vasodepressor syncope       Reviewed all health maintenance requirements and ordered appropriate tests  There are no preventive care reminders to display for this patient. Past Surgical History:     Past Surgical History:   Procedure Laterality Date    HYSTERECTOMY      TUBAL LIGATION          Medications:       Prior to Admission medications    Medication Sig Start Date End Date Taking? Authorizing Provider   clonazePAM (KLONOPIN) 1 MG tablet Take 1 tablet by mouth 2 times daily as needed for Anxiety for up to 30 days.  1/11/21 2/10/21 Yes [facility], [facility] albuterol sulfate HFA (VENTOLIN HFA) 108 (90 Base) MCG/ACT inhaler Inhale 2 puffs into the lungs every 6 hours as needed for Wheezing 12/1/20  Yes KENDALL Gavin CNP   ARIPiprazole (ABILIFY) 10 MG tablet Take 1 tablet by mouth daily 11/12/20  Yes KENDALL Gavin - CNP   atorvastatin (LIPITOR) 20 MG tablet take 1 tablet by mouth once daily 9/23/20  Yes KENDALL Gavin - CNP   acetaminophen (TYLENOL) 500 MG tablet Take 500 mg by mouth every 6 hours as needed for Pain   Yes Historical Provider, MD   ibuprofen (ADVIL;MOTRIN) 800 MG tablet Take 1 tablet by mouth 3 times daily as needed for Pain 9/22/20 2/9/21 Yes KENDALL Wang CNP   DULoxetine (CYMBALTA) 30 MG extended release capsule Take 1 capsule by mouth daily 7/8/20  Yes KENDALL Gavin CNP   DULoxetine (CYMBALTA) 60 MG extended release capsule Take 1 capsule by mouth daily 3/16/20  Yes Luz Sanchez APRN - CNP   clindamycin (CLEOCIN) 2 % vaginal cream Place vaginally nightly. For 7 nights  Patient not taking: Reported on 2/9/2021 1/18/21   Mo Howard MD        Allergies:       Codeine    Social History:     Tobacco:    reports that she has been smoking cigarettes. She has a 5.50 pack-year smoking history. She has never used smokeless tobacco.  Alcohol:      reports no history of alcohol use. Drug Use:  reports no history of drug use.     Family History:     Family History   Problem Relation Age of Onset    Diabetes Mother     Thyroid Disease Mother     Arthritis Mother     Cancer Father     Stroke Father     Asthma Father     Emphysema Father     Seizures Sister     Diabetes Sister     No Known Problems Maternal Grandmother     No Known Problems Maternal Grandfather     No Known Problems Paternal Grandmother     No Known Problems Paternal Grandfather     Other Other         no family h/o breast or ovarian cancer        Review of Systems:     Positive and Negative as described in HPI    Review of Systems Right lower leg: No edema. Left lower leg: No edema. Skin:     General: Skin is warm and dry. Findings: No erythema or rash. Neurological:      Mental Status: She is alert and oriented to person, place, and time. Psychiatric:         Attention and Perception: Attention normal.         Mood and Affect: Affect normal. Mood is anxious (mildly). Speech: Speech normal.         Behavior: Behavior normal. Behavior is cooperative. Thought Content: Thought content normal.         Cognition and Memory: Cognition normal.         Judgment: Judgment normal.         Data:     Lab Results   Component Value Date     12/31/2019    K 4.2 12/31/2019     12/31/2019    CO2 25 12/31/2019    BUN 8 12/31/2019    CREATININE 0.93 12/31/2019    GLUCOSE 90 12/31/2019    PROT 7.3 09/19/2017    LABALBU 4.2 09/19/2017    BILITOT 0.54 09/19/2017    ALKPHOS 101 09/19/2017    AST 18 07/08/2020    ALT 12 07/08/2020     Lab Results   Component Value Date    WBC 6.6 12/31/2019    RBC 4.08 12/31/2019    HGB 12.9 12/31/2019    HCT 40.4 12/31/2019    MCV 99.0 12/31/2019    MCH 31.6 12/31/2019    MCHC 31.9 12/31/2019    RDW 12.4 12/31/2019     12/31/2019    MPV 9.8 12/31/2019     Lab Results   Component Value Date    TSH 1.40 09/19/2017     Lab Results   Component Value Date    CHOL 222 07/08/2020    HDL 43 07/08/2020       Assessment/Plan:      Diagnosis Orders   1. Hair loss  TSH without Reflex    T4, Free    CBC Auto Differential    Iron and TIBC    Vitamin B12    Folate   2. Stress       I do not find any obvious signs of hair loss on today's exam.  We will continue current medications. Labs as ordered. We will probably have her follow with dermatology if not improving or any worsening. 1.  Barb received counseling on the following healthy behaviors: nutrition, exercise and medication adherence  2.   Patient given educational materials - see patient instructions 3.  Was a self-tracking handout given in paper form or via [employer]? No  If yes, see orders or list here. 4.  Discussed use, benefit, and side effects of prescribed medications. Barriers to medication compliance addressed. All patient questions answered. Pt voiced understanding. 5.  Reviewed prior labs and health maintenance  6. Continue current medications, diet and exercise. Completed Refills   Requested Prescriptions      No prescriptions requested or ordered in this encounter         No follow-ups on file.

## 2021-02-10 ENCOUNTER — HOSPITAL ENCOUNTER (OUTPATIENT)
Age: 46
Discharge: HOME | End: 2021-02-10
Payer: MEDICARE

## 2021-02-10 DIAGNOSIS — L65.9 HAIR LOSS: ICD-10-CM

## 2021-02-10 LAB
ABSOLUTE EOS #: 0.15 K/UL (ref 0–0.44)
ABSOLUTE IMMATURE GRANULOCYTE: 0.03 K/UL (ref 0–0.3)
ABSOLUTE LYMPH #: 1.83 K/UL (ref 1.1–3.7)
ABSOLUTE MONO #: 0.65 K/UL (ref 0.1–1.2)
BASOPHILS # BLD: 1 % (ref 0–2)
BASOPHILS ABSOLUTE: 0.08 K/UL (ref 0–0.2)
DIFFERENTIAL TYPE: ABNORMAL
EOSINOPHILS RELATIVE PERCENT: 2 % (ref 1–4)
FOLATE: 6.5 NG/ML
HCT VFR BLD CALC: 42.2 % (ref 36.3–47.1)
HEMOGLOBIN: 13.7 G/DL (ref 11.9–15.1)
IMMATURE GRANULOCYTES: 0 %
IRON SATURATION: 33 % (ref 20–55)
IRON: 91 UG/DL (ref 37–145)
LYMPHOCYTES # BLD: 20 % (ref 24–43)
MCH RBC QN AUTO: 32.1 PG (ref 25.2–33.5)
MCHC RBC AUTO-ENTMCNC: 32.5 G/DL (ref 28.4–34.8)
MCV RBC AUTO: 98.8 FL (ref 82.6–102.9)
MONOCYTES # BLD: 7 % (ref 3–12)
NRBC AUTOMATED: 0 PER 100 WBC
PDW BLD-RTO: 12.6 % (ref 11.8–14.4)
PLATELET # BLD: 269 K/UL (ref 138–453)
PLATELET ESTIMATE: ABNORMAL
PMV BLD AUTO: 9.7 FL (ref 8.1–13.5)
RBC # BLD: 4.27 M/UL (ref 3.95–5.11)
RBC # BLD: ABNORMAL 10*6/UL
SEG NEUTROPHILS: 70 % (ref 36–65)
SEGMENTED NEUTROPHILS ABSOLUTE COUNT: 6.47 K/UL (ref 1.5–8.1)
THYROXINE, FREE: 1.42 NG/DL (ref 0.93–1.7)
TOTAL IRON BINDING CAPACITY: 277 UG/DL (ref 250–450)
TSH SERPL DL<=0.05 MIU/L-ACNC: 2.94 MIU/L (ref 0.3–5)
UNSATURATED IRON BINDING CAPACITY: 186 UG/DL (ref 112–347)
VITAMIN B-12: 393 PG/ML (ref 232–1245)
WBC # BLD: 9.2 K/UL (ref 3.5–11.3)
WBC # BLD: ABNORMAL 10*3/UL

## 2021-02-10 PROCEDURE — 84443 ASSAY THYROID STIM HORMONE: CPT

## 2021-02-10 PROCEDURE — 82746 ASSAY OF FOLIC ACID SERUM: CPT

## 2021-02-10 PROCEDURE — 84439 ASSAY OF FREE THYROXINE: CPT

## 2021-02-10 PROCEDURE — 85025 COMPLETE CBC W/AUTO DIFF WBC: CPT

## 2021-02-10 PROCEDURE — 82607 VITAMIN B-12: CPT

## 2021-02-10 PROCEDURE — 36415 COLL VENOUS BLD VENIPUNCTURE: CPT

## 2021-02-10 PROCEDURE — 83550 IRON BINDING TEST: CPT

## 2021-02-10 PROCEDURE — 83540 ASSAY OF IRON: CPT

## 2021-02-11 ENCOUNTER — TELEPHONE (OUTPATIENT)
Dept: PRIMARY CARE CLINIC | Age: 46
End: 2021-02-11

## 2021-03-02 ENCOUNTER — HOSPITAL ENCOUNTER (OUTPATIENT)
Dept: WOMENS IMAGING | Age: 46
Discharge: HOME | End: 2021-03-04
Payer: MEDICARE

## 2021-03-02 DIAGNOSIS — E89.40 PREMATURE SURGICAL MENOPAUSE: ICD-10-CM

## 2021-03-02 DIAGNOSIS — E89.40 PREMATURE SURGICAL MENOPAUSE: Primary | ICD-10-CM

## 2021-03-02 DIAGNOSIS — Z12.31 SCREENING MAMMOGRAM, ENCOUNTER FOR: ICD-10-CM

## 2021-03-02 PROCEDURE — 77080 DXA BONE DENSITY AXIAL: CPT

## 2021-03-02 PROCEDURE — 77063 BREAST TOMOSYNTHESIS BI: CPT

## 2021-03-03 NOTE — PROGRESS NOTES
Patient instructed on the pre-operative, intra-operative, and post-operative process. Patient instructed on NPO status. Medication instructions and Pre operative instruction sheet reviewed over the phone. Instructed pt to avoid ibuprofen 7 days prior to surgery and to take klonopin, cymbalta, and abilify with a small sip of water prior to arriving to the hospital the day of surgery.

## 2021-03-10 ENCOUNTER — HOSPITAL ENCOUNTER (OUTPATIENT)
Dept: PREADMISSION TESTING | Age: 46
Setting detail: SPECIMEN
Discharge: HOME | End: 2021-03-14
Payer: MEDICARE

## 2021-03-10 DIAGNOSIS — Z01.818 PREOPERATIVE TESTING: Primary | ICD-10-CM

## 2021-03-10 DIAGNOSIS — Z01.818 PREOPERATIVE TESTING: ICD-10-CM

## 2021-03-10 PROCEDURE — U0003 INFECTIOUS AGENT DETECTION BY NUCLEIC ACID (DNA OR RNA); SEVERE ACUTE RESPIRATORY SYNDROME CORONAVIRUS 2 (SARS-COV-2) (CORONAVIRUS DISEASE [COVID-19]), AMPLIFIED PROBE TECHNIQUE, MAKING USE OF HIGH THROUGHPUT TECHNOLOGIES AS DESCRIBED BY CMS-2020-01-R: HCPCS

## 2021-03-10 PROCEDURE — C9803 HOPD COVID-19 SPEC COLLECT: HCPCS

## 2021-03-10 PROCEDURE — U0005 INFEC AGEN DETEC AMPLI PROBE: HCPCS

## 2021-03-11 LAB
SARS-COV-2: NORMAL
SARS-COV-2: NOT DETECTED
SOURCE: NORMAL

## 2021-03-16 ENCOUNTER — ANESTHESIA EVENT (OUTPATIENT)
Dept: OPERATING ROOM | Age: 46
End: 2021-03-16
Payer: MEDICARE

## 2021-03-17 ENCOUNTER — HOSPITAL ENCOUNTER (OUTPATIENT)
Age: 46
Setting detail: OUTPATIENT SURGERY
Discharge: HOME | End: 2021-03-17
Attending: OBSTETRICS & GYNECOLOGY | Admitting: OBSTETRICS & GYNECOLOGY
Payer: MEDICARE

## 2021-03-17 ENCOUNTER — ANESTHESIA (OUTPATIENT)
Dept: OPERATING ROOM | Age: 46
End: 2021-03-17
Payer: MEDICARE

## 2021-03-17 VITALS
OXYGEN SATURATION: 99 % | BODY MASS INDEX: 24.16 KG/M2 | SYSTOLIC BLOOD PRESSURE: 104 MMHG | HEIGHT: 65 IN | DIASTOLIC BLOOD PRESSURE: 56 MMHG | TEMPERATURE: 97.6 F | WEIGHT: 145 LBS | RESPIRATION RATE: 14 BRPM | HEART RATE: 68 BPM

## 2021-03-17 VITALS — DIASTOLIC BLOOD PRESSURE: 64 MMHG | SYSTOLIC BLOOD PRESSURE: 104 MMHG | OXYGEN SATURATION: 100 %

## 2021-03-17 DIAGNOSIS — A63.0 CONDYLOMATA ACUMINATA IN FEMALE: Primary | ICD-10-CM

## 2021-03-17 PROCEDURE — 3700000000 HC ANESTHESIA ATTENDED CARE: Performed by: OBSTETRICS & GYNECOLOGY

## 2021-03-17 PROCEDURE — 6360000002 HC RX W HCPCS: Performed by: NURSE ANESTHETIST, CERTIFIED REGISTERED

## 2021-03-17 PROCEDURE — 3700000001 HC ADD 15 MINUTES (ANESTHESIA): Performed by: OBSTETRICS & GYNECOLOGY

## 2021-03-17 PROCEDURE — 2500000003 HC RX 250 WO HCPCS: Performed by: NURSE ANESTHETIST, CERTIFIED REGISTERED

## 2021-03-17 PROCEDURE — 6360000002 HC RX W HCPCS: Performed by: OBSTETRICS & GYNECOLOGY

## 2021-03-17 PROCEDURE — 2709999900 HC NON-CHARGEABLE SUPPLY: Performed by: OBSTETRICS & GYNECOLOGY

## 2021-03-17 PROCEDURE — 7100000011 HC PHASE II RECOVERY - ADDTL 15 MIN: Performed by: OBSTETRICS & GYNECOLOGY

## 2021-03-17 PROCEDURE — 6370000000 HC RX 637 (ALT 250 FOR IP): Performed by: OBSTETRICS & GYNECOLOGY

## 2021-03-17 PROCEDURE — 7100000001 HC PACU RECOVERY - ADDTL 15 MIN: Performed by: OBSTETRICS & GYNECOLOGY

## 2021-03-17 PROCEDURE — 3600000013 HC SURGERY LEVEL 3 ADDTL 15MIN: Performed by: OBSTETRICS & GYNECOLOGY

## 2021-03-17 PROCEDURE — 7100000000 HC PACU RECOVERY - FIRST 15 MIN: Performed by: OBSTETRICS & GYNECOLOGY

## 2021-03-17 PROCEDURE — 2580000003 HC RX 258: Performed by: OBSTETRICS & GYNECOLOGY

## 2021-03-17 PROCEDURE — 3600000003 HC SURGERY LEVEL 3 BASE: Performed by: OBSTETRICS & GYNECOLOGY

## 2021-03-17 PROCEDURE — 7100000010 HC PHASE II RECOVERY - FIRST 15 MIN: Performed by: OBSTETRICS & GYNECOLOGY

## 2021-03-17 PROCEDURE — 56515 DESTROY VULVA LESION/S COMPL: CPT | Performed by: OBSTETRICS & GYNECOLOGY

## 2021-03-17 RX ORDER — EPHEDRINE SULFATE/0.9% NACL/PF 50 MG/5 ML
SYRINGE (ML) INTRAVENOUS PRN
Status: DISCONTINUED | OUTPATIENT
Start: 2021-03-17 | End: 2021-03-17 | Stop reason: SDUPTHER

## 2021-03-17 RX ORDER — PROPOFOL 10 MG/ML
INJECTION, EMULSION INTRAVENOUS PRN
Status: DISCONTINUED | OUTPATIENT
Start: 2021-03-17 | End: 2021-03-17 | Stop reason: SDUPTHER

## 2021-03-17 RX ORDER — PROMETHAZINE HYDROCHLORIDE 25 MG/ML
6.25 INJECTION, SOLUTION INTRAMUSCULAR; INTRAVENOUS
Status: DISCONTINUED | OUTPATIENT
Start: 2021-03-17 | End: 2021-03-17 | Stop reason: HOSPADM

## 2021-03-17 RX ORDER — SODIUM CHLORIDE, SODIUM LACTATE, POTASSIUM CHLORIDE, CALCIUM CHLORIDE 600; 310; 30; 20 MG/100ML; MG/100ML; MG/100ML; MG/100ML
INJECTION, SOLUTION INTRAVENOUS CONTINUOUS
Status: DISCONTINUED | OUTPATIENT
Start: 2021-03-17 | End: 2021-03-17 | Stop reason: HOSPADM

## 2021-03-17 RX ORDER — HYDROCODONE BITARTRATE AND ACETAMINOPHEN 5; 325 MG/1; MG/1
1 TABLET ORAL EVERY 4 HOURS PRN
Status: DISCONTINUED | OUTPATIENT
Start: 2021-03-17 | End: 2021-03-17 | Stop reason: HOSPADM

## 2021-03-17 RX ORDER — ONDANSETRON 2 MG/ML
INJECTION INTRAMUSCULAR; INTRAVENOUS PRN
Status: DISCONTINUED | OUTPATIENT
Start: 2021-03-17 | End: 2021-03-17 | Stop reason: SDUPTHER

## 2021-03-17 RX ORDER — DEXAMETHASONE SODIUM PHOSPHATE 4 MG/ML
INJECTION, SOLUTION INTRA-ARTICULAR; INTRALESIONAL; INTRAMUSCULAR; INTRAVENOUS; SOFT TISSUE PRN
Status: DISCONTINUED | OUTPATIENT
Start: 2021-03-17 | End: 2021-03-17 | Stop reason: SDUPTHER

## 2021-03-17 RX ORDER — CEFAZOLIN SODIUM 2 G/50ML
2000 SOLUTION INTRAVENOUS
Status: COMPLETED | OUTPATIENT
Start: 2021-03-17 | End: 2021-03-17

## 2021-03-17 RX ORDER — SODIUM CHLORIDE 0.9 % (FLUSH) 0.9 %
10 SYRINGE (ML) INJECTION EVERY 12 HOURS SCHEDULED
Status: DISCONTINUED | OUTPATIENT
Start: 2021-03-17 | End: 2021-03-17 | Stop reason: HOSPADM

## 2021-03-17 RX ORDER — HYDROCODONE BITARTRATE AND ACETAMINOPHEN 5; 325 MG/1; MG/1
1 TABLET ORAL EVERY 6 HOURS PRN
Qty: 20 TABLET | Refills: 0 | Status: SHIPPED | OUTPATIENT
Start: 2021-03-17 | End: 2021-03-22

## 2021-03-17 RX ORDER — DIMENHYDRINATE 50 MG/1
50 TABLET ORAL ONCE
Status: COMPLETED | OUTPATIENT
Start: 2021-03-17 | End: 2021-03-17

## 2021-03-17 RX ORDER — FENTANYL CITRATE 50 UG/ML
50 INJECTION, SOLUTION INTRAMUSCULAR; INTRAVENOUS EVERY 5 MIN PRN
Status: DISCONTINUED | OUTPATIENT
Start: 2021-03-17 | End: 2021-03-17 | Stop reason: HOSPADM

## 2021-03-17 RX ORDER — OXYCODONE HYDROCHLORIDE AND ACETAMINOPHEN 5; 325 MG/1; MG/1
1 TABLET ORAL
Status: DISCONTINUED | OUTPATIENT
Start: 2021-03-17 | End: 2021-03-17 | Stop reason: HOSPADM

## 2021-03-17 RX ORDER — ACETAMINOPHEN 325 MG/1
650 TABLET ORAL EVERY 4 HOURS PRN
Status: DISCONTINUED | OUTPATIENT
Start: 2021-03-17 | End: 2021-03-17 | Stop reason: HOSPADM

## 2021-03-17 RX ORDER — PROMETHAZINE HYDROCHLORIDE 25 MG/1
12.5 TABLET ORAL EVERY 6 HOURS PRN
Status: DISCONTINUED | OUTPATIENT
Start: 2021-03-17 | End: 2021-03-17 | Stop reason: HOSPADM

## 2021-03-17 RX ORDER — SODIUM CHLORIDE 0.9 % (FLUSH) 0.9 %
10 SYRINGE (ML) INJECTION PRN
Status: DISCONTINUED | OUTPATIENT
Start: 2021-03-17 | End: 2021-03-17 | Stop reason: HOSPADM

## 2021-03-17 RX ORDER — ACETAMINOPHEN 325 MG/1
650 TABLET ORAL ONCE
Status: COMPLETED | OUTPATIENT
Start: 2021-03-17 | End: 2021-03-17

## 2021-03-17 RX ORDER — KETOROLAC TROMETHAMINE 30 MG/ML
INJECTION, SOLUTION INTRAMUSCULAR; INTRAVENOUS PRN
Status: DISCONTINUED | OUTPATIENT
Start: 2021-03-17 | End: 2021-03-17 | Stop reason: SDUPTHER

## 2021-03-17 RX ORDER — HYDROCODONE BITARTRATE AND ACETAMINOPHEN 5; 325 MG/1; MG/1
2 TABLET ORAL EVERY 4 HOURS PRN
Status: DISCONTINUED | OUTPATIENT
Start: 2021-03-17 | End: 2021-03-17 | Stop reason: HOSPADM

## 2021-03-17 RX ORDER — ONDANSETRON 2 MG/ML
4 INJECTION INTRAMUSCULAR; INTRAVENOUS EVERY 6 HOURS PRN
Status: DISCONTINUED | OUTPATIENT
Start: 2021-03-17 | End: 2021-03-17 | Stop reason: HOSPADM

## 2021-03-17 RX ORDER — LIDOCAINE 50 MG/G
OINTMENT TOPICAL
Qty: 1 TUBE | Refills: 1 | Status: SHIPPED | OUTPATIENT
Start: 2021-03-17 | End: 2022-02-08

## 2021-03-17 RX ORDER — LIDOCAINE HYDROCHLORIDE 20 MG/ML
INJECTION, SOLUTION EPIDURAL; INFILTRATION; INTRACAUDAL; PERINEURAL PRN
Status: DISCONTINUED | OUTPATIENT
Start: 2021-03-17 | End: 2021-03-17 | Stop reason: SDUPTHER

## 2021-03-17 RX ORDER — METOCLOPRAMIDE HYDROCHLORIDE 5 MG/ML
10 INJECTION INTRAMUSCULAR; INTRAVENOUS
Status: DISCONTINUED | OUTPATIENT
Start: 2021-03-17 | End: 2021-03-17 | Stop reason: HOSPADM

## 2021-03-17 RX ADMIN — Medication 20 MG: at 08:11

## 2021-03-17 RX ADMIN — LIDOCAINE HYDROCHLORIDE 100 MG: 20 INJECTION, SOLUTION EPIDURAL; INFILTRATION; INTRACAUDAL; PERINEURAL at 08:08

## 2021-03-17 RX ADMIN — CEFAZOLIN SODIUM 2000 MG: 2 SOLUTION INTRAVENOUS at 08:01

## 2021-03-17 RX ADMIN — ONDANSETRON 4 MG: 2 INJECTION INTRAMUSCULAR; INTRAVENOUS at 08:14

## 2021-03-17 RX ADMIN — KETOROLAC TROMETHAMINE 30 MG: 30 INJECTION, SOLUTION INTRAMUSCULAR; INTRAVENOUS at 08:14

## 2021-03-17 RX ADMIN — HYDROCODONE BITARTRATE AND ACETAMINOPHEN 1 TABLET: 5; 325 TABLET ORAL at 09:29

## 2021-03-17 RX ADMIN — ACETAMINOPHEN 650 MG: 325 TABLET ORAL at 07:15

## 2021-03-17 RX ADMIN — PROPOFOL 150 MG: 10 INJECTION, EMULSION INTRAVENOUS at 08:08

## 2021-03-17 RX ADMIN — DEXAMETHASONE SODIUM PHOSPHATE 8 MG: 4 INJECTION, SOLUTION INTRAMUSCULAR; INTRAVENOUS at 08:14

## 2021-03-17 RX ADMIN — Medication 10 MG: at 08:10

## 2021-03-17 RX ADMIN — SODIUM CHLORIDE, POTASSIUM CHLORIDE, SODIUM LACTATE AND CALCIUM CHLORIDE: 600; 310; 30; 20 INJECTION, SOLUTION INTRAVENOUS at 07:15

## 2021-03-17 RX ADMIN — Medication 20 MG: at 08:28

## 2021-03-17 RX ADMIN — DIMENHYDRINATE 50 MG: 50 TABLET ORAL at 07:15

## 2021-03-17 ASSESSMENT — PAIN - FUNCTIONAL ASSESSMENT: PAIN_FUNCTIONAL_ASSESSMENT: 0-10

## 2021-03-17 ASSESSMENT — PAIN DESCRIPTION - DESCRIPTORS: DESCRIPTORS: SORE

## 2021-03-17 ASSESSMENT — PAIN DESCRIPTION - LOCATION: LOCATION: VAGINA

## 2021-03-17 ASSESSMENT — LIFESTYLE VARIABLES: SMOKING_STATUS: 1

## 2021-03-17 NOTE — OP NOTE
[address], [place] [address]                                OPERATIVE REPORT    PATIENT NAME: Jonas BLANTON                       :        1975  MED REC NO:   [id number]                              ROOM:  ACCOUNT NO:   [de-identified]                           ADMIT DATE: 2021  PROVIDER:     Rony Harris MD    DATE OF PROCEDURE:  2021    PREOPERATIVE DIAGNOSIS:  Condyloma of vulva, perineum, and vagina. POSTOPERATIVE DIAGNOSIS:  Condyloma of vulva, perineum, and vagina. PROCEDURE PERFORMED:  CO2 laser vaporization of condyloma. SURGEON:  Rony Harris M.D. ANESTHESIA:  General.    ESTIMATED BLOOD LOSS:  Nil. COMPLICATIONS:  None. FINDINGS:  Extensive vulvar, perineal, and vaginal condyloma. Status  post hysterectomy. DESCRIPTION OF PROCEDURE:  The patient is taken to the operating room. General anesthesia is administered and the patient is in the lithotomy  position, where she did undergo appropriate prepping. With the laser  setting at 5 maher continuous, the condyloma were vaporized initially on  the areas of the vulva and perineal body. Colposcope was then used to  carefully view the perianal area and the entire vulva and then the  vaginal condyloma were visualized with colposcope attachment and  laser-coated speculum and these condyloma were also vaporized with the  setting of 5 maher. At this point, the procedure was completed. All  instrument and sponge counts are noted to be correct. Lidocaine  ointment was placed intravaginally and over the perineal and vulvar  condyloma vaporization areas. The patient is taken to the recovery room  in good condition.   She will be discharged to home with 5% lidocaine and  Johanny Jha MD    D: 2021 8:58:05       T: 2021 9:05:46     [email or web address]_  Job#: [id number]     Doc#: [id number]    CC:

## 2021-03-17 NOTE — ANESTHESIA PRE PROCEDURE
(duplex)  2,000 mg Intravenous On Call to Su Caldwell MD           Allergies: Allergies   Allergen Reactions    Codeine Itching       Problem List:    Patient Active Problem List   Diagnosis Code    Insomnia G47.00    Depression F32.9    Anxiety associated with depression F41.8    Tobacco abuse Z72.0    Dyslipidemia E78.5    Dysphagia R13.10    Family history of throat cancer Z80.0    Gastroesophageal reflux disease K21.9    H/O: hysterectomy Z90.710    Mild intermittent asthma without complication U23.24       Past Medical History:        Diagnosis Date    Collapsed lung     Depression     Headache(784.0)     Hypotension     Spontaneous pneumothorax     Vasodepressor syncope        Past Surgical History:        Procedure Laterality Date    CHEST TUBE INSERTION      HYSTERECTOMY      TUBAL LIGATION         Social History:    Social History     Tobacco Use    Smoking status: Current Every Day Smoker     Packs/day: 0.50     Years: 11.00     Pack years: 5.50     Types: Cigarettes    Smokeless tobacco: Never Used   Substance Use Topics    Alcohol use:  No                                Ready to quit: Not Answered  Counseling given: Not Answered      Vital Signs (Current):   Vitals:    03/03/21 1453 03/17/21 0703   BP:  105/66   Pulse:  61   Resp:  18   Temp:  36.3 °C (97.4 °F)   TempSrc:  Temporal   SpO2:  98%   Weight: 145 lb (65.8 kg) 145 lb (65.8 kg)   Height: 5' 5\" (1.651 m) 5' 5\" (1.651 m)                                              BP Readings from Last 3 Encounters:   03/17/21 105/66   02/09/21 110/72   01/22/21 102/70       NPO Status: Time of last liquid consumption: 2100                        Time of last solid consumption: 1700                        Date of last liquid consumption: 03/16/21                        Date of last solid food consumption: 03/16/21    BMI:   Wt Readings from Last 3 Encounters:   03/17/21 145 lb (65.8 kg)   02/09/21 153 lb (69.4 kg)   01/22/21 148 lb (67.1 kg)     Body mass index is 24.13 kg/m². CBC:   Lab Results   Component Value Date    WBC 9.2 02/10/2021    RBC 4.27 02/10/2021    HGB 13.7 02/10/2021    HCT 42.2 02/10/2021    MCV 98.8 02/10/2021    RDW 12.6 02/10/2021     02/10/2021       CMP:   Lab Results   Component Value Date     12/31/2019    K 4.2 12/31/2019     12/31/2019    CO2 25 12/31/2019    BUN 8 12/31/2019    CREATININE 0.93 12/31/2019    GFRAA >60 12/31/2019    LABGLOM >60 12/31/2019    GLUCOSE 90 12/31/2019    PROT 7.3 09/19/2017    CALCIUM 9.6 12/31/2019    BILITOT 0.54 09/19/2017    ALKPHOS 101 09/19/2017    AST 18 07/08/2020    ALT 12 07/08/2020       POC Tests: No results for input(s): POCGLU, POCNA, POCK, POCCL, POCBUN, POCHEMO, POCHCT in the last 72 hours. Coags:   Lab Results   Component Value Date    PROTIME 10.7 12/10/2017    INR 1.0 12/10/2017    APTT 29.2 12/10/2017       HCG (If Applicable): No results found for: PREGTESTUR, PREGSERUM, HCG, HCGQUANT     ABGs: No results found for: PHART, PO2ART, NOT5OAU, IWZ8JGB, BEART, T2PAPCNA     Type & Screen (If Applicable):  No results found for: LABABO, LABRH    Drug/Infectious Status (If Applicable):  No results found for: HIV, HEPCAB    COVID-19 Screening (If Applicable):   Lab Results   Component Value Date    COVID19 Not Detected 03/10/2021           Anesthesia Evaluation  Patient summary reviewed and Nursing notes reviewed no history of anesthetic complications:   Airway: Mallampati: II  TM distance: >3 FB   Neck ROM: full  Mouth opening: > = 3 FB Dental:    (+) lower dentures and upper dentures      Pulmonary:normal exam  breath sounds clear to auscultation  (+) asthma: seasonal asthma, current smoker          Patient smoked on day of surgery.                  Cardiovascular:  Exercise tolerance: good (>4 METS),   (+) hyperlipidemia        Rhythm: regular  Rate: normal           Beta Blocker:  Not on Beta Blocker         Neuro/Psych:   (+) headaches: migraine

## 2021-03-17 NOTE — PROGRESS NOTES
Patient verbalizes readiness for discharge. All criteria met. Discharge Criteria    Inpatients must meet Criteria 1 through 7. All other patients are either YES or N/A. If a NO is chosen then Anesthesia or Surgeon must be notified. 1.  Minimum 30 minutes after last dose of sedative medication, minimum 120 minutes after last dose of reversal agent. Yes      2. Systolic BP stable within 20 mmHg for 30 minutes & systolic BP between 90 & 820 or within 10 mmHg of baseline. Yes      3. Pulse between 60 and 100 or within 10 bpm of baseline. Yes      4. Spontaneous respiratory rate >/= 10 per minute. Yes      5. SaO2 >/= 95 or  >/= baseline. Yes      6. Able to cough and swallow or return to baseline function. Yes      7. Alert and oriented or return to baseline mental status. Yes      8. Demonstrates controlled, coordinated movements, ambulates with steady gait, or return to baseline activity function. Yes      9. Minimal or no pain or nausea, or at a level tolerable and acceptable to patient. Yes      10. Takes and retains oral fluids as allowed. Yes      11. Procedural / perioperative site stable. Minimal or no bleeding. Yes          12. If GI endoscopy procedure, minimal or no abdominal distention or passing flatus. N/A      13. Written discharge instructions and emergency telephone number provided. Yes      14. Accompanied by a responsible adult.     Yes

## 2021-03-26 DIAGNOSIS — E78.5 DYSLIPIDEMIA: ICD-10-CM

## 2021-03-26 RX ORDER — ATORVASTATIN CALCIUM 20 MG/1
TABLET, FILM COATED ORAL
Qty: 90 TABLET | Refills: 1 | Status: SHIPPED | OUTPATIENT
Start: 2021-03-26 | End: 2021-08-09 | Stop reason: SDUPTHER

## 2021-03-26 NOTE — TELEPHONE ENCOUNTER
Health Maintenance   Topic Date Due    COVID-19 Vaccine (1) Never done    DTaP/Tdap/Td vaccine (1 - Tdap) 01/11/2022 (Originally 4/10/1992)    Hepatitis C screen  01/11/2022 (Originally 1975)    HIV screen  01/11/2022 (Originally 4/10/1988)    Lipid screen  07/08/2021    Flu vaccine  Completed    Pneumococcal 0-64 years Vaccine  Completed    Hepatitis A vaccine  Aged Out    Hepatitis B vaccine  Aged Out    Hib vaccine  Aged Out    Meningococcal (ACWY) vaccine  Aged Out             (applicable per patient's age: Cancer Screenings, Depression Screening, Fall Risk Screening, Immunizations)    Microalb/Crt.  Ratio (mcg/mg creat)   Date Value   09/19/2017 16     LDL Cholesterol (mg/dL)   Date Value   07/08/2020 151 (H)     AST (U/L)   Date Value   07/08/2020 18     ALT (U/L)   Date Value   07/08/2020 12     BUN (mg/dL)   Date Value   12/31/2019 8      (goal A1C is < 7)   (goal LDL is <100) need 30-50% reduction from baseline     BP Readings from Last 3 Encounters:   03/17/21 104/64   03/17/21 (!) 104/56   02/09/21 110/72    (goal /80)      All Future Testing planned in [facility]:  Lab Frequency Next Occurrence   XR LUMBAR SPINE (2-3 VIEWS) Once 07/08/2021   CBC Auto Differential Once 07/10/2021   ALT Once 07/11/2021   AST Once 55/05/2716   Basic Metabolic Panel Once 44/79/2716   Lipid Panel Once 07/10/2021       Next Visit Date:  Future Appointments   Date Time Provider Claudio Baeza   3/31/2021  3:00 PM Lonnie Hayden MD [facility]   7/12/2021  2:00 PM Aries Sanchez APRN - CNP [facility]            Patient Active Problem List:     Insomnia     Depression     Anxiety associated with depression     Tobacco abuse     Dyslipidemia     Dysphagia     Family history of throat cancer     Gastroesophageal reflux disease     H/O: hysterectomy     Mild intermittent asthma without complication     Condylomata acuminata in female

## 2021-04-01 ENCOUNTER — OFFICE VISIT (OUTPATIENT)
Dept: OBGYN | Age: 46
End: 2021-04-01
Payer: MEDICARE

## 2021-04-01 VITALS
WEIGHT: 149 LBS | SYSTOLIC BLOOD PRESSURE: 114 MMHG | HEIGHT: 65 IN | DIASTOLIC BLOOD PRESSURE: 70 MMHG | BODY MASS INDEX: 24.83 KG/M2

## 2021-04-01 DIAGNOSIS — Z09 POSTOP CHECK: Primary | ICD-10-CM

## 2021-04-01 PROCEDURE — G8427 DOCREV CUR MEDS BY ELIG CLIN: HCPCS | Performed by: OBSTETRICS & GYNECOLOGY

## 2021-04-01 PROCEDURE — G8420 CALC BMI NORM PARAMETERS: HCPCS | Performed by: OBSTETRICS & GYNECOLOGY

## 2021-04-01 PROCEDURE — 4004F PT TOBACCO SCREEN RCVD TLK: CPT | Performed by: OBSTETRICS & GYNECOLOGY

## 2021-04-01 PROCEDURE — 99213 OFFICE O/P EST LOW 20 MIN: CPT | Performed by: OBSTETRICS & GYNECOLOGY

## 2021-04-01 NOTE — PROGRESS NOTES
PROBLEM VISIT     Date of service: 2021    Jojo Nation  Is a 50 y.o.  female    PT's PCP is: KENDALL Marino - [facility]     : 1975                                             Subjective:       Patient's last menstrual period was 2008. OB History    Para Term  AB Living   3 3 3     3   SAB TAB Ectopic Molar Multiple Live Births             3      # Outcome Date GA Lbr Andres/2nd Weight Sex Delivery Anes PTL Lv   3 Term 99 40w0d   F Vag-Spont   LUANN   2 Term 11/15/95 40w0d   M Vag-Spont   LUANN   1 Term 93 40w0d   M Vag-Spont   LUANN        Social History     Tobacco Use   Smoking Status Current Every Day Smoker    Packs/day: 0.50    Years: 11.00    Pack years: 5.50    Types: Cigarettes   Smokeless Tobacco Never Used        Social History     Substance and Sexual Activity   Alcohol Use No       Social History     Substance and Sexual Activity   Sexual Activity Yes    Partners: Male    Comment: pt has had a hysterectomy       Allergies: Codeine    Chief Complaint   Patient presents with    Post-Op Check     pt presents for post op check following co2 laser of condyloma on 3/17 pt states she is fine       Last Yearly:  21    Last pap: 21    Last HPV: never      NURSE: edwin    PE:  Vital Signs  Blood pressure 114/70, height 5' 5\" (1.651 m), weight 149 lb (67.6 kg), last menstrual period 2008, not currently breastfeeding. Labs:    No results found for this visit on 21. NURSE: martín    HPI: Martha Lopez is here today for a postop check CO2 laser vaporization of extensive vulvar and vaginal condyloma. She is doing well at this time with no problems or complaints. No  PT denies fever, chills, nausea and vomiting       Objective: The laser vaporization of the sites is healing well at this point with no signs of infection. Assessment and Plan: We will have patient follow-up in 1 year.   Also she is doing well on her hormone replacement therapy that was reinstituted          Diagnosis Orders   1. Postop check               No follow-ups on file. FF: 15 minutes    There are no Patient Instructions on file for this visit. Over 50%of time spent on counseling and care coordination on: see assessment and plan,  She was also counseled on her preventative health maintenance recommendations and follow-up.       Rodrigo Vázquez,4/1/2021 3:34 PM

## 2021-05-13 DIAGNOSIS — F41.8 ANXIETY ASSOCIATED WITH DEPRESSION: ICD-10-CM

## 2021-05-13 DIAGNOSIS — F34.1 DYSTHYMIA: ICD-10-CM

## 2021-05-13 RX ORDER — DULOXETIN HYDROCHLORIDE 30 MG/1
30 CAPSULE, DELAYED RELEASE ORAL DAILY
Qty: 90 CAPSULE | Refills: 1 | Status: SHIPPED | OUTPATIENT
Start: 2021-05-13 | End: 2022-01-31 | Stop reason: SDUPTHER

## 2021-05-13 RX ORDER — CLONAZEPAM 1 MG/1
1 TABLET ORAL 2 TIMES DAILY PRN
Qty: 30 TABLET | Refills: 0 | Status: SHIPPED | OUTPATIENT
Start: 2021-05-13 | End: 2021-08-04 | Stop reason: SDUPTHER

## 2021-05-13 RX ORDER — DULOXETIN HYDROCHLORIDE 60 MG/1
60 CAPSULE, DELAYED RELEASE ORAL DAILY
Qty: 90 CAPSULE | Refills: 1 | Status: SHIPPED | OUTPATIENT
Start: 2021-05-13 | End: 2022-01-31 | Stop reason: SDUPTHER

## 2021-05-13 RX ORDER — ARIPIPRAZOLE 10 MG/1
10 TABLET ORAL DAILY
Qty: 90 TABLET | Refills: 1 | Status: SHIPPED | OUTPATIENT
Start: 2021-05-13 | End: 2022-01-31 | Stop reason: SDUPTHER

## 2021-05-13 NOTE — TELEPHONE ENCOUNTER
Health Maintenance   Topic Date Due    COVID-19 Vaccine (1) Never done    DTaP/Tdap/Td vaccine (1 - Tdap) 01/11/2022 (Originally 4/10/1992)    Hepatitis C screen  01/11/2022 (Originally 1975)    HIV screen  01/11/2022 (Originally 4/10/1988)    Lipid screen  07/08/2021    Flu vaccine  Completed    Pneumococcal 0-64 years Vaccine  Completed    Hepatitis A vaccine  Aged Out    Hepatitis B vaccine  Aged Out    Hib vaccine  Aged Out    Meningococcal (ACWY) vaccine  Aged Out             (applicable per patient's age: Cancer Screenings, Depression Screening, Fall Risk Screening, Immunizations)    Microalb/Crt.  Ratio (mcg/mg creat)   Date Value   09/19/2017 16     LDL Cholesterol (mg/dL)   Date Value   07/08/2020 151 (H)     AST (U/L)   Date Value   07/08/2020 18     ALT (U/L)   Date Value   07/08/2020 12     BUN (mg/dL)   Date Value   12/31/2019 8      (goal A1C is < 7)   (goal LDL is <100) need 30-50% reduction from baseline     BP Readings from Last 3 Encounters:   04/01/21 114/70   03/17/21 104/64   03/17/21 (!) 104/56    (goal /80)      All Future Testing planned in [facility]:  Lab Frequency Next Occurrence   XR LUMBAR SPINE (2-3 VIEWS) Once 07/08/2021   CBC Auto Differential Once 07/10/2021   ALT Once 07/11/2021   AST Once 58/45/4962   Basic Metabolic Panel Once 49/22/2624   Lipid Panel Once 07/10/2021       Next Visit Date:  Future Appointments   Date Time Provider Claudio Baeza   7/12/2021  2:00 PM KENDALL Patel - CNP [facility]   4/7/2022  1:40 PM Michael Khan MD [facility]            Patient Active Problem List:     Insomnia     Depression     Anxiety associated with depression     Tobacco abuse     Dyslipidemia     Dysphagia     Family history of throat cancer     Gastroesophageal reflux disease     H/O: hysterectomy     Mild intermittent asthma without complication     Condylomata acuminata in female

## 2021-05-13 NOTE — TELEPHONE ENCOUNTER
Controlled Substance Monitoring:    Acute and Chronic Pain Monitoring:   RX Monitoring 5/13/2021   Periodic Controlled Substance Monitoring No signs of potential drug abuse or diversion identified.

## 2021-05-21 DIAGNOSIS — E89.40 PREMATURE SURGICAL MENOPAUSE: ICD-10-CM

## 2021-05-24 RX ORDER — CONJUGATED ESTROGENS 0.62 MG/1
TABLET, FILM COATED ORAL
Qty: 21 TABLET | Refills: 3 | Status: SHIPPED | OUTPATIENT
Start: 2021-05-24 | End: 2021-08-23

## 2021-06-25 ENCOUNTER — TELEPHONE (OUTPATIENT)
Dept: PRIMARY CARE CLINIC | Age: 46
End: 2021-06-25

## 2021-07-19 ENCOUNTER — TELEPHONE (OUTPATIENT)
Dept: PRIMARY CARE CLINIC | Age: 46
End: 2021-07-19

## 2021-07-19 NOTE — TELEPHONE ENCOUNTER
Called patient and reminded her to have her labs done prior to her Friday 7/23 appt. Verbalizes understanding.

## 2021-08-02 ENCOUNTER — HOSPITAL ENCOUNTER (OUTPATIENT)
Age: 46
Discharge: HOME | End: 2021-08-02
Payer: MEDICARE

## 2021-08-02 DIAGNOSIS — E78.5 DYSLIPIDEMIA: ICD-10-CM

## 2021-08-02 LAB
ABSOLUTE EOS #: 0.22 K/UL (ref 0–0.44)
ABSOLUTE IMMATURE GRANULOCYTE: 0.04 K/UL (ref 0–0.3)
ABSOLUTE LYMPH #: 2.05 K/UL (ref 1.1–3.7)
ABSOLUTE MONO #: 0.74 K/UL (ref 0.1–1.2)
ALT SERPL-CCNC: 7 U/L (ref 5–33)
ANION GAP SERPL CALCULATED.3IONS-SCNC: 13 MMOL/L (ref 9–17)
AST SERPL-CCNC: 15 U/L
BASOPHILS # BLD: 1 % (ref 0–2)
BASOPHILS ABSOLUTE: 0.09 K/UL (ref 0–0.2)
BUN BLDV-MCNC: 6 MG/DL (ref 6–20)
BUN/CREAT BLD: 7 (ref 9–20)
CALCIUM SERPL-MCNC: 9.2 MG/DL (ref 8.6–10.4)
CHLORIDE BLD-SCNC: 105 MMOL/L (ref 98–107)
CHOLESTEROL/HDL RATIO: 5.4
CHOLESTEROL: 227 MG/DL
CO2: 23 MMOL/L (ref 20–31)
CREAT SERPL-MCNC: 0.89 MG/DL (ref 0.5–0.9)
DIFFERENTIAL TYPE: ABNORMAL
EOSINOPHILS RELATIVE PERCENT: 2 % (ref 1–4)
GFR AFRICAN AMERICAN: >60 ML/MIN
GFR NON-AFRICAN AMERICAN: >60 ML/MIN
GFR SERPL CREATININE-BSD FRML MDRD: ABNORMAL ML/MIN/{1.73_M2}
GFR SERPL CREATININE-BSD FRML MDRD: ABNORMAL ML/MIN/{1.73_M2}
GLUCOSE BLD-MCNC: 91 MG/DL (ref 70–99)
HCT VFR BLD CALC: 41.5 % (ref 36.3–47.1)
HDLC SERPL-MCNC: 42 MG/DL
HEMOGLOBIN: 13.6 G/DL (ref 11.9–15.1)
IMMATURE GRANULOCYTES: 0 %
LDL CHOLESTEROL: 157 MG/DL (ref 0–130)
LYMPHOCYTES # BLD: 20 % (ref 24–43)
MCH RBC QN AUTO: 31.8 PG (ref 25.2–33.5)
MCHC RBC AUTO-ENTMCNC: 32.8 G/DL (ref 28.4–34.8)
MCV RBC AUTO: 97 FL (ref 82.6–102.9)
MONOCYTES # BLD: 7 % (ref 3–12)
NRBC AUTOMATED: 0 PER 100 WBC
PDW BLD-RTO: 13.1 % (ref 11.8–14.4)
PLATELET # BLD: 235 K/UL (ref 138–453)
PLATELET ESTIMATE: ABNORMAL
PMV BLD AUTO: 9.5 FL (ref 8.1–13.5)
POTASSIUM SERPL-SCNC: 4.4 MMOL/L (ref 3.7–5.3)
RBC # BLD: 4.28 M/UL (ref 3.95–5.11)
RBC # BLD: ABNORMAL 10*6/UL
SEG NEUTROPHILS: 70 % (ref 36–65)
SEGMENTED NEUTROPHILS ABSOLUTE COUNT: 6.91 K/UL (ref 1.5–8.1)
SODIUM BLD-SCNC: 141 MMOL/L (ref 135–144)
TRIGL SERPL-MCNC: 140 MG/DL
VLDLC SERPL CALC-MCNC: ABNORMAL MG/DL (ref 1–30)
WBC # BLD: 10.1 K/UL (ref 3.5–11.3)
WBC # BLD: ABNORMAL 10*3/UL

## 2021-08-02 PROCEDURE — 84460 ALANINE AMINO (ALT) (SGPT): CPT

## 2021-08-02 PROCEDURE — 84450 TRANSFERASE (AST) (SGOT): CPT

## 2021-08-02 PROCEDURE — 36415 COLL VENOUS BLD VENIPUNCTURE: CPT

## 2021-08-02 PROCEDURE — 80061 LIPID PANEL: CPT

## 2021-08-02 PROCEDURE — 85025 COMPLETE CBC W/AUTO DIFF WBC: CPT

## 2021-08-02 PROCEDURE — 80048 BASIC METABOLIC PNL TOTAL CA: CPT

## 2021-08-04 ENCOUNTER — OFFICE VISIT (OUTPATIENT)
Dept: PRIMARY CARE CLINIC | Age: 46
End: 2021-08-04
Payer: MEDICARE

## 2021-08-04 VITALS
RESPIRATION RATE: 20 BRPM | BODY MASS INDEX: 23.88 KG/M2 | TEMPERATURE: 97.8 F | OXYGEN SATURATION: 97 % | DIASTOLIC BLOOD PRESSURE: 70 MMHG | HEART RATE: 78 BPM | SYSTOLIC BLOOD PRESSURE: 120 MMHG | WEIGHT: 143.5 LBS

## 2021-08-04 DIAGNOSIS — F17.210 CIGARETTE NICOTINE DEPENDENCE WITHOUT COMPLICATION: ICD-10-CM

## 2021-08-04 DIAGNOSIS — F34.1 DYSTHYMIA: Primary | ICD-10-CM

## 2021-08-04 DIAGNOSIS — F43.9 STRESS: ICD-10-CM

## 2021-08-04 DIAGNOSIS — E78.5 DYSLIPIDEMIA: ICD-10-CM

## 2021-08-04 DIAGNOSIS — Z12.11 SCREEN FOR COLON CANCER: ICD-10-CM

## 2021-08-04 PROCEDURE — G8420 CALC BMI NORM PARAMETERS: HCPCS | Performed by: NURSE PRACTITIONER

## 2021-08-04 PROCEDURE — G8427 DOCREV CUR MEDS BY ELIG CLIN: HCPCS | Performed by: NURSE PRACTITIONER

## 2021-08-04 PROCEDURE — 4004F PT TOBACCO SCREEN RCVD TLK: CPT | Performed by: NURSE PRACTITIONER

## 2021-08-04 PROCEDURE — 99214 OFFICE O/P EST MOD 30 MIN: CPT | Performed by: NURSE PRACTITIONER

## 2021-08-04 RX ORDER — CLONAZEPAM 1 MG/1
1 TABLET ORAL 2 TIMES DAILY PRN
Qty: 30 TABLET | Refills: 2 | Status: SHIPPED | OUTPATIENT
Start: 2021-08-04 | End: 2022-01-31 | Stop reason: SDUPTHER

## 2021-08-04 RX ORDER — VARENICLINE TARTRATE
KIT
Qty: 1 BOX | Refills: 0 | Status: SHIPPED | OUTPATIENT
Start: 2021-08-04 | End: 2021-08-09 | Stop reason: SDUPTHER

## 2021-08-04 SDOH — ECONOMIC STABILITY: FOOD INSECURITY: WITHIN THE PAST 12 MONTHS, YOU WORRIED THAT YOUR FOOD WOULD RUN OUT BEFORE YOU GOT MONEY TO BUY MORE.: NEVER TRUE

## 2021-08-04 SDOH — ECONOMIC STABILITY: FOOD INSECURITY: WITHIN THE PAST 12 MONTHS, THE FOOD YOU BOUGHT JUST DIDN'T LAST AND YOU DIDN'T HAVE MONEY TO GET MORE.: NEVER TRUE

## 2021-08-04 ASSESSMENT — ENCOUNTER SYMPTOMS
WHEEZING: 0
ABDOMINAL PAIN: 0
DIARRHEA: 0
COUGH: 1
VISUAL CHANGE: 0
RHINORRHEA: 0
CHEST TIGHTNESS: 0
SHORTNESS OF BREATH: 0
VOMITING: 0
CONSTIPATION: 0
SORE THROAT: 0
NAUSEA: 0

## 2021-08-04 ASSESSMENT — SOCIAL DETERMINANTS OF HEALTH (SDOH): HOW HARD IS IT FOR YOU TO PAY FOR THE VERY BASICS LIKE FOOD, HOUSING, MEDICAL CARE, AND HEATING?: NOT HARD AT ALL

## 2021-08-04 NOTE — PROGRESS NOTES
Name: Tristin Valdez  : 1975         Chief Complaint:     Chief Complaint   Patient presents with   [address] Problem     routine check, increase use in Klonopin    Hyperlipidemia       History of Present Illness:      Tristin Valdez is a 48 y.o.  female who presents with Mental Health Problem (routine check, increase use in Klonopin) and Hyperlipidemia      Barb is here today for a routine office visit. Cigarette dependence-patient would like help with cessation. Patient states she would rather take an oral medications and patches. We will try Chantix if approved by her insurance. She is agreeable. Mental Health Problem  The primary symptoms include dysphoric mood (IMPROVED). The primary symptoms do not include delusions, hallucinations, bizarre behavior, disorganized speech, negative symptoms or somatic symptoms. Primary symptoms comment: NERVOUSNESS. The current episode started more than 1 month ago. This is a chronic problem. The onset of the illness is precipitated by emotional stress. The degree of incapacity that she is experiencing as a consequence of her illness is moderate. Sequelae of the illness include harmed interpersonal relations. Additional symptoms of the illness include distractible. Additional symptoms of the illness do not include anhedonia, insomnia, hypersomnia, appetite change, unexpected weight change, fatigue, agitation, psychomotor retardation, feelings of worthlessness, attention impairment, euphoric mood, increased goal-directed activity, flight of ideas, inflated self-esteem, decreased need for sleep, poor judgment, visual change, headaches, abdominal pain or seizures. She does not admit to suicidal ideas. She does not have a plan to attempt suicide. She does not contemplate harming herself. She has not already injured self. She does not contemplate injuring another person. She has not already  injured another person.  Risk factors that are present for mental illness Fabiana Silverman MD   DULoxetine (CYMBALTA) 60 MG extended release capsule Take 1 capsule by mouth daily 5/13/21  Yes Nikita Gutierrez Might APRN - CNP   DULoxetine (CYMBALTA) 30 MG extended release capsule Take 1 capsule by mouth daily 5/13/21  Yes Nikita Rival Might APRN - CNP   ARIPiprazole (ABILIFY) 10 MG tablet Take 1 tablet by mouth daily 5/13/21  Yes Elner Rival Might, APRN - CNP   atorvastatin (LIPITOR) 20 MG tablet take 1 tablet by mouth once daily 3/26/21  Yes Elner Rival Might, APRN - CNP   albuterol sulfate HFA (VENTOLIN HFA) 108 (90 Base) MCG/ACT inhaler Inhale 2 puffs into the lungs every 6 hours as needed for Wheezing 12/1/20  Yes Nikita Mejial Might APRN - CNP   ibuprofen (ADVIL;MOTRIN) 800 MG tablet Take 1 tablet by mouth 3 times daily as needed for Pain 9/22/20 8/4/21 Yes KENDALL Wang CNP   lidocaine (XYLOCAINE) 5 % ointment Apply topically as needed every 2 hours  Patient not taking: Reported on 8/4/2021 3/17/21   Jarvis De Santiago MD        Allergies:       Codeine    Social History:     Tobacco:    reports that she has been smoking cigarettes. She has a 5.50 pack-year smoking history. She has never used smokeless tobacco.  Alcohol:      reports no history of alcohol use. Drug Use:  reports no history of drug use. Family History:     Family History   Problem Relation Age of Onset    Diabetes Mother     Thyroid Disease Mother     Arthritis Mother     Cancer Father     Stroke Father     Asthma Father     Emphysema Father     Seizures Sister     Diabetes Sister     No Known Problems Maternal Grandmother     No Known Problems Maternal Grandfather     No Known Problems Paternal Grandmother     No Known Problems Paternal Grandfather     Other Other         no family h/o breast or ovarian cancer        Review of Systems:     Positive and Negative as described in HPI    Review of Systems   Constitutional: Negative for appetite change, chills, fatigue, fever and unexpected weight change.    HENT: Negative for congestion, rhinorrhea and sore throat. Eyes: Negative for visual disturbance. Respiratory: Positive for cough (intermittent). Negative for shortness of breath and wheezing. Cardiovascular: Positive for dyspnea on exertion. Negative for chest pain and palpitations. Gastrointestinal: Negative for abdominal pain, constipation, diarrhea, nausea and vomiting. Genitourinary: Negative for difficulty urinating and dysuria. Musculoskeletal: Negative for gait problem, myalgias, neck pain and neck stiffness. Skin: Negative for rash. Neurological: Negative for dizziness, seizures, syncope, light-headedness and headaches. Psychiatric/Behavioral: Positive for dysphoric mood (IMPROVED). Negative for agitation, behavioral problems, confusion, decreased concentration, hallucinations, self-injury, sleep disturbance and suicidal ideas. The patient is nervous/anxious. The patient does not have insomnia and is not hyperactive. Physical Exam:   Vitals:  /70 (Position: Sitting)   Pulse 78   Temp 97.8 °F (36.6 °C) (Temporal)   Resp 20   Wt 143 lb 8 oz (65.1 kg)   LMP 04/07/2008   SpO2 97%   BMI 23.88 kg/m²     Physical Exam  Constitutional:       General: She is not in acute distress. Appearance: Normal appearance. She is not ill-appearing. HENT:      Mouth/Throat:      Mouth: Mucous membranes are moist.   Eyes:      General: No scleral icterus. Conjunctiva/sclera: Conjunctivae normal.   Neck:      Vascular: No carotid bruit. Cardiovascular:      Rate and Rhythm: Normal rate and regular rhythm. Heart sounds: Normal heart sounds. No murmur heard. Pulmonary:      Effort: Pulmonary effort is normal. No respiratory distress. Breath sounds: Normal breath sounds. No wheezing. Abdominal:      General: Bowel sounds are normal. There is no distension. Palpations: Abdomen is soft. There is no mass. Tenderness: There is no abdominal tenderness.    Musculoskeletal: Cervical back: Normal range of motion and neck supple. Lymphadenopathy:      Cervical: No cervical adenopathy. Skin:     General: Skin is warm and dry. Findings: No rash. Neurological:      Mental Status: She is alert and oriented to person, place, and time. Psychiatric:         Attention and Perception: Attention normal.         Mood and Affect: Mood is anxious. Speech: Speech normal.         Behavior: Behavior normal. Behavior is cooperative. Thought Content: Thought content normal. Thought content does not include homicidal or suicidal ideation. Thought content does not include homicidal or suicidal plan. Cognition and Memory: Cognition normal.         Judgment: Judgment normal.         Data:     Lab Results   Component Value Date     08/02/2021    K 4.4 08/02/2021     08/02/2021    CO2 23 08/02/2021    BUN 6 08/02/2021    CREATININE 0.89 08/02/2021    GLUCOSE 91 08/02/2021    PROT 7.3 09/19/2017    LABALBU 4.2 09/19/2017    BILITOT 0.54 09/19/2017    ALKPHOS 101 09/19/2017    AST 15 08/02/2021    ALT 7 08/02/2021     Lab Results   Component Value Date    WBC 10.1 08/02/2021    RBC 4.28 08/02/2021    HGB 13.6 08/02/2021    HCT 41.5 08/02/2021    MCV 97.0 08/02/2021    MCH 31.8 08/02/2021    MCHC 32.8 08/02/2021    RDW 13.1 08/02/2021     08/02/2021    MPV 9.5 08/02/2021     Lab Results   Component Value Date    TSH 2.94 02/10/2021     Lab Results   Component Value Date    CHOL 227 08/02/2021    HDL 42 08/02/2021       Assessment/Plan:      Diagnosis Orders   1. Dysthymia     2. Stress  clonazePAM (KLONOPIN) 1 MG tablet   3. Dyslipidemia     4. Cigarette nicotine dependence without complication  varenicline (CHANTIX STARTING MONTH PAK) 0.5 MG X 11 & 1 MG X 42 tablet   5. Screen for colon cancer  POCT Fecal Immunochemical Test (FIT)     Continue current medications, may use clonazepam as needed for severe anxiety. Labs are stable.     Chantix as directed    We will see her back in 6 months, sooner if any problems. 1.  Barb received counseling on the following healthy behaviors: nutrition, exercise, medication adherence and tobacco cessation  2. Patient given educational materials - see patient instructions  3. Was a self-tracking handout given in paper form or via [employer]Sp[employer]? No  If yes, see orders or list here. 4.  Discussed use, benefit, and side effects of prescribed medications. Barriers to medication compliance addressed. All patient questions answered. Pt voiced understanding. 5.  Reviewed prior labs and health maintenance  6. Continue current medications, diet and exercise. Completed Refills   Requested Prescriptions     Signed Prescriptions Disp Refills    clonazePAM (KLONOPIN) 1 MG tablet 30 tablet 2     Sig: Take 1 tablet by mouth 2 times daily as needed for Anxiety for up to 90 days.  varenicline (CHANTIX STARTING MONTH MENDEZ) 0.5 MG X 11 & 1 MG X 42 tablet 1 box 0     Sig: Take by mouth. Return in about 6 months (around 2/4/2022) for Check up.

## 2021-08-04 NOTE — PATIENT INSTRUCTIONS
SURVEY:    You may be receiving a survey from [facility] regarding your visit today. Please complete the survey to enable us to provide the highest quality of care to you and your family. If you cannot score us a very good on any question, please call the office to discuss how we could have made your experience a very good one. Thank you. Sarah Sanchez, APRN-CNP  Demetrius Norman, APRN-[facility]  Claudia Mijares, LPN  Lenora Cox, [facility], [facility]    Patient Education        Anxiety Disorder: Care Instructions  Your Care Instructions     Anxiety is a normal reaction to stress. Difficult situations can cause you to have symptoms such as sweaty palms and a nervous feeling. In an anxiety disorder, the symptoms are far more severe. Constant worry, muscle tension, trouble sleeping, nausea and diarrhea, and other symptoms can make normal daily activities difficult or impossible. These symptoms may occur for no reason, and they can affect your work, school, or social life. Medicines, counseling, and self-care can all help. Follow-up care is a key part of your treatment and safety. Be sure to make and go to all appointments, and call your doctor if you are having problems. It's also a good idea to know your test results and keep a list of the medicines you take. How can you care for yourself at home? · Take medicines exactly as directed. Call your doctor if you think you are having a problem with your medicine. · Go to your counseling sessions and follow-up appointments. · Recognize and accept your anxiety. Then, when you are in a situation that makes you anxious, say to yourself, \"This is not an emergency. I feel uncomfortable, but I am not in danger. I can keep going even if I feel anxious. \"  · Be kind to your body:  ? Relieve tension with exercise or a massage. ? Get enough rest.  ? Avoid alcohol, caffeine, nicotine, and illegal drugs. They can increase your anxiety level and cause sleep problems. ?  Learn and do relaxation techniques. See below for more about these techniques. · Engage your mind. Get out and do something you enjoy. Go to a funny movie, or take a walk or hike. Plan your day. Having too much or too little to do can make you anxious. · Keep a record of your symptoms. Discuss your fears with a good friend or family member, or join a support group for people with similar problems. Talking to others sometimes relieves stress. · Get involved in social groups, or volunteer to help others. Being alone sometimes makes things seem worse than they are. · Get at least 30 minutes of exercise on most days of the week to relieve stress. Walking is a good choice. You also may want to do other activities, such as running, swimming, cycling, or playing tennis or team sports. Relaxation techniques  Do relaxation exercises 10 to 20 minutes a day. You can play soothing, relaxing music while you do them, if you wish. · Tell others in your house that you are going to do your relaxation exercises. Ask them not to disturb you. · Find a comfortable place, away from all distractions and noise. · Lie down on your back, or sit with your back straight. · Focus on your breathing. Make it slow and steady. · Breathe in through your nose. Breathe out through either your nose or mouth. · Breathe deeply, filling up the area between your navel and your rib cage. Breathe so that your belly goes up and down. · Do not hold your breath. · Breathe like this for 5 to 10 minutes. Notice the feeling of calmness throughout your whole body. As you continue to breathe slowly and deeply, relax by doing the following for another 5 to 10 minutes:  · Tighten and relax each muscle group in your body. You can begin at your toes and work your way up to your head. · Imagine your muscle groups relaxing and becoming heavy. · Empty your mind of all thoughts. · Let yourself relax more and more deeply.   · Become aware of the state of calmness that surrounds you. · When your relaxation time is over, you can bring yourself back to alertness by moving your fingers and toes and then your hands and feet and then stretching and moving your entire body. Sometimes people fall asleep during relaxation, but they usually wake up shortly afterward. · Always give yourself time to return to full alertness before you drive a car or do anything that might cause an accident if you are not fully alert. Never play a relaxation tape while you drive a car. When should you call for help? Call 911 anytime you think you may need emergency care. For example, call if:    · You feel you cannot stop from hurting yourself or someone else. Keep the numbers for these national suicide hotlines: 5-373-676-TALK (5-989.184.3297) and 6-460-EGQATCC (4-754.745.4468). If you or someone you know talks about suicide or feeling hopeless, get help right away. Watch closely for changes in your health, and be sure to contact your doctor if:    · You have anxiety or fear that affects your life.     · You have symptoms of anxiety that are new or different from those you had before. Where can you learn more? Go to https://Phone.com.OrthoScan. org and sign in to your [employer] account. Enter P754 in the eHarmony box to learn more about \"Anxiety Disorder: Care Instructions. \"     If you do not have an account, please click on the \"Sign Up Now\" link. Current as of: September 23, 2020               Content Version: 12.9  © 2006-2021 [employer]wise[employer] Incorporated. Care instructions adapted under license by [facility] ([facility]). If you have questions about a medical condition or this instruction, always ask your healthcare professional. [place] [address] any warranty or liability for your use of this information.

## 2021-08-09 ENCOUNTER — TELEPHONE (OUTPATIENT)
Dept: PRIMARY CARE CLINIC | Age: 46
End: 2021-08-09

## 2021-08-09 DIAGNOSIS — F17.210 CIGARETTE NICOTINE DEPENDENCE WITHOUT COMPLICATION: Primary | ICD-10-CM

## 2021-08-09 DIAGNOSIS — E78.5 DYSLIPIDEMIA: ICD-10-CM

## 2021-08-09 DIAGNOSIS — F17.210 CIGARETTE NICOTINE DEPENDENCE WITHOUT COMPLICATION: ICD-10-CM

## 2021-08-09 RX ORDER — ATORVASTATIN CALCIUM 20 MG/1
TABLET, FILM COATED ORAL
Qty: 90 TABLET | Refills: 1 | Status: SHIPPED | OUTPATIENT
Start: 2021-08-09 | End: 2022-01-31 | Stop reason: SDUPTHER

## 2021-08-09 RX ORDER — NICOTINE 21 MG/24HR
1 PATCH, TRANSDERMAL 24 HOURS TRANSDERMAL EVERY 24 HOURS
Qty: 30 PATCH | Refills: 1 | Status: SHIPPED | OUTPATIENT
Start: 2021-08-09 | End: 2022-01-31 | Stop reason: SDUPTHER

## 2021-08-09 RX ORDER — VARENICLINE TARTRATE
KIT
Qty: 1 BOX | Refills: 0 | Status: SHIPPED | OUTPATIENT
Start: 2021-08-09 | End: 2021-10-07 | Stop reason: CLARIF

## 2021-08-09 NOTE — TELEPHONE ENCOUNTER
Patient called the office in regards to Chantix. Patient went to Pharmacy to  her Ch antix but the pharmacy informed her Chantix has been recalled. Patient would like something else prescribed to her to help stop smoking. Please advise.

## 2021-08-21 DIAGNOSIS — E89.40 PREMATURE SURGICAL MENOPAUSE: ICD-10-CM

## 2021-08-23 RX ORDER — CONJUGATED ESTROGENS 0.62 MG/1
TABLET, FILM COATED ORAL
Qty: 21 TABLET | Refills: 3 | Status: SHIPPED | OUTPATIENT
Start: 2021-08-23 | End: 2021-11-24

## 2021-08-29 ENCOUNTER — PATIENT MESSAGE (OUTPATIENT)
Dept: OBGYN | Age: 46
End: 2021-08-29

## 2021-08-29 DIAGNOSIS — N89.8 VAGINAL DISCHARGE: Primary | ICD-10-CM

## 2021-08-30 NOTE — TELEPHONE ENCOUNTER
From: Vu Lynch  To:  Collette Pike MD  Sent: 8/29/2021 8:49 PM EDT  Subject: Prescription Question    I have a bacterial infection again was needing that cream again

## 2021-08-31 RX ORDER — CLINDAMYCIN PHOSPHATE 20 MG/G
CREAM VAGINAL
Qty: 40 G | Refills: 0 | Status: SHIPPED | OUTPATIENT
Start: 2021-08-31 | End: 2022-02-08

## 2021-09-30 ENCOUNTER — TELEPHONE (OUTPATIENT)
Dept: PRIMARY CARE CLINIC | Age: 46
End: 2021-09-30

## 2021-09-30 NOTE — TELEPHONE ENCOUNTER
Contacted patient in regards to open FIT Card order. Patient verbalized understanding and will bring to office ASAP.

## 2021-10-07 ENCOUNTER — OFFICE VISIT (OUTPATIENT)
Dept: PRIMARY CARE CLINIC | Age: 46
End: 2021-10-07
Payer: MEDICARE

## 2021-10-07 VITALS
DIASTOLIC BLOOD PRESSURE: 78 MMHG | BODY MASS INDEX: 23.78 KG/M2 | SYSTOLIC BLOOD PRESSURE: 116 MMHG | WEIGHT: 142.9 LBS | OXYGEN SATURATION: 98 % | HEART RATE: 68 BPM | RESPIRATION RATE: 22 BRPM | TEMPERATURE: 97.3 F

## 2021-10-07 DIAGNOSIS — R10.9 ABDOMINAL CRAMPING: Primary | ICD-10-CM

## 2021-10-07 DIAGNOSIS — R10.12 INTERMITTENT LEFT UPPER QUADRANT ABDOMINAL PAIN: ICD-10-CM

## 2021-10-07 PROCEDURE — G8427 DOCREV CUR MEDS BY ELIG CLIN: HCPCS | Performed by: NURSE PRACTITIONER

## 2021-10-07 PROCEDURE — G8420 CALC BMI NORM PARAMETERS: HCPCS | Performed by: NURSE PRACTITIONER

## 2021-10-07 PROCEDURE — 99214 OFFICE O/P EST MOD 30 MIN: CPT | Performed by: NURSE PRACTITIONER

## 2021-10-07 PROCEDURE — G8484 FLU IMMUNIZE NO ADMIN: HCPCS | Performed by: NURSE PRACTITIONER

## 2021-10-07 PROCEDURE — 4004F PT TOBACCO SCREEN RCVD TLK: CPT | Performed by: NURSE PRACTITIONER

## 2021-10-07 RX ORDER — HYOSCYAMINE SULFATE 0.12 MG/1
1 TABLET SUBLINGUAL EVERY 4 HOURS PRN
Qty: 60 EACH | Refills: 1 | Status: SHIPPED | OUTPATIENT
Start: 2021-10-07 | End: 2021-11-09 | Stop reason: ALTCHOICE

## 2021-10-07 ASSESSMENT — ENCOUNTER SYMPTOMS
HEMATOCHEZIA: 0
COUGH: 0
FLATUS: 0
BLOOD IN STOOL: 0
RHINORRHEA: 0
ABDOMINAL DISTENTION: 0
DIARRHEA: 0
RECTAL PAIN: 0
SORE THROAT: 0
VOMITING: 0
CONSTIPATION: 0
BELCHING: 0
NAUSEA: 0
ABDOMINAL PAIN: 1
SHORTNESS OF BREATH: 0
ANAL BLEEDING: 0
WHEEZING: 0

## 2021-10-07 ASSESSMENT — CROHNS DISEASE ACTIVITY INDEX (CDAI): CDAI SCORE: 0

## 2021-10-07 NOTE — PROGRESS NOTES
Name: Rosaura Russell  : 1975         Chief Complaint:     Chief Complaint   Patient presents with    Abdominal Pain     left lower abdomen, ongoing issue, gets worse at night. Normal blood movements       History of Present Illness:      Barb Riggs is a 48 y.o.  female who presents with Abdominal Pain (left lower abdomen, ongoing issue, gets worse at night. Normal blood movements)      Barb is here today for a routine office visit. Having LLQ pain radiating to LUQ intermittently for a few months, thought it was a GYN issue and contacted her provider for a vaginal infection, took medication without relief. See below for further detail. Abdominal Pain  This is a recurrent problem. The current episode started 1 to 4 weeks ago. The onset quality is undetermined. The problem occurs intermittently. The problem has been unchanged. The pain is located in the LUQ and LLQ. The pain is at a severity of 6/10. The pain is moderate. The quality of the pain is sharp and cramping. The abdominal pain does not radiate. Pertinent negatives include no anorexia, arthralgias, belching, constipation, diarrhea, dysuria, fever, flatus, frequency, headaches, hematochezia, hematuria, melena, myalgias, nausea, vomiting or weight loss. Nothing aggravates the pain. The pain is relieved by nothing. She has tried nothing for the symptoms. The treatment provided no relief. Prior workup: NONE. Her past medical history is significant for abdominal surgery and GERD. There is no history of colon cancer, Crohn's disease, gallstones, irritable bowel syndrome, pancreatitis, PUD or ulcerative colitis.          Past Medical History:     Past Medical History:   Diagnosis Date    Collapsed lung     Depression     Headache(784.0)     Hypotension     Spontaneous pneumothorax     Vasodepressor syncope       Reviewed all health maintenance requirements and ordered appropriate tests  Health Maintenance Due   Topic Date Due    Colon cancer screen colonoscopy  Never done       Past Surgical History:     Past Surgical History:   Procedure Laterality Date    CERVIX SURGERY N/A 3/17/2021    CERVIX ABLATION LASER-CO2 VAPORIZATION OF GENITAL CONDYLOMA performed by Shahzad Zhao MD at [place] [address]  03/17/2021    genital    HYSTERECTOMY      TUBAL LIGATION          Medications:       Prior to Admission medications    Medication Sig Start Date End Date Taking? Authorizing Provider   Hyoscyamine Sulfate SL (LEVSIN/SL) 0.125 MG SUBL Place 1 tablet under the tongue every 4 hours as needed (crampin) 10/7/21  Yes KENDALL Leslie CNP   PREMARIN 0.625 MG tablet take 1 tablet by mouth once daily 8/23/21  Yes Shahzad Zhao MD   atorvastatin (LIPITOR) 20 MG tablet take 1 tablet by mouth once daily 8/9/21  Yes KENDALL Leslie CNP   nicotine (NICODERM CQ) 21 MG/24HR Place 1 patch onto the skin every 24 hours 8/9/21  Yes KENDALL Leslie CNP   clonazePAM (KLONOPIN) 1 MG tablet Take 1 tablet by mouth 2 times daily as needed for Anxiety for up to 90 days. 8/4/21 11/2/21 Yes KENDALL Leslie CNP   DULoxetine (CYMBALTA) 60 MG extended release capsule Take 1 capsule by mouth daily 5/13/21  Yes KENDALL Leslie - CNP   DULoxetine (CYMBALTA) 30 MG extended release capsule Take 1 capsule by mouth daily 5/13/21  Yes KENDALL Leslie CNP   ARIPiprazole (ABILIFY) 10 MG tablet Take 1 tablet by mouth daily 5/13/21  Yes KENDALL Leslie CNP   albuterol sulfate HFA (VENTOLIN HFA) 108 (90 Base) MCG/ACT inhaler Inhale 2 puffs into the lungs every 6 hours as needed for Wheezing 12/1/20  Yes KENDALL Leslie CNP   ibuprofen (ADVIL;MOTRIN) 800 MG tablet Take 1 tablet by mouth 3 times daily as needed for Pain 9/22/20 10/7/21 Yes KENDALL Wang CNP   clindamycin (CLEOCIN) 2 % vaginal cream Place vaginally nightly.  For 7 nights  Patient not taking: Reported on 10/7/2021 8/31/21   Shahzad Zhao, MD   lidocaine (XYLOCAINE) 5 % ointment Apply topically as needed every 2 hours  Patient not taking: Reported on 8/4/2021 3/17/21   Otilia Patten MD        Allergies:       Codeine    Social History:     Tobacco:    reports that she has been smoking cigarettes. She has a 5.50 pack-year smoking history. She has never used smokeless tobacco.  Alcohol:      reports no history of alcohol use. Drug Use:  reports no history of drug use. Family History:     Family History   Problem Relation Age of Onset    Diabetes Mother     Thyroid Disease Mother     Arthritis Mother     Cancer Father     Stroke Father     Asthma Father     Emphysema Father     Seizures Sister     Diabetes Sister     No Known Problems Maternal Grandmother     No Known Problems Maternal Grandfather     No Known Problems Paternal Grandmother     No Known Problems Paternal Grandfather     Other Other         no family h/o breast or ovarian cancer        Review of Systems:     Positive and Negative as described in HPI    Review of Systems   Constitutional: Negative for chills, fatigue, fever and weight loss. HENT: Negative for congestion, rhinorrhea and sore throat. Eyes: Negative for visual disturbance. Respiratory: Negative for cough, shortness of breath and wheezing. Cardiovascular: Negative for chest pain and palpitations. Gastrointestinal: Positive for abdominal pain. Negative for abdominal distention, anal bleeding, anorexia, blood in stool, constipation, diarrhea, flatus, hematochezia, melena, nausea, rectal pain and vomiting. Genitourinary: Negative for difficulty urinating, dysuria, frequency and hematuria. Musculoskeletal: Negative for arthralgias, gait problem, myalgias, neck pain and neck stiffness. Skin: Negative for rash. Neurological: Negative for dizziness, syncope, light-headedness and headaches.        Physical Exam:   Vitals:  /78 (Position: Sitting)   Pulse 68   Temp 97.3 °F (36.3 °C) (Temporal)   Resp 22   Wt 142 lb 14.4 oz (64.8 kg)   LMP 04/07/2008   SpO2 98%   BMI 23.78 kg/m²     Physical Exam  Vitals and nursing note reviewed. Constitutional:       General: She is not in acute distress. Appearance: Normal appearance. She is not ill-appearing. HENT:      Mouth/Throat:      Mouth: Mucous membranes are moist.   Eyes:      General: No scleral icterus. Conjunctiva/sclera: Conjunctivae normal.   Cardiovascular:      Rate and Rhythm: Normal rate and regular rhythm. Heart sounds: No murmur heard. Pulmonary:      Effort: Pulmonary effort is normal.      Breath sounds: Normal breath sounds. No wheezing. Abdominal:      General: Bowel sounds are normal. There is no distension. Palpations: Abdomen is soft. Tenderness: There is abdominal tenderness (mild LUQ). Musculoskeletal:      Cervical back: Normal range of motion and neck supple. Right lower leg: No edema. Left lower leg: No edema. Lymphadenopathy:      Cervical: No cervical adenopathy. Skin:     General: Skin is warm and dry. Findings: No rash. Neurological:      Mental Status: She is alert and oriented to person, place, and time.    Psychiatric:         Mood and Affect: Mood normal.         Behavior: Behavior normal.         Data:     Lab Results   Component Value Date     08/02/2021    K 4.4 08/02/2021     08/02/2021    CO2 23 08/02/2021    BUN 6 08/02/2021    CREATININE 0.89 08/02/2021    GLUCOSE 91 08/02/2021    PROT 7.3 09/19/2017    LABALBU 4.2 09/19/2017    BILITOT 0.54 09/19/2017    ALKPHOS 101 09/19/2017    AST 15 08/02/2021    ALT 7 08/02/2021     Lab Results   Component Value Date    WBC 10.1 08/02/2021    RBC 4.28 08/02/2021    HGB 13.6 08/02/2021    HCT 41.5 08/02/2021    MCV 97.0 08/02/2021    MCH 31.8 08/02/2021    MCHC 32.8 08/02/2021    RDW 13.1 08/02/2021     08/02/2021    MPV 9.5 08/02/2021     Lab Results   Component Value Date    TSH 2.94 02/10/2021 Lab Results   Component Value Date    CHOL 227 08/02/2021    HDL 42 08/02/2021       Assessment/Plan:      Diagnosis Orders   1. Abdominal cramping  Hyoscyamine Sulfate SL (LEVSIN/SL) 0.125 MG SUBL    Chen Flaherty MD, General Surgery, [place]   2. Intermittent left upper quadrant abdominal pain  Chen Flaherty MD, General Surgery, [place]     May try Levsin for immediate relief, would recommend EGD or other evaluation at the recommendation of surgeon. Referral made and patient is agreeable. ER sooner if any worsening. 1.  Barb received counseling on the following healthy behaviors: nutrition, exercise and medication adherence  2. Patient given educational materials - see patient instructions  3. Was a self-tracking handout given in paper form or via [employer]? No  If yes, see orders or list here. 4.  Discussed use, benefit, and side effects of prescribed medications. Barriers to medication compliance addressed. All patient questions answered. Pt voiced understanding. 5.  Reviewed prior labs and health maintenance  6. Continue current medications, diet and exercise. Completed Refills   Requested Prescriptions     Signed Prescriptions Disp Refills    Hyoscyamine Sulfate SL (LEVSIN/SL) 0.125 MG SUBL 60 each 1     Sig: Place 1 tablet under the tongue every 4 hours as needed (crampin)         Return if symptoms worsen or fail to improve.

## 2021-10-07 NOTE — PATIENT INSTRUCTIONS
SURVEY:    You may be receiving a survey from [employer] regarding your visit today. Please complete the survey to enable us to provide the highest quality of care to you and your family. If you cannot score us a very good on any question, please call the office to discuss how we could have made your experience a very good one. Thank you.   Tu Sanchez, APRN-CNP Jaqueline Osler, GOYO Ribeiro, DELILAH Puckett, DELILAH Bermudez, [place]  Nakia, PCA

## 2021-11-05 ENCOUNTER — OFFICE VISIT (OUTPATIENT)
Dept: SURGERY | Age: 46
End: 2021-11-05
Payer: MEDICARE

## 2021-11-05 VITALS
WEIGHT: 142.2 LBS | RESPIRATION RATE: 18 BRPM | SYSTOLIC BLOOD PRESSURE: 99 MMHG | TEMPERATURE: 97.8 F | BODY MASS INDEX: 23.69 KG/M2 | DIASTOLIC BLOOD PRESSURE: 70 MMHG | HEART RATE: 64 BPM | HEIGHT: 65 IN

## 2021-11-05 DIAGNOSIS — Z72.0 TOBACCO ABUSE: ICD-10-CM

## 2021-11-05 DIAGNOSIS — R10.9 PAIN, ABDOMINAL, NONSPECIFIC: Primary | ICD-10-CM

## 2021-11-05 DIAGNOSIS — Z87.09 HISTORY OF PNEUMOTHORAX: ICD-10-CM

## 2021-11-05 PROCEDURE — 4004F PT TOBACCO SCREEN RCVD TLK: CPT | Performed by: SURGERY

## 2021-11-05 PROCEDURE — G8427 DOCREV CUR MEDS BY ELIG CLIN: HCPCS | Performed by: SURGERY

## 2021-11-05 PROCEDURE — G8484 FLU IMMUNIZE NO ADMIN: HCPCS | Performed by: SURGERY

## 2021-11-05 PROCEDURE — 99203 OFFICE O/P NEW LOW 30 MIN: CPT | Performed by: SURGERY

## 2021-11-05 PROCEDURE — G8420 CALC BMI NORM PARAMETERS: HCPCS | Performed by: SURGERY

## 2021-11-05 NOTE — LETTER
[facility] of [place] [address]  Phone: [phone]  Fax: [phone]    Eligio Villar MD    December 12, 2021     KENDALL Nuñez - [facility] [address]    Patient: Gaurav Hamlin   MR Number: [id number]   YOB: 1975   Date of Visit: 11/5/2021       Dear Nadege Lomeli:    Thank you for referring Shlomo Condon to me for evaluation/treatment. Below are the relevant portions of my assessment and plan of care. ASSESSMENT     Diagnosis Orders   1. Pain, abdominal, nonspecific     2. History of pneumothorax     3. BMI 23.0-23.9, adult     4. Tobacco abuse         PLAN    Discussed at length with Ms. Marquis Monahan her long history of nonspecific abdominal pain with diffuse cramping 2-3 times per week. We will proceed with diagnostic endoscopy. Risks, benefits, alternatives thoroughly reviewed and accepted by Ms. Marquis Monahan including GI bleeding, perforation, missed lesions, COVID-19 exposure/infection, etc.  Discussed importance of a healthy, balanced high-fiber low-fat diet with fiber supplementation, increased water intake and physical activity, decreased calories and sugar, etc.  Strongly encourage complete tobacco cessation as well as COVID-19 vaccination. Consider discontinuing Lipitor if her abdominal cramping continues. Would also consider ultrasound of the gallbladder to rule out cholelithiasis. If you have questions, please do not hesitate to call me. I look forward to following Barb along with you.     Sincerely,      Eligio Villar MD normal sinus rhythm

## 2021-11-05 NOTE — PROGRESS NOTES
Hank Zarate MD  General Surgery, Endoscopy  Chief Medical Officer    [address] [address]  Dept: [phone]  Fax: [address]  Chief Complaint   Patient presents with    New Patient     Abdominal cramping. c/o LLQ pain. She has had it since January. She stated it comes and goes, she gets N&V with the episodes. HPI    Ms Elizabeth Han is a pleasant 43-year-old white female kindly referred to me by Abundio Sanchez CNP for evaluation of abdominal cramping. She has had diffuse cramping 2-3 times per week since January. Nauseated with occasional bilious emesis. No significant reflux symptoms. No diarrhea nor constipation. No recent sick contacts nor travel. No previous endoscopy. History of hysterectomy. She had a pneumothorax 20 years ago. No cough, fever nor other respiratory symptoms. No recent weight changes, 142 pounds, BMI 24. No history of COVID-19, no vaccination. No family history of colon cancer to her knowledge. Father treated for throat cancer. Patient smokes less than 1 pack daily. Review of Systems   Constitutional: Negative for activity change, appetite change, chills, fever and unexpected weight change. HENT: Negative for nosebleeds, sneezing, sore throat and trouble swallowing. Eyes: Negative for visual disturbance. Respiratory: Negative for cough, choking and shortness of breath. Cardiovascular: Negative for chest pain, palpitations and leg swelling. Gastrointestinal: Positive for abdominal pain and nausea. Negative for blood in stool and vomiting. Genitourinary: Negative for dysuria, flank pain and hematuria. Musculoskeletal: Negative for back pain, gait problem and myalgias. Allergic/Immunologic: Negative for immunocompromised state. Neurological: Positive for headaches. Negative for dizziness, seizures, syncope and weakness. Hematological: Does not bruise/bleed easily. Psychiatric/Behavioral: Positive for dysphoric mood. Negative for confusion and sleep disturbance.        Past Medical History:   Diagnosis Date    Collapsed lung     Depression     Headache(784.0)     Hypotension     Spontaneous pneumothorax     Vasodepressor syncope        Past Surgical History:   Procedure Laterality Date    CERVIX SURGERY N/A 3/17/2021    CERVIX ABLATION LASER-CO2 VAPORIZATION OF GENITAL CONDYLOMA performed by Nereida Francis MD at [place] [address]  03/17/2021    genital    HYSTERECTOMY      TUBAL LIGATION         Family History   Problem Relation Age of Onset    Diabetes Mother     Thyroid Disease Mother     Arthritis Mother     Cancer Father     Stroke Father     Asthma Father     Emphysema Father     Seizures Sister     Diabetes Sister     No Known Problems Maternal Grandmother     No Known Problems Maternal Grandfather     No Known Problems Paternal Grandmother     No Known Problems Paternal Grandfather     Other Other         no family h/o breast or ovarian cancer        Allergies:  See list    Current Outpatient Medications   Medication Sig Dispense Refill    Hyoscyamine Sulfate SL (LEVSIN/SL) 0.125 MG SUBL Place 1 tablet under the tongue every 4 hours as needed (crampin) 60 each 1    PREMARIN 0.625 MG tablet take 1 tablet by mouth once daily 21 tablet 3    atorvastatin (LIPITOR) 20 MG tablet take 1 tablet by mouth once daily 90 tablet 1    DULoxetine (CYMBALTA) 60 MG extended release capsule Take 1 capsule by mouth daily 90 capsule 1    DULoxetine (CYMBALTA) 30 MG extended release capsule Take 1 capsule by mouth daily 90 capsule 1    ARIPiprazole (ABILIFY) 10 MG tablet Take 1 tablet by mouth daily 90 tablet 1    albuterol sulfate HFA (VENTOLIN HFA) 108 (90 Base) MCG/ACT inhaler Inhale 2 puffs into the lungs every 6 hours as needed for Wheezing 1 Inhaler 3    clindamycin (CLEOCIN) 2 % vaginal cream Place vaginally Gatherings with Friends and Family:     Attends Latter day Services:     Active Member of Clubs or Organizations:     Attends Club or Organization Meetings:     Marital Status:    Intimate Partner Violence:     Fear of Current or Ex-Partner:     Emotionally Abused:     Physically Abused:     Sexually Abused:        BP 99/70 (Site: Right Upper Arm, Position: Sitting, Cuff Size: Medium Adult)   Pulse 64   Temp 97.8 °F (36.6 °C) (Infrared)   Resp 18   Ht 5' 5\" (1.651 m)   Wt 142 lb 3.2 oz (64.5 kg)   LMP 04/07/2008   BMI 23.66 kg/m²      Physical Exam  Vitals and nursing note reviewed. Constitutional:       Appearance: She is well-developed. HENT:      Head: Normocephalic and atraumatic. Eyes:      General: No scleral icterus. Conjunctiva/sclera: Conjunctivae normal.      Pupils: Pupils are equal, round, and reactive to light. Neck:      Vascular: No JVD. Trachea: No tracheal deviation. Cardiovascular:      Rate and Rhythm: Normal rate and regular rhythm. Pulmonary:      Effort: Pulmonary effort is normal. No respiratory distress. Chest:      Chest wall: No tenderness. Abdominal:      General: There is no distension. Palpations: Abdomen is soft. There is no mass. Tenderness: There is no abdominal tenderness. There is no guarding or rebound. Musculoskeletal:         General: Normal range of motion. Cervical back: Normal range of motion and neck supple. Lymphadenopathy:      Cervical: No cervical adenopathy. Skin:     General: Skin is warm and dry. Findings: No erythema or rash. Neurological:      Mental Status: She is alert and oriented to person, place, and time. Cranial Nerves: No cranial nerve deficit. Psychiatric:         Behavior: Behavior normal.         Thought Content:  Thought content normal.         Judgment: Judgment normal.         IMAGING/LABS    CBC Auto Differential  Order: [id number]   Status: Final result     Visible to patient: Yes (seen)     Next appt: 12/28/2021 at 02:30 PM in General Surgery Eligio Villar MD)     Dx: Dyslipidemia     0 Result Notes     Ref Range & Units 8/2/21 1133   WBC 3.5 - 11.3 k/uL 10.1    RBC 3.95 - 5.11 m/uL 4.28    Hemoglobin 11.9 - 15.1 g/dL 13.6    Hematocrit 36.3 - 47.1 % 41.5    MCV 82.6 - 102.9 fL 97.0    MCH 25.2 - 33.5 pg 31.8    MCHC 28.4 - 34.8 g/dL 32.8    RDW 11.8 - 14.4 % 13.1    Platelets 716 - 742 k/uL 235    MPV 8.1 - 13.5 fL 9.5    NRBC Automated 0.0 per 100 WBC 0.0    Differential Type  NOT REPORTED    Seg Neutrophils 36 - 65 % 70 High     Lymphocytes 24 - 43 % 20 Low     Monocytes 3 - 12 % 7    Eosinophils % 1 - 4 % 2    Basophils 0 - 2 % 1    Immature Granulocytes 0 % 0    Segs Absolute 1.50 - 8.10 k/uL 6.91             ASSESSMENT     Diagnosis Orders   1. Pain, abdominal, nonspecific     2. History of pneumothorax     3. BMI 23.0-23.9, adult     4. Tobacco abuse         PLAN    Discussed at length with Ms. Marquis Monahan her long history of nonspecific abdominal pain with diffuse cramping 2-3 times per week. We will proceed with diagnostic endoscopy. Risks, benefits, alternatives thoroughly reviewed and accepted by Ms. Marquis Monahan including GI bleeding, perforation, missed lesions, COVID-19 exposure/infection, etc.  Discussed importance of a healthy, balanced high-fiber low-fat diet with fiber supplementation, increased water intake and physical activity, decreased calories and sugar, etc.  Strongly encourage complete tobacco cessation as well as COVID-19 vaccination. Consider discontinuing Lipitor if her abdominal cramping continues. Would also consider ultrasound of the gallbladder to rule out cholelithiasis.      Eligio Villar MD

## 2021-11-09 ENCOUNTER — TELEPHONE (OUTPATIENT)
Dept: PRIMARY CARE CLINIC | Age: 46
End: 2021-11-09

## 2021-11-09 RX ORDER — DICYCLOMINE HCL 20 MG
20 TABLET ORAL 3 TIMES DAILY PRN
Qty: 90 TABLET | Refills: 1 | Status: SHIPPED | OUTPATIENT
Start: 2021-11-09 | End: 2022-08-10

## 2021-11-09 NOTE — TELEPHONE ENCOUNTER
----- Message from Jyoti Nunez sent at 11/9/2021  8:37 AM EST -----  Subject: Medication Problem    QUESTIONS  Name of Medication? Hyoscyamine Sulfate SL (LEVSIN/SL) 0.125 MG SUBL  Patient-reported dosage and instructions? dissolve aga tongue  What question or problem do you have with the medication? Patient states   that this medication is not helping with the pain, and that she is still   experiencing pain. Wants to know if there is something else she can be   given, or if there is something she can go out and get. She has been using   it for 2 days now. Preferred Pharmacy? [facility]  Pharmacy phone number (if available)? [phone]  Additional Information for Provider?   ---------------------------------------------------------------------------  --------------  [address]  What is the best way for the office to contact you? OK to leave message on   voicemail  Preferred Call Back Phone Number? [phone]  ---------------------------------------------------------------------------  --------------  SCRIPT ANSWERS  Relationship to Patient?  Self

## 2021-11-24 DIAGNOSIS — E89.40 PREMATURE SURGICAL MENOPAUSE: ICD-10-CM

## 2021-11-24 RX ORDER — CONJUGATED ESTROGENS 0.62 MG/1
TABLET, FILM COATED ORAL
Qty: 21 TABLET | Refills: 3 | Status: SHIPPED | OUTPATIENT
Start: 2021-11-24 | End: 2022-01-31 | Stop reason: SDUPTHER

## 2021-12-10 ENCOUNTER — HOSPITAL ENCOUNTER (OUTPATIENT)
Dept: PREADMISSION TESTING | Age: 46
Setting detail: SPECIMEN
Discharge: HOME | End: 2021-12-14
Payer: MEDICARE

## 2021-12-10 DIAGNOSIS — Z01.818 PREOP TESTING: ICD-10-CM

## 2021-12-10 DIAGNOSIS — Z01.818 PREOP TESTING: Primary | ICD-10-CM

## 2021-12-10 PROCEDURE — U0003 INFECTIOUS AGENT DETECTION BY NUCLEIC ACID (DNA OR RNA); SEVERE ACUTE RESPIRATORY SYNDROME CORONAVIRUS 2 (SARS-COV-2) (CORONAVIRUS DISEASE [COVID-19]), AMPLIFIED PROBE TECHNIQUE, MAKING USE OF HIGH THROUGHPUT TECHNOLOGIES AS DESCRIBED BY CMS-2020-01-R: HCPCS

## 2021-12-10 PROCEDURE — C9803 HOPD COVID-19 SPEC COLLECT: HCPCS

## 2021-12-10 PROCEDURE — U0005 INFEC AGEN DETEC AMPLI PROBE: HCPCS

## 2021-12-11 LAB
SARS-COV-2: NORMAL
SARS-COV-2: NOT DETECTED
SOURCE: NORMAL

## 2021-12-12 ASSESSMENT — ENCOUNTER SYMPTOMS
SORE THROAT: 0
VOMITING: 0
CHOKING: 0
NAUSEA: 1
BLOOD IN STOOL: 0
BACK PAIN: 0
SHORTNESS OF BREATH: 0
COUGH: 0
ABDOMINAL PAIN: 1
TROUBLE SWALLOWING: 0

## 2021-12-13 NOTE — PATIENT INSTRUCTIONS
Patient Education        Upper GI Endoscopy: Before Your Procedure  What is an upper GI endoscopy? An upper gastrointestinal (or GI) endoscopy is a test that allows your doctor to look at the inside of your esophagus, stomach, and the first part of your small intestine, called the duodenum. The esophagus is the tube that carries food to your stomach. The doctor uses a thin, lighted tube that bends. It is called an endoscope, or scope. The doctor puts the tip of the scope in your mouth and gently moves it down your throat. The scope is a flexible video camera. The doctor looks at a monitor (like a TV set or a computer screen) as he or she moves the scope. A doctor may do this procedure to look for ulcers, tumors, infection, or bleeding. It also can be used to look for signs of acid backing up into your esophagus. This is called gastroesophageal reflux disease, or GERD. The doctor can use the scope to take a sample of tissue for study (a biopsy). The doctor also can use the scope to take out growths or stop bleeding. Follow-up care is a key part of your treatment and safety. Be sure to make and go to all appointments, and call your doctor if you are having problems. It's also a good idea to know your test results and keep a list of the medicines you take. How do you prepare for the procedure? Procedures can be stressful. This information will help you understand what you can expect. And it will help you safely prepare for your procedure. Preparing for the procedure    · Do not eat or drink anything for 6 to 8 hours before the test. An empty stomach helps your doctor see your stomach clearly during the test. It also reduces your chances of vomiting. If you vomit, there is a small risk that the vomit could enter your lungs.  (This is called aspiration.) If the test is done in an emergency, a tube may be inserted through your nose or mouth to empty your stomach.     · Do not take sucralfate (Carafate) or antacids on the day of the test. These medicines can make it hard for your doctor to see your upper GI tract.     · If your doctor tells you to, stop taking iron supplements 7 to 14 days before the test.     · Be sure you have someone to take you home. Anesthesia and pain medicine will make it unsafe for you to drive or get home on your own.     · Understand exactly what procedure is planned, along with the risks, benefits, and other options. · Tell your doctor ALL the medicines, vitamins, supplements, and herbal remedies you take. Some may increase the risk of problems during your procedure. Your doctor will tell you if you should stop taking any of them before the procedure and how soon to do it.     · If you take aspirin or some other blood thinner, ask your doctor if you should stop taking it before your procedure. Make sure that you understand exactly what your doctor wants you to do. These medicines increase the risk of bleeding.     · Make sure your doctor and the hospital have a copy of your advance directive. If you don't have one, you may want to prepare one. It lets others know your health care wishes. It's a good thing to have before any type of surgery or procedure. What happens on the day of the procedure? · Follow the instructions exactly about when to stop eating and drinking. If you don't, your procedure may be canceled. If your doctor told you to take your medicines on the day of the procedure, take them with only a sip of water.     · Take a bath or shower before you come in for your procedure. Do not apply lotions, perfumes, deodorants, or nail polish.     · Take off all jewelry and piercings. And take out contact lenses, if you wear them. At the hospital or surgery center   · Bring a picture ID.     · The test may take 15 to 30 minutes.     · The doctor may spray medicine on the back of your throat to numb it.  You also will get medicine to prevent pain and to relax you.     · You will lie on your left side. The doctor will put the scope in your mouth and toward the back of your throat. The doctor will tell you when to swallow. This helps the scope move down your throat. You will be able to breathe normally. The doctor will move the scope down your esophagus into your stomach. The doctor also may look at the duodenum.     · If your doctor wants to take a sample of tissue for a biopsy, he or she may use small surgical tools, which are put into the scope, to cut off some tissue. You will not feel a biopsy, if one is taken. The doctor also can use the tools to stop bleeding or to do other treatments, if needed.     · You will stay at the hospital or surgery center for 1 to 2 hours until the medicine you were given wears off. What happens after an upper GI endoscopy? · After the test, you may belch and feel bloated for a while.     · You may have a tickling, dry throat or mouth. You may feel a bit hoarse, and you may have a mild sore throat. These symptoms may last several days. Throat lozenges and warm saltwater gargles can help relieve the throat symptoms.     · Don't drive or operate machinery for 12 hours after the test.     · Your doctor will tell you when you can go back to your usual diet and activities.     · Don't drink alcohol for 12 to 24 hours after the test.   When should you call your doctor? · You have questions or concerns.     · You don't understand how to prepare for your procedure.     · You become ill before the procedure (such as fever, flu, or a cold).     · You need to reschedule or have changed your mind about having the procedure. Where can you learn more? Go to [email or web address] org and sign in to your Xapo account. Enter P790 in the [employer] box to learn more about \"Upper GI Endoscopy: Before Your Procedure. \"     If you do not have an account, please click on the \"Sign Up Now\" link.   Current as of: February 10, 2021               Content Version: 13.0  © 5874-1940 [employer]. Care instructions adapted under license by [facility] ([facility]). If you have questions about a medical condition or this instruction, always ask your healthcare professional. [address] any warranty or liability for your use of this information. Patient Education        Learning About Colonoscopy  What is a colonoscopy? A colonoscopy is a test (also called a procedure) that lets a doctor look inside your large intestine. The doctor uses a thin, lighted tube called a colonoscope. The doctor uses it to look for small growths called polyps, colon or rectal cancer (colorectal cancer), or other problems like bleeding. During the procedure, the doctor can take samples of tissue. The samples can then be checked for cancer or other conditions. The doctor can also take out polyps. How is a colonoscopy done? This procedure is done in a doctor's office or a clinic or hospital. You will get medicine to help you relax and not feel pain. Some people find that they don't remember having the test because of the medicine. The doctor gently moves the colonoscope, or scope, through the colon. The scope is also a small video camera. It lets the doctor see the colon and take pictures. How do you prepare for the procedure? You need to clean out your colon before the procedure so the doctor can see your colon. This depends on which \"colon prep\" your doctor recommends. To clean out your colon, you'll do a \"colon prep\" before the test. This means you stop eating solid foods and drink only clear liquids. You can have water, tea, coffee, clear juices, clear broths, flavored ice pops, and gelatin (such as Jell-O). Do not drink anything red or purple. The day or night before the procedure, you drink a large amount of a special liquid. This causes loose, frequent stools. You will go to the bathroom a lot.  Your doctor may have you drink part of the liquid the evening before and the rest on the day of the test. It's very important to drink all of the liquid. If you have problems drinking it, call your doctor. Arrange to have someone take you home after the test.  What can you expect after a colonoscopy? Your doctor will tell you when you can eat and do your usual activities. Drink a lot of fluid after the test to replace the fluids you may have lost during the colon prep. But don't drink alcohol. Your doctor will talk to you about when you'll need your next colonoscopy. The results of your test and your risk for colorectal cancer will help your doctor decide how often you need to be checked. After the test, you may be bloated or have gas pains. You may need to pass gas. If a biopsy was done or a polyp was removed, you may have streaks of blood in your stool (feces) for a few days. Check with your doctor to see when it is safe to take aspirin and nonsteroidal anti-inflammatory drugs (NSAIDs) again. Problems such as heavy rectal bleeding may not occur until several weeks after the test. This isn't common. But it can happen after polyps are removed. Follow-up care is a key part of your treatment and safety. Be sure to make and go to all appointments, and call your doctor if you are having problems. It's also a good idea to know your test results and keep a list of the medicines you take. Where can you learn more? Go to [email or web address] and sign in to your UbiCast account. Enter H151 in the [facility] box to learn more about \"Learning About Colonoscopy. \"     If you do not have an account, please click on the \"Sign Up Now\" link. Current as of: December 17, 2020               Content Version: 13.0  © 1919-5462 [employer]. Care instructions adapted under license by [facility] ([facility]).  If you have questions about a medical condition or this instruction, always ask your healthcare professional. [employer] disclaims any warranty or liability for your use of this information.

## 2022-01-27 ENCOUNTER — HOSPITAL ENCOUNTER (OUTPATIENT)
Dept: PREADMISSION TESTING | Age: 47
Setting detail: SPECIMEN
Discharge: HOME | End: 2022-01-31
Payer: MEDICARE

## 2022-01-27 DIAGNOSIS — Z01.818 PREOP TESTING: Primary | ICD-10-CM

## 2022-01-27 PROCEDURE — U0005 INFEC AGEN DETEC AMPLI PROBE: HCPCS

## 2022-01-27 PROCEDURE — C9803 HOPD COVID-19 SPEC COLLECT: HCPCS

## 2022-01-27 PROCEDURE — U0003 INFECTIOUS AGENT DETECTION BY NUCLEIC ACID (DNA OR RNA); SEVERE ACUTE RESPIRATORY SYNDROME CORONAVIRUS 2 (SARS-COV-2) (CORONAVIRUS DISEASE [COVID-19]), AMPLIFIED PROBE TECHNIQUE, MAKING USE OF HIGH THROUGHPUT TECHNOLOGIES AS DESCRIBED BY CMS-2020-01-R: HCPCS

## 2022-01-28 LAB
SARS-COV-2: NORMAL
SARS-COV-2: NOT DETECTED
SOURCE: NORMAL

## 2022-01-31 DIAGNOSIS — F34.1 DYSTHYMIA: ICD-10-CM

## 2022-01-31 DIAGNOSIS — E78.5 DYSLIPIDEMIA: ICD-10-CM

## 2022-01-31 DIAGNOSIS — F17.210 CIGARETTE NICOTINE DEPENDENCE WITHOUT COMPLICATION: ICD-10-CM

## 2022-01-31 DIAGNOSIS — J45.20 MILD INTERMITTENT ASTHMA WITHOUT COMPLICATION: ICD-10-CM

## 2022-01-31 DIAGNOSIS — E89.40 PREMATURE SURGICAL MENOPAUSE: ICD-10-CM

## 2022-01-31 DIAGNOSIS — F41.8 ANXIETY ASSOCIATED WITH DEPRESSION: ICD-10-CM

## 2022-01-31 DIAGNOSIS — F43.9 STRESS: ICD-10-CM

## 2022-02-01 RX ORDER — DULOXETIN HYDROCHLORIDE 30 MG/1
30 CAPSULE, DELAYED RELEASE ORAL DAILY
Qty: 90 CAPSULE | Refills: 1 | Status: SHIPPED | OUTPATIENT
Start: 2022-02-01 | End: 2022-08-10 | Stop reason: SDUPTHER

## 2022-02-01 RX ORDER — NICOTINE 21 MG/24HR
1 PATCH, TRANSDERMAL 24 HOURS TRANSDERMAL EVERY 24 HOURS
Qty: 30 PATCH | Refills: 0 | Status: SHIPPED | OUTPATIENT
Start: 2022-02-01 | End: 2022-08-10

## 2022-02-01 RX ORDER — ALBUTEROL SULFATE 90 UG/1
2 AEROSOL, METERED RESPIRATORY (INHALATION) EVERY 6 HOURS PRN
Qty: 1 EACH | Refills: 3 | Status: SHIPPED | OUTPATIENT
Start: 2022-02-01

## 2022-02-01 RX ORDER — ATORVASTATIN CALCIUM 20 MG/1
TABLET, FILM COATED ORAL
Qty: 90 TABLET | Refills: 1 | Status: SHIPPED | OUTPATIENT
Start: 2022-02-01 | End: 2022-08-10 | Stop reason: SDUPTHER

## 2022-02-01 RX ORDER — ARIPIPRAZOLE 10 MG/1
10 TABLET ORAL DAILY
Qty: 90 TABLET | Refills: 1 | Status: SHIPPED | OUTPATIENT
Start: 2022-02-01 | End: 2022-08-10 | Stop reason: SDUPTHER

## 2022-02-01 RX ORDER — DULOXETIN HYDROCHLORIDE 60 MG/1
60 CAPSULE, DELAYED RELEASE ORAL DAILY
Qty: 90 CAPSULE | Refills: 1 | Status: SHIPPED | OUTPATIENT
Start: 2022-02-01 | End: 2022-08-10 | Stop reason: SDUPTHER

## 2022-02-01 RX ORDER — CLONAZEPAM 1 MG/1
1 TABLET ORAL 2 TIMES DAILY PRN
Qty: 60 TABLET | Refills: 0 | Status: SHIPPED | OUTPATIENT
Start: 2022-02-01 | End: 2022-04-24 | Stop reason: SDUPTHER

## 2022-02-01 NOTE — TELEPHONE ENCOUNTER
Health Maintenance   Topic Date Due    Hepatitis C screen  Never done    Depression Monitoring  Never done    HIV screen  Never done    DTaP/Tdap/Td vaccine (1 - Tdap) Never done    Colon cancer screen colonoscopy  Never done    COVID-19 Vaccine (1) 08/04/2022 (Originally 4/10/1980)    Flu vaccine (1) 10/07/2022 (Originally 9/1/2021)    Lipid screen  08/02/2022    Pneumococcal 0-64 years Vaccine (2 of 2 - PPSV23) 04/10/2038    Hepatitis A vaccine  Aged Out    Hepatitis B vaccine  Aged Out    Hib vaccine  Aged Out    Meningococcal (ACWY) vaccine  Aged Out             (applicable per patient's age: Cancer Screenings, Depression Screening, Fall Risk Screening, Immunizations)    Microalb/Crt.  Ratio (mcg/mg creat)   Date Value   09/19/2017 16     LDL Cholesterol (mg/dL)   Date Value   08/02/2021 157 (H)     AST (U/L)   Date Value   08/02/2021 15     ALT (U/L)   Date Value   08/02/2021 7     BUN (mg/dL)   Date Value   08/02/2021 6      (goal A1C is < 7)   (goal LDL is <100) need 30-50% reduction from baseline     BP Readings from Last 3 Encounters:   11/05/21 99/70   10/07/21 116/78   08/04/21 120/70    (goal /80)      All Future Testing planned in [facility]:  Lab Frequency Next Occurrence       Next Visit Date:  Future Appointments   Date Time Provider Claudio Nunezi   2/8/2022 11:00 AM Mykel Sanchez APRN - CNP [facility]   2/11/2022 10:30 AM Hieu Busch MD [facility]   4/7/2022  1:40 PM Mallorie Basurto MD [facility]            Patient Active Problem List:     Insomnia     Depression     Anxiety associated with depression     Tobacco abuse     Dyslipidemia     Dysphagia     Family history of throat cancer     Gastroesophageal reflux disease     H/O: hysterectomy     Mild intermittent asthma without complication     Condylomata acuminata in female     Stress

## 2022-02-01 NOTE — TELEPHONE ENCOUNTER
Controlled Substance Monitoring:    Acute and Chronic Pain Monitoring:   RX Monitoring 2/1/2022   Periodic Controlled Substance Monitoring No signs of potential drug abuse or diversion identified.

## 2022-02-08 ENCOUNTER — OFFICE VISIT (OUTPATIENT)
Dept: PRIMARY CARE CLINIC | Age: 47
End: 2022-02-08
Payer: MEDICARE

## 2022-02-08 VITALS
OXYGEN SATURATION: 98 % | WEIGHT: 146.1 LBS | HEART RATE: 88 BPM | DIASTOLIC BLOOD PRESSURE: 78 MMHG | RESPIRATION RATE: 22 BRPM | TEMPERATURE: 97.7 F | SYSTOLIC BLOOD PRESSURE: 120 MMHG | BODY MASS INDEX: 24.31 KG/M2

## 2022-02-08 DIAGNOSIS — F43.9 STRESS: ICD-10-CM

## 2022-02-08 DIAGNOSIS — E78.5 DYSLIPIDEMIA: ICD-10-CM

## 2022-02-08 DIAGNOSIS — F34.1 DYSTHYMIA: Primary | ICD-10-CM

## 2022-02-08 PROCEDURE — G8427 DOCREV CUR MEDS BY ELIG CLIN: HCPCS | Performed by: NURSE PRACTITIONER

## 2022-02-08 PROCEDURE — G8484 FLU IMMUNIZE NO ADMIN: HCPCS | Performed by: NURSE PRACTITIONER

## 2022-02-08 PROCEDURE — G8420 CALC BMI NORM PARAMETERS: HCPCS | Performed by: NURSE PRACTITIONER

## 2022-02-08 PROCEDURE — 99214 OFFICE O/P EST MOD 30 MIN: CPT | Performed by: NURSE PRACTITIONER

## 2022-02-08 PROCEDURE — 4004F PT TOBACCO SCREEN RCVD TLK: CPT | Performed by: NURSE PRACTITIONER

## 2022-02-08 RX ORDER — QUETIAPINE FUMARATE 50 MG/1
50 TABLET, EXTENDED RELEASE ORAL NIGHTLY
Qty: 90 TABLET | Refills: 1 | Status: SHIPPED | OUTPATIENT
Start: 2022-02-08 | End: 2022-05-12 | Stop reason: SDUPTHER

## 2022-02-08 ASSESSMENT — ENCOUNTER SYMPTOMS
COUGH: 0
SHORTNESS OF BREATH: 0
CONSTIPATION: 0
VISUAL CHANGE: 0
DIARRHEA: 0
VOMITING: 0
WHEEZING: 0
RHINORRHEA: 0
SORE THROAT: 0
ABDOMINAL PAIN: 0
NAUSEA: 0

## 2022-02-08 ASSESSMENT — PATIENT HEALTH QUESTIONNAIRE - PHQ9
SUM OF ALL RESPONSES TO PHQ QUESTIONS 1-9: 0
SUM OF ALL RESPONSES TO PHQ QUESTIONS 1-9: 0
2. FEELING DOWN, DEPRESSED OR HOPELESS: 0
1. LITTLE INTEREST OR PLEASURE IN DOING THINGS: 0
SUM OF ALL RESPONSES TO PHQ9 QUESTIONS 1 & 2: 0
SUM OF ALL RESPONSES TO PHQ QUESTIONS 1-9: 0
SUM OF ALL RESPONSES TO PHQ QUESTIONS 1-9: 0

## 2022-02-08 NOTE — PATIENT INSTRUCTIONS
SURVEY:     You may be receiving a survey from [employer] regarding your visit today. Please complete the survey to enable us to provide the highest quality of care to you and your family. If you cannot score us a very good on any question, please call the office to discuss how we could have made your experience a very good one. Thank you.   KENDALL Mario-GOYO Recinos, GOYO Baires, DELILAH Sherwood, DELILAH Bullard, [facility], SERINA Mccray, CMA  Nakia, PCA

## 2022-02-08 NOTE — PROGRESS NOTES
Name: Radha Antony  : 1975         Chief Complaint:     Chief Complaint   Patient presents with   [address] Problem     routine check, not sleeping, heads spins       History of Present Illness:      Radha Antony is a 48 y.o.  female who presents with Mental Health Problem (routine check, not sleeping, heads spins)      Barb is here today for a routine office visit. Anxiety-stress-worsening, patient is in between moving and has been in several different housing locations recently. She states she is not sleeping well. Her mood has been stable. See below for further comment. Mental Health Problem  The primary symptoms include dysphoric mood (IMPROVED) and somatic symptoms. The primary symptoms do not include delusions, hallucinations, bizarre behavior, disorganized speech or negative symptoms. The current episode started more than 1 month ago. This is a chronic problem. The dysphoric mood began more than 2 weeks ago. The mood has been improving since its onset. She characterizes the problem as mild. The mood includes feelings of sadness. Her change in mood was precipitated by a stressful event. The somatic symptoms began more than 1 month ago. The somatic symptoms have been worsening since their onset. The symptoms are moderate. Somatic symptoms include fatigue. Somatic symptoms do not include headaches, abdominal pain or constipation. The onset of the illness is precipitated by emotional stress. The degree of incapacity that she is experiencing as a consequence of her illness is moderate. Sequelae of the illness include harmed interpersonal relations. Additional symptoms of the illness include insomnia, fatigue, attention impairment and distractible.  Additional symptoms of the illness do not include anhedonia, hypersomnia, unexpected weight change, agitation, psychomotor retardation, feelings of worthlessness, euphoric mood, increased goal-directed activity, flight of ideas, inflated self-esteem, decreased need for sleep, poor judgment, visual change, headaches, abdominal pain or seizures. She does not admit to suicidal ideas. She does not have a plan to attempt suicide. She does not contemplate harming herself. She has not already injured self. She does not contemplate injuring another person. She has not already  injured another person. Risk factors that are present for mental illness include a history of mental illness and a family history of mental illness. Hyperlipidemia  This is a chronic problem. The current episode started more than 1 year ago. The problem is uncontrolled. Recent lipid tests were reviewed and are high. She has no history of diabetes, hypothyroidism or nephrotic syndrome. Factors aggravating her hyperlipidemia include fatty foods and smoking. Pertinent negatives include no chest pain, leg pain or shortness of breath. She is currently on no antihyperlipidemic treatment. The current treatment provides no improvement of lipids. Compliance problems include adherence to diet and adherence to exercise. Risk factors for coronary artery disease include stress, family history and dyslipidemia.          Past Medical History:     Past Medical History:   Diagnosis Date    Collapsed lung     Depression     Headache(784.0)     Hypotension     Spontaneous pneumothorax     Vasodepressor syncope       Reviewed all health maintenance requirements and ordered appropriate tests  Health Maintenance Due   Topic Date Due    Depression Monitoring  Never done    Colon cancer screen colonoscopy  Never done       Past Surgical History:     Past Surgical History:   Procedure Laterality Date    CERVIX SURGERY N/A 3/17/2021    CERVIX ABLATION LASER-CO2 VAPORIZATION OF GENITAL CONDYLOMA performed by Natividad Corbin MD at [place] [id number]  03/17/2021    genital    HYSTERECTOMY      TUBAL LIGATION          Medications:       Prior to Admission medications    Medication Sig Start Date End Date Taking? Authorizing Provider   QUEtiapine (SEROQUEL XR) 50 MG extended release tablet Take 1 tablet by mouth nightly 2/8/22  Yes KENDALL Garzon CNP   estrogens, conjugated, (PREMARIN) 0.625 MG tablet Take 1 tablet by mouth daily 2/1/22  Yes Jalil Jenkins MD   albuterol sulfate HFA (VENTOLIN HFA) 108 (90 Base) MCG/ACT inhaler Inhale 2 puffs into the lungs every 6 hours as needed for Wheezing 2/1/22  Yes KENDALL Garzon CNP   DULoxetine (CYMBALTA) 60 MG extended release capsule Take 1 capsule by mouth daily 2/1/22  Yes KENDALL Garzon CNP   DULoxetine (CYMBALTA) 30 MG extended release capsule Take 1 capsule by mouth daily 2/1/22  Yes KENDALL Garzon CNP   ARIPiprazole (ABILIFY) 10 MG tablet Take 1 tablet by mouth daily 2/1/22  Yes KENDALL Garzon CNP   clonazePAM (KLONOPIN) 1 MG tablet Take 1 tablet by mouth 2 times daily as needed for Anxiety for up to 30 days. 2/1/22 3/3/22 Yes KENDALL Garzon CNP   atorvastatin (LIPITOR) 20 MG tablet take 1 tablet by mouth once daily 2/1/22  Yes KENDALL Garzon CNP   nicotine (NICODERM CQ) 21 MG/24HR Place 1 patch onto the skin every 24 hours 2/1/22  Yes KENDALL Garzon CNP   dicyclomine (BENTYL) 20 MG tablet Take 1 tablet by mouth 3 times daily as needed (cramping) 11/9/21  Yes KENDALL Garzon CNP   ibuprofen (ADVIL;MOTRIN) 800 MG tablet Take 1 tablet by mouth 3 times daily as needed for Pain  Patient not taking: Reported on 2/8/2022 9/22/20 10/7/21  KENDALL Ivy CNP        Allergies:       Codeine    Social History:     Tobacco:    reports that she has been smoking cigarettes. She has a 5.50 pack-year smoking history. She has never used smokeless tobacco.  Alcohol:      reports no history of alcohol use. Drug Use:  reports no history of drug use.     Family History:     Family History   Problem Relation Age of Onset    Diabetes Mother     Thyroid Disease Mother [facility] Arthritis Mother     Cancer Father     Stroke Father     Asthma Father     Emphysema Father     Seizures Sister     Diabetes Sister     No Known Problems Maternal Grandmother     No Known Problems Maternal Grandfather     No Known Problems Paternal Grandmother     No Known Problems Paternal Grandfather     Other Other         no family h/o breast or ovarian cancer        Review of Systems:     Positive and Negative as described in HPI    Review of Systems   Constitutional: Positive for fatigue. Negative for chills, fever and unexpected weight change. HENT: Negative for congestion, rhinorrhea and sore throat. Eyes: Negative for visual disturbance. Respiratory: Negative for cough, shortness of breath and wheezing. Cardiovascular: Negative for chest pain and palpitations. Gastrointestinal: Negative for abdominal pain, constipation, diarrhea, nausea and vomiting. Genitourinary: Negative for difficulty urinating and dysuria. Musculoskeletal: Negative for gait problem, neck pain and neck stiffness. Skin: Negative for rash. Neurological: Negative for dizziness, seizures, syncope, light-headedness and headaches. Psychiatric/Behavioral: Positive for dysphoric mood (IMPROVED) and sleep disturbance. Negative for agitation, behavioral problems, confusion, decreased concentration, hallucinations, self-injury and suicidal ideas. The patient is nervous/anxious and has insomnia. The patient is not hyperactive. Physical Exam:   Vitals:  /78 (Position: Sitting)   Pulse 88   Temp 97.7 °F (36.5 °C) (Temporal)   Resp 22   Wt 146 lb 1.6 oz (66.3 kg)   LMP 04/07/2008   SpO2 98%   BMI 24.31 kg/m²     Physical Exam  Constitutional:       General: She is not in acute distress. Appearance: Normal appearance. She is not ill-appearing. HENT:      Mouth/Throat:      Mouth: Mucous membranes are moist.   Eyes:      General: No scleral icterus.      Conjunctiva/sclera: Conjunctivae normal.   Neck:      Vascular: No carotid bruit. Cardiovascular:      Rate and Rhythm: Normal rate and regular rhythm. Heart sounds: Normal heart sounds. No murmur heard. Pulmonary:      Effort: Pulmonary effort is normal. No respiratory distress. Breath sounds: Normal breath sounds. No wheezing. Abdominal:      General: Bowel sounds are normal. There is no distension. Palpations: Abdomen is soft. There is no mass. Tenderness: There is no abdominal tenderness. Musculoskeletal:      Cervical back: Normal range of motion and neck supple. Lymphadenopathy:      Cervical: No cervical adenopathy. Skin:     General: Skin is warm and dry. Findings: No rash. Neurological:      Mental Status: She is alert and oriented to person, place, and time. Psychiatric:         Attention and Perception: Attention normal.         Mood and Affect: Mood is anxious. Speech: Speech normal.         Behavior: Behavior normal. Behavior is cooperative. Thought Content: Thought content normal. Thought content does not include homicidal or suicidal ideation. Thought content does not include homicidal or suicidal plan.          Cognition and Memory: Cognition normal.         Judgment: Judgment normal.         Data:     Lab Results   Component Value Date     08/02/2021    K 4.4 08/02/2021     08/02/2021    CO2 23 08/02/2021    BUN 6 08/02/2021    CREATININE 0.89 08/02/2021    GLUCOSE 91 08/02/2021    PROT 7.3 09/19/2017    LABALBU 4.2 09/19/2017    BILITOT 0.54 09/19/2017    ALKPHOS 101 09/19/2017    AST 15 08/02/2021    ALT 7 08/02/2021     Lab Results   Component Value Date    WBC 10.1 08/02/2021    RBC 4.28 08/02/2021    HGB 13.6 08/02/2021    HCT 41.5 08/02/2021    MCV 97.0 08/02/2021    MCH 31.8 08/02/2021    MCHC 32.8 08/02/2021    RDW 13.1 08/02/2021     08/02/2021    MPV 9.5 08/02/2021     Lab Results   Component Value Date    TSH 2.94 02/10/2021     Lab Results Component Value Date    CHOL 227 08/02/2021    HDL 42 08/02/2021       Assessment/Plan:      Diagnosis Orders   1. Dysthymia     2. Stress  QUEtiapine (SEROQUEL XR) 50 MG extended release tablet   3. Dyslipidemia  QUEtiapine (SEROQUEL XR) 50 MG extended release tablet    CBC Auto Differential    ALT    AST    Basic Metabolic Panel    Hemoglobin A1C    Lipid Panel     We will continue all current medications and add quetiapine XL 50 mg nightly. She will call me in a few weeks with progress. Sooner if any issues. We will see her back in 6 months with routine labs, sooner if any issues. 1.  Barb received counseling on the following healthy behaviors: nutrition, exercise and medication adherence  2. Patient given educational materials - see patient instructions  3. Was a self-tracking handout given in paper form or via [employer]? No  If yes, see orders or list here. 4.  Discussed use, benefit, and side effects of prescribed medications. Barriers to medication compliance addressed. All patient questions answered. Pt voiced understanding. 5.  Reviewed prior labs and health maintenance  6. Continue current medications, diet and exercise. Completed Refills   Requested Prescriptions     Signed Prescriptions Disp Refills    QUEtiapine (SEROQUEL XR) 50 MG extended release tablet 90 tablet 1     Sig: Take 1 tablet by mouth nightly         Return in about 6 months (around 8/8/2022) for Check up.

## 2022-04-05 ENCOUNTER — OFFICE VISIT (OUTPATIENT)
Dept: PRIMARY CARE CLINIC | Age: 47
End: 2022-04-05
Payer: MEDICARE

## 2022-04-05 VITALS
WEIGHT: 144.6 LBS | HEART RATE: 84 BPM | TEMPERATURE: 99.7 F | RESPIRATION RATE: 18 BRPM | BODY MASS INDEX: 24.06 KG/M2 | SYSTOLIC BLOOD PRESSURE: 102 MMHG | OXYGEN SATURATION: 99 % | DIASTOLIC BLOOD PRESSURE: 70 MMHG

## 2022-04-05 DIAGNOSIS — Z12.11 COLON CANCER SCREENING: ICD-10-CM

## 2022-04-05 DIAGNOSIS — J01.40 ACUTE NON-RECURRENT PANSINUSITIS: Primary | ICD-10-CM

## 2022-04-05 DIAGNOSIS — Z12.31 OTHER SCREENING MAMMOGRAM: ICD-10-CM

## 2022-04-05 DIAGNOSIS — J20.9 BRONCHITIS WITH BRONCHOSPASM: ICD-10-CM

## 2022-04-05 PROCEDURE — 99214 OFFICE O/P EST MOD 30 MIN: CPT | Performed by: NURSE PRACTITIONER

## 2022-04-05 PROCEDURE — 4004F PT TOBACCO SCREEN RCVD TLK: CPT | Performed by: NURSE PRACTITIONER

## 2022-04-05 PROCEDURE — G8427 DOCREV CUR MEDS BY ELIG CLIN: HCPCS | Performed by: NURSE PRACTITIONER

## 2022-04-05 PROCEDURE — G8420 CALC BMI NORM PARAMETERS: HCPCS | Performed by: NURSE PRACTITIONER

## 2022-04-05 RX ORDER — PREDNISONE 20 MG/1
20 TABLET ORAL 2 TIMES DAILY
Qty: 10 TABLET | Refills: 0 | Status: SHIPPED | OUTPATIENT
Start: 2022-04-05 | End: 2022-04-10

## 2022-04-05 RX ORDER — DEXTROMETHORPHAN HYDROBROMIDE, GUAIFENESIN AND PSEUDOEPHEDRINE HYDROCHLORIDE 15; 400; 60 MG/1; MG/1; MG/1
1 TABLET ORAL EVERY 6 HOURS PRN
Qty: 28 TABLET | Refills: 0 | Status: SHIPPED | OUTPATIENT
Start: 2022-04-05 | End: 2022-04-12

## 2022-04-05 RX ORDER — AMOXICILLIN AND CLAVULANATE POTASSIUM 875; 125 MG/1; MG/1
1 TABLET, FILM COATED ORAL 2 TIMES DAILY
Qty: 20 TABLET | Refills: 0 | Status: SHIPPED | OUTPATIENT
Start: 2022-04-05 | End: 2022-04-15

## 2022-04-05 ASSESSMENT — PATIENT HEALTH QUESTIONNAIRE - PHQ9
7. TROUBLE CONCENTRATING ON THINGS, SUCH AS READING THE NEWSPAPER OR WATCHING TELEVISION: 0
2. FEELING DOWN, DEPRESSED OR HOPELESS: 0
9. THOUGHTS THAT YOU WOULD BE BETTER OFF DEAD, OR OF HURTING YOURSELF: 0
SUM OF ALL RESPONSES TO PHQ QUESTIONS 1-9: 0
SUM OF ALL RESPONSES TO PHQ9 QUESTIONS 1 & 2: 0
6. FEELING BAD ABOUT YOURSELF - OR THAT YOU ARE A FAILURE OR HAVE LET YOURSELF OR YOUR FAMILY DOWN: 0
8. MOVING OR SPEAKING SO SLOWLY THAT OTHER PEOPLE COULD HAVE NOTICED. OR THE OPPOSITE, BEING SO FIGETY OR RESTLESS THAT YOU HAVE BEEN MOVING AROUND A LOT MORE THAN USUAL: 0
1. LITTLE INTEREST OR PLEASURE IN DOING THINGS: 0
SUM OF ALL RESPONSES TO PHQ QUESTIONS 1-9: 0
5. POOR APPETITE OR OVEREATING: 0
4. FEELING TIRED OR HAVING LITTLE ENERGY: 0
10. IF YOU CHECKED OFF ANY PROBLEMS, HOW DIFFICULT HAVE THESE PROBLEMS MADE IT FOR YOU TO DO YOUR WORK, TAKE CARE OF THINGS AT HOME, OR GET ALONG WITH OTHER PEOPLE: 0
3. TROUBLE FALLING OR STAYING ASLEEP: 0
SUM OF ALL RESPONSES TO PHQ QUESTIONS 1-9: 0
SUM OF ALL RESPONSES TO PHQ QUESTIONS 1-9: 0

## 2022-04-05 ASSESSMENT — ENCOUNTER SYMPTOMS
SHORTNESS OF BREATH: 1
SINUS PRESSURE: 1
WHEEZING: 1
CHEST TIGHTNESS: 0
SORE THROAT: 1
ALLERGIC/IMMUNOLOGIC NEGATIVE: 1
SINUS PAIN: 1
RHINORRHEA: 1
COUGH: 1
EYES NEGATIVE: 1
GASTROINTESTINAL NEGATIVE: 1

## 2022-04-05 NOTE — PROGRESS NOTES
[facility]  Cas Mcdonald, [address] [facility] PRIMARY CARE  [address]  Dept: [phone]  Dept Fax: [phone]      Name: Patricia Coates  : 1975         Chief Complaint:     Chief Complaint   Patient presents with    Cough     x 4 weeks.  Fever     x 4 weeks. c/o fever coming and going. Gets hot and then freezing cold.  Congestion     x 4 weeks. History of Present Illness:      Patricia Coates is a 48 y.o.  female who presents with Cough (x 4 weeks.), Fever (x 4 weeks. c/o fever coming and going. Gets hot and then freezing cold. ), and Congestion (x 4 weeks. )    Barb is here today for complaints of cough and congestion for 4 weeks. She states her cough is dry but it sounds like it is in her chest.  She has had wheezing and shortness of breath. She has rhinorrhea and congestion. She has headache with sinus pain and pressure. She has a sore throat. Denies ear pain. She has little appetite and fatigued. She has been taking Tylenol cold and severe with no improvement. She did a home covid test last night which was the second one and it was negative. She has been having chills and sweats at home and running a fever.        Past Medical History:     Past Medical History:   Diagnosis Date    Collapsed lung     Depression     Headache(784.0)     Hypotension     Spontaneous pneumothorax     Vasodepressor syncope       Reviewed all health maintenance requirements and ordered appropriate tests  Health Maintenance Due   Topic Date Due    Colorectal Cancer Screen  Never done       Past Surgical History:     Past Surgical History:   Procedure Laterality Date    CERVIX SURGERY N/A 3/17/2021    CERVIX ABLATION LASER-CO2 VAPORIZATION OF GENITAL CONDYLOMA performed by Uriel Howard MD at [place] [address]  2021    genital    HYSTERECTOMY      TUBAL LIGATION          Medications:       Prior to Admission medications    Medication Sig Start Date End Date Taking? Authorizing Provider   amoxicillin-clavulanate (AUGMENTIN) 875-125 MG per tablet Take 1 tablet by mouth 2 times daily for 10 days 4/5/22 4/15/22 Yes KENDALL Navarrete CNP   Pseudoephedrine-DM-GG (CAPMIST DM) 60- MG TABS Take 1 tablet by mouth every 6 hours as needed (Sinus pressure) 4/5/22 4/12/22 Yes KENDALL Navarrete CNP   predniSONE (DELTASONE) 20 MG tablet Take 1 tablet by mouth 2 times daily for 5 days 4/5/22 4/10/22 Yes KENDALL Navarrete CNP   QUEtiapine (SEROQUEL XR) 50 MG extended release tablet Take 1 tablet by mouth nightly 2/8/22  Yes [facility] KENDALL Sanchez CNP   estrogens, conjugated, (PREMARIN) 0.625 MG tablet Take 1 tablet by mouth daily 2/1/22  Yes Liza Briceno MD   albuterol sulfate HFA (VENTOLIN HFA) 108 (90 Base) MCG/ACT inhaler Inhale 2 puffs into the lungs every 6 hours as needed for Wheezing 2/1/22  Yes [facility] KENDALL Sanchez CNP   DULoxetine (CYMBALTA) 60 MG extended release capsule Take 1 capsule by mouth daily 2/1/22  Yes [facility] KENDALL Sanchez CNP   DULoxetine (CYMBALTA) 30 MG extended release capsule Take 1 capsule by mouth daily 2/1/22  Yes [facility] KENDALL Sanchez CNP   ARIPiprazole (ABILIFY) 10 MG tablet Take 1 tablet by mouth daily 2/1/22  Yes [facility] KENDALL Sanchez CNP   clonazePAM (KLONOPIN) 1 MG tablet Take 1 tablet by mouth 2 times daily as needed for Anxiety for up to 30 days.  2/1/22 4/5/22 Yes [facility] KENDALL Sanchez CNP   atorvastatin (LIPITOR) 20 MG tablet take 1 tablet by mouth once daily 2/1/22  Yes [facility] Might, APRN - CNP   nicotine (NICODERM CQ) 21 MG/24HR Place 1 patch onto the skin every 24 hours 2/1/22  Yes [facility] KENDALL Sanchez CNP   dicyclomine (BENTYL) 20 MG tablet Take 1 tablet by mouth 3 times daily as needed (cramping) 11/9/21  Yes [facility] Might, APRN - CNP   ibuprofen (ADVIL;MOTRIN) 800 MG tablet Take 1 tablet by mouth 3 times daily as needed for Pain 9/22/20 4/5/22 Yes Demetrius Norman, tenderness, congestion and rhinorrhea present. Rhinorrhea is clear. Right Turbinates: Swollen. Pale: turbinates boggy. Left Turbinates: Swollen. Right Sinus: Maxillary sinus tenderness and frontal sinus tenderness present. Left Sinus: Maxillary sinus tenderness and frontal sinus tenderness present. Mouth/Throat:      Lips: Pink. Mouth: Mucous membranes are moist.      Pharynx: Posterior oropharyngeal erythema present. Eyes:      Conjunctiva/sclera: Conjunctivae normal.      Pupils: Pupils are equal, round, and reactive to light. Cardiovascular:      Rate and Rhythm: Normal rate and regular rhythm. Heart sounds: Normal heart sounds. Pulmonary:      Effort: Pulmonary effort is normal.      Breath sounds: Wheezing (expiratory wheezes in all 4 quadrants) present. Comments: Bronchospasms with deep inspirations during exam  Musculoskeletal:         General: Normal range of motion. Cervical back: Normal range of motion. Lymphadenopathy:      Cervical: Cervical adenopathy (anterior) present. Skin:     General: Skin is warm. Capillary Refill: Capillary refill takes less than 2 seconds. Neurological:      General: No focal deficit present. Mental Status: She is alert and oriented to person, place, and time.    Psychiatric:         Mood and Affect: Mood normal.         Data:     Lab Results   Component Value Date     08/02/2021    K 4.4 08/02/2021     08/02/2021    CO2 23 08/02/2021    BUN 6 08/02/2021    CREATININE 0.89 08/02/2021    GLUCOSE 91 08/02/2021    PROT 7.3 09/19/2017    LABALBU 4.2 09/19/2017    BILITOT 0.54 09/19/2017    ALKPHOS 101 09/19/2017    AST 15 08/02/2021    ALT 7 08/02/2021     Lab Results   Component Value Date    WBC 10.1 08/02/2021    RBC 4.28 08/02/2021    HGB 13.6 08/02/2021    HCT 41.5 08/02/2021    MCV 97.0 08/02/2021    MCH 31.8 08/02/2021    MCHC 32.8 08/02/2021    RDW 13.1 08/02/2021     08/02/2021    MPV 9.5 08/02/2021     Lab Results   Component Value Date    TSH 2.94 02/10/2021     Lab Results   Component Value Date    CHOL 227 08/02/2021    HDL 42 08/02/2021       Assessment/Plan:      Diagnosis Orders   1. Acute non-recurrent pansinusitis  amoxicillin-clavulanate (AUGMENTIN) 875-125 MG per tablet    Pseudoephedrine-DM-GG (CAPMIST DM) 60- MG TABS   2. Bronchitis with bronchospasm  amoxicillin-clavulanate (AUGMENTIN) 875-125 MG per tablet    Pseudoephedrine-DM-GG (CAPMIST DM) 60- MG TABS    predniSONE (DELTASONE) 20 MG tablet   3. Colon cancer screening  POCT Fecal Immunochemical Test (FIT)   4. Other screening mammogram  PRINCE [facility]ATSHEVA DIGITAL SCREEN BILATERAL     Reviewed exam findings and plan of care as well as supportive management as listed below with patient at bedside. · Start Augmentin, Prednisone, Capmist, and continue  albuterol MDI as prescribed. Encouraged to take antibiotic (if prescribed) with probiotic and food to decrease GI side effects. Reviewed administration and side effects. · Supportive management encouraged including Tylenol over-the-counter as instructed on packaging for fever and discomfort. Cool mist humidifiers. Hot tea with honey and lemon for cough as needed. · Instructions pertinent to diagnoses and treatment plan given with AVS.  · Encouraged to follow up to ER for any difficulty breathing, shortness of breath or inability to swallow including hives and fevers above 103°. Requested Prescriptions     Signed Prescriptions Disp Refills    amoxicillin-clavulanate (AUGMENTIN) 875-125 MG per tablet 20 tablet 0     Sig: Take 1 tablet by mouth 2 times daily for 10 days    Pseudoephedrine-DM-GG (CAPMIST DM) 60- MG TABS 28 tablet 0     Sig: Take 1 tablet by mouth every 6 hours as needed (Sinus pressure)    predniSONE (DELTASONE) 20 MG tablet 10 tablet 0     Sig: Take 1 tablet by mouth 2 times daily for 5 days         No follow-ups on file.

## 2022-04-05 NOTE — PATIENT INSTRUCTIONS
SURVEY:     You may be receiving a survey from [employer] regarding your visit today. Please complete the survey to enable us to provide the highest quality of care to you and your family. If you cannot score us a very good on any question, please call the office to discuss how we could have made your experience a very good one. Thank you. Mandeep Sanchez, APRN-CNP  Yee Manjinder, CNP  Parishraina Fenton, LPN  Amy Cox, LPN  Austin Escobar, [facility]  [facility], [facility]  Shazia, CMA  Nakia, PCA    Patient Education        Bronchitis: Care Instructions  Your Care Instructions     Bronchitis is inflammation of the bronchial tubes, which carry air to the lungs. The tubes swell and produce mucus, or phlegm. The mucus and inflamedbronchial tubes make you cough. You may have trouble breathing. Most cases of bronchitis are caused by viruses like those that cause colds. Antibiotics usually do not help and they may be harmful. Bronchitis usually develops rapidly and lasts about 2 to 3 weeks in otherwisehealthy people. Follow-up care is a key part of your treatment and safety. Be sure to make and go to all appointments, and call your doctor if you are having problems. It's also a good idea to know your test results and keep alist of the medicines you take. How can you care for yourself at home?  Take all medicines exactly as prescribed. Call your doctor if you think you are having a problem with your medicine.  Get some extra rest.   Take an over-the-counter pain medicine, such as acetaminophen (Tylenol), ibuprofen (Advil, Motrin), or naproxen (Aleve) to reduce fever and relieve body aches. Read and follow all instructions on the label.  Do not take two or more pain medicines at the same time unless the doctor told you to. Many pain medicines have acetaminophen, which is Tylenol. Too much acetaminophen (Tylenol) can be harmful.  Take an over-the-counter cough medicine to help quiet a dry, hacking cough so that you can sleep.  Avoid cough medicines that have more than one active ingredient. Read and follow all instructions on the label.  Do not smoke. Smoking can make bronchitis worse. If you need help quitting, talk to your doctor about stop-smoking programs and medicines. These can increase your chances of quitting for good. When should you call for help? Call 911 anytime you think you may need emergency care. For example, call if:     You have severe trouble breathing. Call your doctor now or seek immediate medical care if:     You have new or worse trouble breathing.      You cough up dark brown or bloody mucus (sputum).      You have a new or higher fever.      You have a new rash. Watch closely for changes in your health, and be sure to contact your doctor if:     You cough more deeply or more often, especially if you notice more mucus or a change in the color of your mucus.      You are not getting better as expected. Where can you learn more? Go to https://[email or web address]".Yield Software. org and sign in to your Dsg.nr account. Enter H333 in the [employer] box to learn more about \"Bronchitis: Care Instructions. \"     If you do not have an account, please click on the \"Sign Up Now\" link. Current as of: July 6, 2021               Content Version: 13.2  © 2006-2022 [employer]. Care instructions adapted under license by [facility] ([facility]). If you have questions about a medical condition or this instruction, always ask your healthcare professional. [place] [address] any warranty or liability for your use of this information. Patient Education        Sinusitis: Care Instructions  Your Care Instructions     Sinusitis is an infection of the lining of the sinus cavities in your head. Sinusitis often follows a cold. It causes pain and pressure in your head andface. In most cases, sinusitis gets better on its own in 1 to 2 weeks. But some mildsymptoms may last for several weeks.  Sometimes antibiotics are needed. Follow-up care is a key part of your treatment and safety. Be sure to make and go to all appointments, and call your doctor if you are having problems. It's also a good idea to know your test results and keep alist of the medicines you take. How can you care for yourself at home?  Take an over-the-counter pain medicine, such as acetaminophen (Tylenol), ibuprofen (Advil, Motrin), or naproxen (Aleve). Read and follow all instructions on the label.  If the doctor prescribed antibiotics, take them as directed. Do not stop taking them just because you feel better. You need to take the full course of antibiotics.  Be careful when taking over-the-counter cold or flu medicines and Tylenol at the same time. Many of these medicines have acetaminophen, which is Tylenol. Read the labels to make sure that you are not taking more than the recommended dose. Too much acetaminophen (Tylenol) can be harmful.  Breathe warm, moist air from a steamy shower, a hot bath, or a sink filled with hot water. Avoid cold, dry air. Using a humidifier in your home may help. Follow the directions for cleaning the machine.  Use saline (saltwater) nasal washes. This can help keep your nasal passages open and wash out mucus and bacteria. You can buy saline nose drops at a grocery store or drugstore. Or you can make your own at home by adding 1 teaspoon of salt and 1 teaspoon of baking soda to 2 cups of distilled water. If you make your own, fill a bulb syringe with the solution, insert the tip into your nostril, and squeeze gently. Nicola Mankato your nose.  Put a hot, wet towel or a warm gel pack on your face 3 or 4 times a day for 5 to 10 minutes each time.  Try a decongestant nasal spray like oxymetazoline (Afrin). Do not use it for more than 3 days in a row. Using it for more than 3 days can make your congestion worse. When should you call for help?    Call your doctor now or seek immediate medical care if:     You have new or worse swelling or redness in your face or around your eyes.      You have a new or higher fever. Watch closely for changes in your health, and be sure to contact your doctor if:     You have new or worse facial pain.      The mucus from your nose becomes thicker (like pus) or has new blood in it.      You are not getting better as expected. Where can you learn more? Go to https://HiLo TicketspeSeeControl.Survela. org and sign in to your [employer] account. Enter Q392 in the [facility] box to learn more about \"Sinusitis: Care Instructions. \"     If you do not have an account, please click on the \"Sign Up Now\" link. Current as of: September 8, 2021               Content Version: 13.2  © 2006-2022 Healthwise, Incorporated. Care instructions adapted under license by [facility] ([facility]). If you have questions about a medical condition or this instruction, always ask your healthcare professional. [place] [address] any warranty or liability for your use of this information.

## 2022-04-07 ENCOUNTER — HOSPITAL ENCOUNTER (OUTPATIENT)
Age: 47
Setting detail: SPECIMEN
Discharge: HOME | End: 2022-04-07
Payer: MEDICARE

## 2022-04-07 ENCOUNTER — OFFICE VISIT (OUTPATIENT)
Dept: OBGYN | Age: 47
End: 2022-04-07
Payer: MEDICARE

## 2022-04-07 VITALS — WEIGHT: 140 LBS | HEIGHT: 65 IN | BODY MASS INDEX: 23.32 KG/M2

## 2022-04-07 DIAGNOSIS — N90.89 VULVAR LESION: ICD-10-CM

## 2022-04-07 DIAGNOSIS — Z01.419 WOMEN'S ANNUAL ROUTINE GYNECOLOGICAL EXAMINATION: Primary | ICD-10-CM

## 2022-04-07 DIAGNOSIS — Z01.419 WOMEN'S ANNUAL ROUTINE GYNECOLOGICAL EXAMINATION: ICD-10-CM

## 2022-04-07 DIAGNOSIS — E89.40 PREMATURE SURGICAL MENOPAUSE: ICD-10-CM

## 2022-04-07 PROCEDURE — 99396 PREV VISIT EST AGE 40-64: CPT | Performed by: OBSTETRICS & GYNECOLOGY

## 2022-04-07 PROCEDURE — 56605 BIOPSY OF VULVA/PERINEUM: CPT | Performed by: OBSTETRICS & GYNECOLOGY

## 2022-04-07 PROCEDURE — 87624 HPV HI-RISK TYP POOLED RSLT: CPT

## 2022-04-07 PROCEDURE — 88305 TISSUE EXAM BY PATHOLOGIST: CPT

## 2022-04-07 PROCEDURE — G0145 SCR C/V CYTO,THINLAYER,RESCR: HCPCS

## 2022-04-07 NOTE — PROGRESS NOTES
YEARLY [facility]    Date of service: 2022    Jane Fisher  Is a 48 y.o.   female    PT's PCP is: KENDALL Hernandez CNP     : 1975                                             Subjective:       Patient's last menstrual period was 2008.      Are your menses regular: not applicable    OB History    Para Term  AB Living   3 3 3     3   SAB IAB Ectopic Molar Multiple Live Births             3      # Outcome Date GA Lbr Andres/2nd Weight Sex Delivery Anes PTL Lv   3 Term 99 40w0d   F Vag-Spont   LUANN   2 Term 11/15/95 40w0d   M Vag-Spont   LUANN   1 Term 93 40w0d   M Vag-Spont   LUANN        Social History     Tobacco Use   Smoking Status Current Every Day Smoker    Packs/day: 0.50    Years: 11.00    Pack years: 5.50    Types: Cigarettes   Smokeless Tobacco Never Used        Social History     Substance and Sexual Activity   Alcohol Use No       Family History   Problem Relation Age of Onset    Diabetes Mother     Thyroid Disease Mother     Arthritis Mother     Cancer Father     Stroke Father     Asthma Father     Emphysema Father     Seizures Sister     Diabetes Sister     No Known Problems Maternal Grandmother     No Known Problems Maternal Grandfather     No Known Problems Paternal Grandmother     No Known Problems Paternal Grandfather     Other Other         no family h/o breast or ovarian cancer        Allergies: Codeine      Current Outpatient Medications:     amoxicillin-clavulanate (AUGMENTIN) 875-125 MG per tablet, Take 1 tablet by mouth 2 times daily for 10 days, Disp: 20 tablet, Rfl: 0    Pseudoephedrine-DM-GG (CAPMIST DM) 60- MG TABS, Take 1 tablet by mouth every 6 hours as needed (Sinus pressure), Disp: 28 tablet, Rfl: 0    predniSONE (DELTASONE) 20 MG tablet, Take 1 tablet by mouth 2 times daily for 5 days, Disp: 10 tablet, Rfl: 0    QUEtiapine (SEROQUEL XR) 50 MG extended release tablet, Take 1 tablet by mouth nightly, Disp: 90 tablet, Rfl: 1    estrogens, conjugated, (PREMARIN) 0.625 MG tablet, Take 1 tablet by mouth daily, Disp: 30 tablet, Rfl: 12    albuterol sulfate HFA (VENTOLIN HFA) 108 (90 Base) MCG/ACT inhaler, Inhale 2 puffs into the lungs every 6 hours as needed for Wheezing, Disp: 1 each, Rfl: 3    DULoxetine (CYMBALTA) 60 MG extended release capsule, Take 1 capsule by mouth daily, Disp: 90 capsule, Rfl: 1    DULoxetine (CYMBALTA) 30 MG extended release capsule, Take 1 capsule by mouth daily, Disp: 90 capsule, Rfl: 1    ARIPiprazole (ABILIFY) 10 MG tablet, Take 1 tablet by mouth daily, Disp: 90 tablet, Rfl: 1    atorvastatin (LIPITOR) 20 MG tablet, take 1 tablet by mouth once daily, Disp: 90 tablet, Rfl: 1    nicotine (NICODERM CQ) 21 MG/24HR, Place 1 patch onto the skin every 24 hours, Disp: 30 patch, Rfl: 0    dicyclomine (BENTYL) 20 MG tablet, Take 1 tablet by mouth 3 times daily as needed (cramping), Disp: 90 tablet, Rfl: 1    clonazePAM (KLONOPIN) 1 MG tablet, Take 1 tablet by mouth 2 times daily as needed for Anxiety for up to 30 days. , Disp: 60 tablet, Rfl: 0    ibuprofen (ADVIL;MOTRIN) 800 MG tablet, Take 1 tablet by mouth 3 times daily as needed for Pain, Disp: 30 tablet, Rfl: 0    Social History     Substance and Sexual Activity   Sexual Activity Yes    Partners: Male    Comment: pt has had a hysterectomy       Any bleeding or pain with intercourse: No    Last Yearly:  1/14/21    Last pap: 1/14/21    Last HPV: never    Last Mammogram: 3/2/21 (apt on 4/19)    Last Dexascan 3/2/21    Do you do self breast exams: No    Past Medical History:   Diagnosis Date    Collapsed lung     Depression     Headache(784.0)     Hypotension     Spontaneous pneumothorax     Vasodepressor syncope        Past Surgical History:   Procedure Laterality Date    CERVIX SURGERY N/A 3/17/2021    CERVIX ABLATION LASER-CO2 VAPORIZATION OF GENITAL CONDYLOMA performed by Chyna Fin[facility]gan [facility] at [facility] 11  03/17/2021    genital    HYSTERECTOMY      TUBAL LIGATION         Family History   Problem Relation Age of Onset    Diabetes Mother     Thyroid Disease Mother     Arthritis Mother     Cancer Father     Stroke Father     Asthma Father     Emphysema Father     Seizures Sister     Diabetes Sister     No Known Problems Maternal Grandmother     No Known Problems Maternal Grandfather     No Known Problems Paternal Grandmother     No Known Problems Paternal Grandfather     Other Other         no family h/o breast or ovarian cancer        Any family history of breast or ovarian cancer: No    Any family history of blood clots: No      Chief Complaint   Patient presents with    Gynecologic Exam     pt presents for yearly - no issues          Nurse: edwin    PE:  Vital Signs  Height 5' 5\" (1.651 m), weight 140 lb (63.5 kg), last menstrual period 04/07/2008, not currently breastfeeding. Labs:    No results found for this visit on 04/07/22. HPI: The patient is here today for a yearly exam. of Significance is that she had history of CO2 laser vaporization of condyloma. She does states she has a rough raised area on the vulva    NoPT denies fever, chills, nausea and vomiting       Objective  Lymphatic:   no lymphadenopathy  Heent:   negative   Cor: regular rate and rhythm, no murmurs              Pul:clear to auscultation bilaterally- no wheezes, rales or rhonchi, normal air movement, no respiratory distress      GI: Abdomen soft, non-tender.  BS normal. No masses,  No organomegaly           Extremities: normal strength, tone, and muscle mass   Breasts: Breast:normal appearance, no masses or tenderness, Inspection negative, No nipple retraction or dimpling, No nipple discharge or bleeding, No axillary or supraclavicular adenopathy, Normal to palpation without dominant masses   Pelvic Exam: GENITAL/URINARY:  External Genitalia:  General appearance; normal, Hair distribution; normal, Lisabeth Ambrosia is present. There is lesion less than a centimeter in length that is raised and white and rough but it has puckered the skin lateral to it. This is at about 5:00 between the labia majora and the posterior fourchette    Vagina:  General appearance normal, Discharge absent, Lesions absent, Pelvic support normal  Uterus:  Hystory of hysterectomy  Adenexa:  Hystory of ovary removal                                      Vaginal discharge: no vaginal discharge             Over 50% of time spent on counseling and care coordination on: see assessment and plan                        Assessment and Plan: Because of this atypical finding I did recommend a biopsy of the area the area was thoroughly prepped with Betadine infiltrated with 1% lidocaine biopsy forceps were used to biopsy the area where there was the maximum thickening and whitening of the skin        Diagnosis Orders   1. Women's annual routine gynecological examination  PAP SMEAR   2. Premature surgical menopause  estrogens, conjugated, (PREMARIN) 0.625 MG tablet   3. Vulvar lesion  HI BIOPSY VULVA/PERINEUM,ONE LESN    Surgical Pathology       I am having Barb Beal maintain her ibuprofen, dicyclomine, albuterol sulfate HFA, DULoxetine, DULoxetine, ARIPiprazole, clonazePAM, atorvastatin, nicotine, QUEtiapine, amoxicillin-clavulanate, Capmist DM, predniSONE, and estrogens (conjugated).

## 2022-04-11 LAB — SURGICAL PATHOLOGY REPORT: NORMAL

## 2022-04-12 ENCOUNTER — TELEPHONE (OUTPATIENT)
Dept: PRIMARY CARE CLINIC | Age: 47
End: 2022-04-12

## 2022-04-12 DIAGNOSIS — Z12.11 COLON CANCER SCREENING: ICD-10-CM

## 2022-04-12 LAB
CONTROL: PRESENT
HEMOCCULT STL QL: NEGATIVE

## 2022-04-12 PROCEDURE — 82274 ASSAY TEST FOR BLOOD FECAL: CPT | Performed by: NURSE PRACTITIONER

## 2022-04-12 NOTE — TELEPHONE ENCOUNTER
----- Message from KENDALL Gutierres CNP sent at 4/12/2022 11:59 AM EDT -----  Please notify patient of normal lab results.   Thanks Desmond

## 2022-04-18 ENCOUNTER — OFFICE VISIT (OUTPATIENT)
Dept: OBGYN | Age: 47
End: 2022-04-18
Payer: MEDICARE

## 2022-04-18 VITALS
SYSTOLIC BLOOD PRESSURE: 110 MMHG | HEIGHT: 65 IN | WEIGHT: 140 LBS | DIASTOLIC BLOOD PRESSURE: 64 MMHG | BODY MASS INDEX: 23.32 KG/M2

## 2022-04-18 DIAGNOSIS — D07.1 VIN III (VULVAR INTRAEPITHELIAL NEOPLASIA III): Primary | ICD-10-CM

## 2022-04-18 PROCEDURE — 99213 OFFICE O/P EST LOW 20 MIN: CPT | Performed by: OBSTETRICS & GYNECOLOGY

## 2022-04-18 PROCEDURE — G8420 CALC BMI NORM PARAMETERS: HCPCS | Performed by: OBSTETRICS & GYNECOLOGY

## 2022-04-18 PROCEDURE — G8427 DOCREV CUR MEDS BY ELIG CLIN: HCPCS | Performed by: OBSTETRICS & GYNECOLOGY

## 2022-04-18 PROCEDURE — 4004F PT TOBACCO SCREEN RCVD TLK: CPT | Performed by: OBSTETRICS & GYNECOLOGY

## 2022-04-18 NOTE — PROGRESS NOTES
PROBLEM VISIT     Date of service: 2022    Joana Mast  Is a 50 y.o.  female    PT's PCP is: KENDALL Jimenez - [facility]     : 1975                                             Subjective:       Patient's last menstrual period was 2008. OB History    Para Term  AB Living   3 3 3     3   SAB IAB Ectopic Molar Multiple Live Births             3      # Outcome Date GA Lbr Andres/2nd Weight Sex Delivery Anes PTL Lv   3 Term 99 40w0d   F Vag-Spont   LUANN   2 Term 11/15/95 40w0d   M Vag-Spont   LUANN   1 Term 93 40w0d   M Vag-Spont   LUANN        Social History     Tobacco Use   Smoking Status Current Every Day Smoker    Packs/day: 0.50    Years: 11.00    Pack years: 5.50    Types: Cigarettes   Smokeless Tobacco Never Used        Social History     Substance and Sexual Activity   Alcohol Use No       Social History     Substance and Sexual Activity   Sexual Activity Yes    Partners: Male    Comment: pt has had a hysterectomy       Allergies: Codeine    Chief Complaint   Patient presents with    Follow-up     patient presents today to discuss vulvar bx results from 22. Last Yearly:  22    Last pap: 22    Last HPV: unknown      NURSE: CAROLINA    PE:  Vital Signs  Blood pressure 110/64, height 5' 5\" (1.651 m), weight 140 lb (63.5 kg), last menstrual period 2008, not currently breastfeeding. Labs:    No results found for this visit on 22. NURSE: Clark    HPI: The patient is here following a recent vulvar biopsy. She is recovering well from this minor office procedure    No  PT denies fever, chills, nausea and vomiting       Objective: The abnormal area is just distal to the posterior fourchette at 5:00. It is somewhat less than the size of a dime overall                           Assessment and Plan: Because of the findings of LAYLA 3 I am going to need to make a wide local excision. We will schedule this in the OR.   I did talk to her about the risks of scarring and infection          Diagnosis Orders   1. LAYLA III (vulvar intraepithelial neoplasia III)               No follow-ups on file. FF: 20 minutes    There are no Patient Instructions on file for this visit. Over 75%of time spent on counseling and care coordination on: see assessment and plan,  She was also counseled on her preventative health maintenance recommendations and follow-up.       Juan Mullen MD,4/18/2022 4:34 PM

## 2022-04-19 ENCOUNTER — HOSPITAL ENCOUNTER (OUTPATIENT)
Dept: WOMENS IMAGING | Age: 47
Discharge: HOME | End: 2022-04-21
Payer: MEDICARE

## 2022-04-19 ENCOUNTER — TELEPHONE (OUTPATIENT)
Dept: PRIMARY CARE CLINIC | Age: 47
End: 2022-04-19

## 2022-04-19 DIAGNOSIS — Z12.31 OTHER SCREENING MAMMOGRAM: ICD-10-CM

## 2022-04-19 LAB — CYTOLOGY REPORT: NORMAL

## 2022-04-19 PROCEDURE — 77063 BREAST TOMOSYNTHESIS BI: CPT

## 2022-04-19 NOTE — TELEPHONE ENCOUNTER
----- Message from KENDALL Mckinney CNP sent at 4/19/2022 12:13 PM EDT -----  Please notify patient of normal mammogram results.   Thanks Malena Guzman

## 2022-04-22 DIAGNOSIS — B37.31 YEAST INFECTION OF THE VAGINA: Primary | ICD-10-CM

## 2022-04-22 LAB
HPV SAMPLE: ABNORMAL
HPV, GENOTYPE 16: NOT DETECTED
HPV, GENOTYPE 18: NOT DETECTED
HPV, HIGH RISK OTHER: DETECTED
HPV, INTERPRETATION: ABNORMAL
SPECIMEN DESCRIPTION: ABNORMAL

## 2022-04-22 RX ORDER — FLUCONAZOLE 150 MG/1
150 TABLET ORAL ONCE
Qty: 1 TABLET | Refills: 0 | Status: SHIPPED | OUTPATIENT
Start: 2022-04-22 | End: 2022-04-22

## 2022-04-24 DIAGNOSIS — F43.9 STRESS: ICD-10-CM

## 2022-04-25 RX ORDER — CLONAZEPAM 1 MG/1
1 TABLET ORAL 2 TIMES DAILY PRN
Qty: 60 TABLET | Refills: 0 | Status: SHIPPED | OUTPATIENT
Start: 2022-04-25 | End: 2022-06-09 | Stop reason: SDUPTHER

## 2022-04-25 NOTE — TELEPHONE ENCOUNTER
Health Maintenance   Topic Date Due    Pneumococcal 0-64 years Vaccine (2 - PCV) 08/10/2018    COVID-19 Vaccine (1) 08/04/2022 (Originally 4/10/1978)    Flu vaccine (Season Ended) 10/07/2022 (Originally 9/1/2022)    DTaP/Tdap/Td vaccine (1 - Tdap) 02/08/2023 (Originally 4/10/1992)    Hepatitis C screen  02/08/2023 (Originally 4/10/1991)    HIV screen  02/08/2023 (Originally 4/10/1988)    Lipids  08/02/2022    Depression Monitoring  04/05/2023    Colorectal Cancer Screen  04/12/2023    Hepatitis A vaccine  Aged Out    Hepatitis B vaccine  Aged Out    Hib vaccine  Aged Out    Meningococcal (ACWY) vaccine  Aged Out             (applicable per patient's age: Cancer Screenings, Depression Screening, Fall Risk Screening, Immunizations)    Microalb/Crt.  Ratio (mcg/mg creat)   Date Value   09/19/2017 16     LDL Cholesterol (mg/dL)   Date Value   08/02/2021 157 (H)     AST (U/L)   Date Value   08/02/2021 15     ALT (U/L)   Date Value   08/02/2021 7     BUN (mg/dL)   Date Value   08/02/2021 6      (goal A1C is < 7)   (goal LDL is <100) need 30-50% reduction from baseline     BP Readings from Last 3 Encounters:   04/18/22 110/64   04/05/22 102/70   02/08/22 120/78    (goal /80)      All Future Testing planned in [facility]:  Lab Frequency Next Occurrence   CBC Auto Differential Once 08/07/2022   ALT Once 08/08/2022   AST Once 63/74/6570   Basic Metabolic Panel Once 30/63/6166   Hemoglobin A1C Once 08/07/2022   Lipid Panel Once 08/07/2022       Next Visit Date:  Future Appointments   Date Time Provider Claudio Baeza   8/10/2022  2:00 PM Rosalind Sanchez, APRN - CNP [facility]            Patient Active Problem List:     Insomnia     Depression     Anxiety associated with depression     Tobacco abuse     Dyslipidemia     Dysphagia     Family history of throat cancer     Gastroesophageal reflux disease     H/O: hysterectomy     Mild intermittent asthma without complication     Condylomata acuminata in female     Stress

## 2022-05-05 ENCOUNTER — HOSPITAL ENCOUNTER (EMERGENCY)
Age: 47
Discharge: HOME | End: 2022-05-05
Payer: MEDICARE

## 2022-05-05 ENCOUNTER — APPOINTMENT (OUTPATIENT)
Dept: GENERAL RADIOLOGY | Age: 47
End: 2022-05-05
Payer: MEDICARE

## 2022-05-05 VITALS
OXYGEN SATURATION: 99 % | RESPIRATION RATE: 14 BRPM | DIASTOLIC BLOOD PRESSURE: 66 MMHG | HEART RATE: 80 BPM | TEMPERATURE: 98.8 F | SYSTOLIC BLOOD PRESSURE: 110 MMHG

## 2022-05-05 DIAGNOSIS — S90.31XA CONTUSION OF RIGHT FOOT, INITIAL ENCOUNTER: Primary | ICD-10-CM

## 2022-05-05 DIAGNOSIS — S93.601A SPRAIN OF RIGHT FOOT, INITIAL ENCOUNTER: ICD-10-CM

## 2022-05-05 PROCEDURE — 99283 EMERGENCY DEPT VISIT LOW MDM: CPT

## 2022-05-05 PROCEDURE — 6370000000 HC RX 637 (ALT 250 FOR IP): Performed by: PHYSICIAN ASSISTANT

## 2022-05-05 PROCEDURE — 73630 X-RAY EXAM OF FOOT: CPT

## 2022-05-05 RX ORDER — IBUPROFEN 400 MG/1
400 TABLET ORAL ONCE
Status: COMPLETED | OUTPATIENT
Start: 2022-05-05 | End: 2022-05-05

## 2022-05-05 RX ADMIN — IBUPROFEN 400 MG: 400 TABLET, FILM COATED ORAL at 21:40

## 2022-05-05 ASSESSMENT — PAIN SCALES - GENERAL: PAINLEVEL_OUTOF10: 7

## 2022-05-05 ASSESSMENT — PAIN - FUNCTIONAL ASSESSMENT: PAIN_FUNCTIONAL_ASSESSMENT: 0-10

## 2022-05-05 ASSESSMENT — PAIN DESCRIPTION - ORIENTATION: ORIENTATION: RIGHT

## 2022-05-05 ASSESSMENT — ENCOUNTER SYMPTOMS
EYES NEGATIVE: 1
GASTROINTESTINAL NEGATIVE: 1
RESPIRATORY NEGATIVE: 1

## 2022-05-05 ASSESSMENT — PAIN DESCRIPTION - LOCATION: LOCATION: FOOT

## 2022-05-06 NOTE — ED PROVIDER NOTES
[facility] ED  EMERGENCY DEPARTMENT ENCOUNTER      Pt Name: Kalani Gonzalez  MRN: [id number]  [place] 1975  Date of evaluation: 5/5/2022  Provider: Shy Kelly. [address]       Chief Complaint   Patient presents with    Foot Pain     right foot pain started this evening         HISTORY OF PRESENT ILLNESS    Barb Beal is a 50 y.o. female who presents to the emergency department with complaint of right foot pain. She states her boyfriend stepped on her right foot today. She complains of pain more on the top of her mid foot. The pain is worse with movement and walking. Pain is relieved with rest.  Her pain is mild to moderate. She denies any other injuries. There is no radiation of symptoms. Nursing Notes were reviewed. REVIEW OF SYSTEMS    (2-9 systems for level 4, 10 or more for level 5)     Review of Systems   Constitutional: Negative. HENT: Negative. Eyes: Negative. Respiratory: Negative. Cardiovascular: Negative. Gastrointestinal: Negative. Endocrine: Negative. Genitourinary: Negative. Musculoskeletal: Positive for arthralgias. Neurological: Negative. Hematological: Negative. Psychiatric/Behavioral: Negative. All other systems reviewed and are negative. Except as noted above the remainder of the review of systems was reviewed and negative.        PAST MEDICAL HISTORY     Past Medical History:   Diagnosis Date    Collapsed lung     Depression     Headache(784.0)     Hypotension     Spontaneous pneumothorax     Vasodepressor syncope          SURGICAL HISTORY       Past Surgical History:   Procedure Laterality Date    CERVIX SURGERY N/A 3/17/2021    CERVIX ABLATION LASER-CO2 VAPORIZATION OF GENITAL CONDYLOMA performed by Zuleyma Beck MD at [place] [id number]  03/17/2021    genital    HYSTERECTOMY      TUBAL LIGATION           CURRENT MEDICATIONS       Previous Medications ALBUTEROL SULFATE HFA (VENTOLIN HFA) 108 (90 BASE) MCG/ACT INHALER    Inhale 2 puffs into the lungs every 6 hours as needed for Wheezing    ARIPIPRAZOLE (ABILIFY) 10 MG TABLET    Take 1 tablet by mouth daily    ATORVASTATIN (LIPITOR) 20 MG TABLET    take 1 tablet by mouth once daily    CLONAZEPAM (KLONOPIN) 1 MG TABLET    Take 1 tablet by mouth 2 times daily as needed for Anxiety for up to 30 days.     DICYCLOMINE (BENTYL) 20 MG TABLET    Take 1 tablet by mouth 3 times daily as needed (cramping)    DULOXETINE (CYMBALTA) 30 MG EXTENDED RELEASE CAPSULE    Take 1 capsule by mouth daily    DULOXETINE (CYMBALTA) 60 MG EXTENDED RELEASE CAPSULE    Take 1 capsule by mouth daily    ESTROGENS, CONJUGATED, (PREMARIN) 0.625 MG TABLET    Take 1 tablet by mouth daily    IBUPROFEN (ADVIL;MOTRIN) 800 MG TABLET    Take 1 tablet by mouth 3 times daily as needed for Pain    NICOTINE (NICODERM CQ) 21 MG/24HR    Place 1 patch onto the skin every 24 hours    QUETIAPINE (SEROQUEL XR) 50 MG EXTENDED RELEASE TABLET    Take 1 tablet by mouth nightly       ALLERGIES     Codeine    FAMILY HISTORY       Family History   Problem Relation Age of Onset    Diabetes Mother     Thyroid Disease Mother     Arthritis Mother     Cancer Father     Stroke Father     Asthma Father     Emphysema Father     Seizures Sister     Diabetes Sister     No Known Problems Maternal Grandmother     No Known Problems Maternal Grandfather     No Known Problems Paternal Grandmother     No Known Problems Paternal Grandfather     Other Other         no family h/o breast or ovarian cancer           SOCIAL HISTORY       Social History     Socioeconomic History    Marital status: Legally      Spouse name: Jodie Sharma    Number of children: 3    Years of education: 6    Highest education level: 11th grade   Occupational History    None   Tobacco Use    Smoking status: Current Every Day Smoker     Packs/day: 0.50     Years: 11.00     Pack years: 5.50     Types: Cigarettes    Smokeless tobacco: Never Used   Vaping Use    Vaping Use: Never used   Substance and Sexual Activity    Alcohol use: No    Drug use: No    Sexual activity: Yes     Partners: Male     Comment: pt has had a hysterectomy   Other Topics Concern    None   Social History Narrative    None     Social Determinants of Health     Financial Resource Strain: Low Risk     Difficulty of Paying Living Expenses: Not hard at all   Food Insecurity: No Food Insecurity    Worried About Running Out of Food in the Last Year: Never true    Halley of Food in the Last Year: Never true   Transportation Needs:     Lack of Transportation (Medical): Not on file    Lack of Transportation (Non-Medical):  Not on file   Physical Activity:     Days of Exercise per Week: Not on file    Minutes of Exercise per Session: Not on file   Stress:     Feeling of Stress : Not on file   Social Connections:     Frequency of Communication with Friends and Family: Not on file    Frequency of Social Gatherings with Friends and Family: Not on file    Attends Alevism Services: Not on file    Active Member of [address] [place] [address] or Organizations: Not on file    Attends Club or Organization Meetings: Not on file    Marital Status: Not on file   Intimate Partner Violence:     Fear of Current or Ex-Partner: Not on file    Emotionally Abused: Not on file    Physically Abused: Not on file    Sexually Abused: Not on file   Housing Stability:     Unable to Pay for Housing in the Last Year: Not on file    Number of [place] in the Last Year: Not on file    Unstable Housing in the Last Year: Not on file       SCREENINGS         [place] Coma Scale  Eye Opening: Spontaneous  Best Verbal Response: Oriented  Best Motor Response: Obeys commands  [place] Coma Scale Score: 15                     CIWA Assessment  BP: 110/66  Pulse: 80                 PHYSICAL EXAM    (up to 7 for level 4, 8 or more for level 5)     ED Triage Vitals [05/05/22 2024]   BP Temp Temp Source Pulse Resp SpO2 Height Weight   110/66 98.8 °F (37.1 °C) Tympanic 80 14 99 % -- --       Physical Exam  Vitals and nursing note reviewed. Constitutional:       Appearance: Normal appearance. HENT:      Head: Normocephalic and atraumatic. Right Ear: External ear normal.      Left Ear: External ear normal.      Nose: Nose normal.   Eyes:      Extraocular Movements: Extraocular movements intact. Pupils: Pupils are equal, round, and reactive to light. Cardiovascular:      Rate and Rhythm: Normal rate and regular rhythm. Pulmonary:      Effort: Pulmonary effort is normal.      Breath sounds: Normal breath sounds. Abdominal:      General: Abdomen is flat. Musculoskeletal:         General: Tenderness present. No swelling or deformity. Cervical back: Normal range of motion and neck supple. Comments: Tenderness on dorsal mid right foot. No tenderness to base of fifth metatarsal.  Achilles tendon intact. Full range of motion right ankle. Skin:     Findings: No bruising. Neurological:      General: No focal deficit present. Mental Status: She is alert and oriented to person, place, and time. Sensory: No sensory deficit.    Psychiatric:         Mood and Affect: Mood normal.         Behavior: Behavior normal.         DIAGNOSTIC RESULTS     EKG: All EKG's are interpreted by the Emergency Department Physician who either signs or Co-signs this chart in the absence of a cardiologist.      RADIOLOGY:   Non-plain film images such as CT, Ultrasound and MRI are read by the radiologist. Plain radiographic images are visualized and preliminarily interpreted by the emergency physician with the below findings:      Interpretation per the Radiologist below, if available at the time of this note:    XR FOOT RIGHT (MIN 3 VIEWS)   Final Result   No acute bony abnormalities are noted               LABS:  Labs Reviewed - No data to display    All other labs were within normal range or not returned as of this dictation. EMERGENCY DEPARTMENT COURSE and DIFFERENTIAL DIAGNOSIS/MDM:   Vitals:    Vitals:    05/05/22 2024   BP: 110/66   Pulse: 80   Resp: 14   Temp: 98.8 °F (37.1 °C)   TempSrc: Tympanic   SpO2: 99%         MDM  Number of Diagnoses or Management Options  Contusion of right foot, initial encounter  Diagnosis management comments: Patient is a 70-year-old female with right foot pain on dorsal surface on mid metatarsals. No evidence of ecchymosis or edema. Patient had full range of motion right ankle. X-ray of the right foot revealed no acute process. Suspect underlying foot sprain/contusion from boyfriend stepping on her right foot. She was given postop shoe. Neurovascular intact post application of shoe. Patient also was given ibuprofen with good pain relief. Patient was to continue with ibuprofen over-the-counter 3 times daily as needed. Patient was to ice, elevate, and rest right foot. Patient was discharged home in stable condition. REASSESSMENT          CONSULTS:  None    PROCEDURES:  Unless otherwise noted below, none     Procedures      FINAL IMPRESSION      1. Contusion of right foot, initial encounter    2. Sprain of right foot, initial encounter          DISPOSITION/PLAN   DISPOSITION Decision To Discharge 05/05/2022 09:21:52 PM      PATIENT REFERRED TO:  KENDALL Giraldo CNP  [facility] [address]  [phone]    Schedule an appointment as soon as possible for a visit in 1 week        DISCHARGE MEDICATIONS:  New Prescriptions    No medications on file     Controlled Substances Monitoring:     RX Monitoring 2/1/2022   Periodic Controlled Substance Monitoring No signs of potential drug abuse or diversion identified. (Please note that portions of this note were completed with a voice recognition program.  Efforts were made to edit the dictations but occasionally words are mis-transcribed. Shivani Catherine PA-C (electronically signed)           Christiano Murrell PA-C  05/05/22 [id number]

## 2022-05-09 ENCOUNTER — TELEPHONE (OUTPATIENT)
Dept: PRIMARY CARE CLINIC | Age: 47
End: 2022-05-09

## 2022-05-09 NOTE — TELEPHONE ENCOUNTER
[facility] Transitions Initial Follow Up Call    Outreach made within 2 business days of discharge: Yes    Patient: Victoria Jasso Patient : 1975   MRN: [id number]  Reason for Admission: There are no discharge diagnoses documented for the most recent discharge. Discharge Date: 22       Spoke with: Anselmo Jones     Discharge department/facility: [facility]     TCM Interactive Patient Contact:  Was patient able to fill all prescriptions: No: none sent home   Was patient instructed to bring all medications to the follow-up visit: No: none sent home   Is patient taking all medications as directed in the discharge summary?  None sent home   Does patient understand their discharge instructions: Yes  Does patient have questions or concerns that need addressed prior to 7-14 day follow up office visit: yes - appointment made     Scheduled appointment with PCP within 7-14 days    Follow Up  Future Appointments   Date Time Provider Claudio Baeza   8/10/2022  2:00 PM Sheri Sanchez APRN - CNP [facility] [address], [place]

## 2022-05-12 ENCOUNTER — OFFICE VISIT (OUTPATIENT)
Dept: PRIMARY CARE CLINIC | Age: 47
End: 2022-05-12
Payer: MEDICARE

## 2022-05-12 VITALS
HEART RATE: 68 BPM | SYSTOLIC BLOOD PRESSURE: 104 MMHG | HEIGHT: 65 IN | RESPIRATION RATE: 18 BRPM | OXYGEN SATURATION: 98 % | WEIGHT: 144 LBS | DIASTOLIC BLOOD PRESSURE: 68 MMHG | TEMPERATURE: 97.6 F | BODY MASS INDEX: 23.99 KG/M2

## 2022-05-12 DIAGNOSIS — F43.9 STRESS: ICD-10-CM

## 2022-05-12 DIAGNOSIS — M79.671 RIGHT FOOT PAIN: Primary | ICD-10-CM

## 2022-05-12 DIAGNOSIS — Z13.31 POSITIVE DEPRESSION SCREENING: ICD-10-CM

## 2022-05-12 PROCEDURE — 4004F PT TOBACCO SCREEN RCVD TLK: CPT | Performed by: NURSE PRACTITIONER

## 2022-05-12 PROCEDURE — 99214 OFFICE O/P EST MOD 30 MIN: CPT | Performed by: NURSE PRACTITIONER

## 2022-05-12 PROCEDURE — G8427 DOCREV CUR MEDS BY ELIG CLIN: HCPCS | Performed by: NURSE PRACTITIONER

## 2022-05-12 PROCEDURE — G8420 CALC BMI NORM PARAMETERS: HCPCS | Performed by: NURSE PRACTITIONER

## 2022-05-12 RX ORDER — QUETIAPINE 150 MG/1
150 TABLET, FILM COATED, EXTENDED RELEASE ORAL NIGHTLY
Qty: 90 TABLET | Refills: 3 | Status: SHIPPED | OUTPATIENT
Start: 2022-05-12 | End: 2022-08-10 | Stop reason: SDUPTHER

## 2022-05-12 RX ORDER — IBUPROFEN 800 MG/1
800 TABLET ORAL 3 TIMES DAILY PRN
Qty: 30 TABLET | Refills: 0 | Status: SHIPPED | OUTPATIENT
Start: 2022-05-12 | End: 2022-08-10

## 2022-05-12 ASSESSMENT — PATIENT HEALTH QUESTIONNAIRE - PHQ9
9. THOUGHTS THAT YOU WOULD BE BETTER OFF DEAD, OR OF HURTING YOURSELF: 0
10. IF YOU CHECKED OFF ANY PROBLEMS, HOW DIFFICULT HAVE THESE PROBLEMS MADE IT FOR YOU TO DO YOUR WORK, TAKE CARE OF THINGS AT HOME, OR GET ALONG WITH OTHER PEOPLE: 1
1. LITTLE INTEREST OR PLEASURE IN DOING THINGS: 1
SUM OF ALL RESPONSES TO PHQ QUESTIONS 1-9: 16
3. TROUBLE FALLING OR STAYING ASLEEP: 3
6. FEELING BAD ABOUT YOURSELF - OR THAT YOU ARE A FAILURE OR HAVE LET YOURSELF OR YOUR FAMILY DOWN: 2
SUM OF ALL RESPONSES TO PHQ9 QUESTIONS 1 & 2: 1
SUM OF ALL RESPONSES TO PHQ QUESTIONS 1-9: 16
2. FEELING DOWN, DEPRESSED OR HOPELESS: 0
4. FEELING TIRED OR HAVING LITTLE ENERGY: 3
5. POOR APPETITE OR OVEREATING: 3
SUM OF ALL RESPONSES TO PHQ QUESTIONS 1-9: 16
8. MOVING OR SPEAKING SO SLOWLY THAT OTHER PEOPLE COULD HAVE NOTICED. OR THE OPPOSITE, BEING SO FIGETY OR RESTLESS THAT YOU HAVE BEEN MOVING AROUND A LOT MORE THAN USUAL: 3
SUM OF ALL RESPONSES TO PHQ QUESTIONS 1-9: 16
7. TROUBLE CONCENTRATING ON THINGS, SUCH AS READING THE NEWSPAPER OR WATCHING TELEVISION: 1

## 2022-05-12 ASSESSMENT — ENCOUNTER SYMPTOMS
COUGH: 0
WHEEZING: 0
CONSTIPATION: 0
SHORTNESS OF BREATH: 0
DIARRHEA: 0
NAUSEA: 0
VOMITING: 0
RHINORRHEA: 0
SORE THROAT: 0
VISUAL CHANGE: 0
ABDOMINAL PAIN: 0

## 2022-05-12 NOTE — PATIENT INSTRUCTIONS
SURVEY:     You may be receiving a survey from [employer] regarding your visit today. Please complete the survey to enable us to provide the highest quality of care to you and your family. If you cannot score us a very good on any question, please call the office to discuss how we could have made your experience a very good one. Thank you.   Sugar Sanchez, APRN-GOYO Dela Cruz, CNP  Erendira Patten, ZEENATN  Sami Harman, CMA  Christiano Locke, CMA  Shazia, CMA  Nakia, PCA

## 2022-05-12 NOTE — PROGRESS NOTES
Name: Skylar Alvarenga  : 1975         Chief Complaint:     Chief Complaint   Patient presents with    ED Follow-up     right foot swelling and pain -pt states didnt fall or anything        History of Present Illness:      Skylar Alvarenga is a 50 y.o.  female who presents with ED Follow-up (right foot swelling and pain -pt states didnt fall or anything )      Barb is here today for an ER follow up visit. ER course:      Skylar Alvarenga is a 50 y.o. female who presents to the emergency department with complaint of right foot pain. She states her boyfriend stepped on her right foot today. She complains of pain more on the top of her mid foot. The pain is worse with movement and walking. Pain is relieved with rest.  Her pain is mild to moderate. She denies any other injuries. There is no radiation of symptoms.       MDM  Number of Diagnoses or Management Options  Contusion of right foot, initial encounter  Diagnosis management comments: Patient is a 68-year-old female with right foot pain on dorsal surface on mid metatarsals. No evidence of ecchymosis or edema. Patient had full range of motion right ankle. X-ray of the right foot revealed no acute process. Suspect underlying foot sprain/contusion from boyfriend stepping on her right foot. She was given postop shoe. Neurovascular intact post application of shoe. Patient also was given ibuprofen with good pain relief. Patient was to continue with ibuprofen over-the-counter 3 times daily as needed. Patient was to ice, elevate, and rest right foot. Patient was discharged home in stable condition. UPDATE 2022-states her foot is somewhat better. She still having some residual tenderness to palpation. I am wondering if this could be of a gout flare or just residual contusion. We will obtain uric acid and follow with result. Dysthymia/nervousness-worsening anxiety. Patient is having trouble sleeping. Lots of \"stuff\" going on at home.   See below for further comment. Mental Health Problem  The primary symptoms include dysphoric mood and negative symptoms. The primary symptoms do not include delusions, hallucinations, bizarre behavior, disorganized speech or somatic symptoms. Primary symptoms comment: NERVOUSNESS. The current episode started more than 1 month ago. This is a chronic problem. The dysphoric mood began more than 2 weeks ago. The mood has been worsening since its onset. She characterizes the problem as moderate. The mood includes feelings of irritability. Her change in mood was precipitated by a stressful event. The onset of the illness is precipitated by emotional stress. The degree of incapacity that she is experiencing as a consequence of her illness is moderate. Sequelae of the illness include harmed interpersonal relations. Additional symptoms of the illness include anhedonia, insomnia, attention impairment and distractible. Additional symptoms of the illness do not include hypersomnia, appetite change, unexpected weight change, fatigue, agitation, psychomotor retardation, feelings of worthlessness, euphoric mood, increased goal-directed activity, flight of ideas, inflated self-esteem, decreased need for sleep, poor judgment, visual change, headaches, abdominal pain or seizures. She does not admit to suicidal ideas. She does not have a plan to attempt suicide. She does not contemplate harming herself. She has not already injured self. She does not contemplate injuring another person. She has not already  injured another person. Risk factors that are present for mental illness include a history of mental illness.          Past Medical History:     Past Medical History:   Diagnosis Date    Collapsed lung     Depression     Headache(784.0)     Hypotension     Spontaneous pneumothorax     Vasodepressor syncope       Reviewed all health maintenance requirements and ordered appropriate tests  There are no preventive care reminders to display for this patient. Past Surgical History:     Past Surgical History:   Procedure Laterality Date    CERVIX SURGERY N/A 3/17/2021    CERVIX ABLATION LASER-CO2 VAPORIZATION OF GENITAL CONDYLOMA performed by Colette Villa MD at [place] [address]  03/17/2021    genital    HYSTERECTOMY      TUBAL LIGATION          Medications:       Prior to Admission medications    Medication Sig Start Date End Date Taking? Authorizing Provider   ibuprofen (ADVIL;MOTRIN) 800 MG tablet Take 1 tablet by mouth 3 times daily as needed for Pain 5/12/22 6/12/22 Yes KENDALL Paniagua CNP   QUEtiapine (SEROQUEL XR) 150 MG TB24 extended release tablet Take 1 tablet by mouth nightly 5/12/22  Yes KENDALL Paniagua CNP   clonazePAM (KLONOPIN) 1 MG tablet Take 1 tablet by mouth 2 times daily as needed for Anxiety for up to 30 days.  4/25/22 5/25/22 Yes KENDALL Paniagua CNP   estrogens, conjugated, (PREMARIN) 0.625 MG tablet Take 1 tablet by mouth daily 4/7/22  Yes Colette Villa MD   albuterol sulfate HFA (VENTOLIN HFA) 108 (90 Base) MCG/ACT inhaler Inhale 2 puffs into the lungs every 6 hours as needed for Wheezing 2/1/22  Yes KENDALL Paniagua CNP   DULoxetine (CYMBALTA) 60 MG extended release capsule Take 1 capsule by mouth daily 2/1/22  Yes KENDALL Paniagua - CNP   DULoxetine (CYMBALTA) 30 MG extended release capsule Take 1 capsule by mouth daily 2/1/22  Yes KENDALL Paniagua CNP   ARIPiprazole (ABILIFY) 10 MG tablet Take 1 tablet by mouth daily 2/1/22  Yes KENDALL Paniagua CNP   atorvastatin (LIPITOR) 20 MG tablet take 1 tablet by mouth once daily 2/1/22  Yes KENDALL Paniagua CNP   nicotine (NICODERM CQ) 21 MG/24HR Place 1 patch onto the skin every 24 hours 2/1/22  Yes KENDALL Paniagua CNP   dicyclomine (BENTYL) 20 MG tablet Take 1 tablet by mouth 3 times daily as needed (cramping) 11/9/21  Yes Belita Salamanca Might, APRN - CNP        Allergies: Codeine    Social History:     Tobacco:    reports that she has been smoking cigarettes. She has a 5.50 pack-year smoking history. She has never used smokeless tobacco.  Alcohol:      reports no history of alcohol use. Drug Use:  reports no history of drug use. Family History:     Family History   Problem Relation Age of Onset    Diabetes Mother     Thyroid Disease Mother     Arthritis Mother     Cancer Father     Stroke Father     Asthma Father     Emphysema Father     Seizures Sister     Diabetes Sister     No Known Problems Maternal Grandmother     No Known Problems Maternal Grandfather     No Known Problems Paternal Grandmother     No Known Problems Paternal Grandfather     Other Other         no family h/o breast or ovarian cancer        Review of Systems:     Positive and Negative as described in HPI    Review of Systems   Constitutional: Negative for appetite change, chills, fatigue, fever and unexpected weight change. HENT: Negative for congestion, rhinorrhea and sore throat. Eyes: Negative for visual disturbance. Respiratory: Negative for cough, shortness of breath and wheezing. Cardiovascular: Negative for chest pain and palpitations. Gastrointestinal: Negative for abdominal pain, constipation, diarrhea, nausea and vomiting. Genitourinary: Negative for difficulty urinating and dysuria. Musculoskeletal: Positive for arthralgias and gait problem. Negative for neck pain and neck stiffness. Skin: Negative for rash. Neurological: Negative for dizziness, seizures, syncope, light-headedness and headaches. Psychiatric/Behavioral: Positive for decreased concentration, dysphoric mood and sleep disturbance. Negative for agitation, behavioral problems, confusion, hallucinations, self-injury and suicidal ideas. The patient is nervous/anxious and has insomnia. The patient is not hyperactive.         Physical Exam:   Vitals:  /68 (Site: Left Upper Arm, Position: Sitting) Pulse 68   Temp 97.6 °F (36.4 °C) (Temporal)   Resp 18   Ht 5' 5\" (1.651 m)   Wt 144 lb (65.3 kg)   LMP 04/07/2008   SpO2 98%   BMI 23.96 kg/m²     Physical Exam  Vitals and nursing note reviewed. Constitutional:       General: She is not in acute distress. Appearance: Normal appearance. She is not ill-appearing. HENT:      Mouth/Throat:      Mouth: Mucous membranes are moist.   Eyes:      General: No scleral icterus. Conjunctiva/sclera: Conjunctivae normal.   Cardiovascular:      Rate and Rhythm: Normal rate and regular rhythm. Pulses: Normal pulses. Dorsalis pedis pulses are 2+ on the right side. Heart sounds: No murmur heard. Pulmonary:      Effort: Pulmonary effort is normal.      Breath sounds: Normal breath sounds. No wheezing. Musculoskeletal:      Cervical back: Normal range of motion and neck supple. Right lower leg: No edema. Left lower leg: No edema. Feet:    Feet:      Right foot:      Skin integrity: Skin integrity normal.   Skin:     General: Skin is warm and dry. Findings: No rash. Neurological:      Mental Status: She is alert and oriented to person, place, and time. Psychiatric:         Attention and Perception: Attention normal.         Mood and Affect: Affect normal. Mood is anxious. Mood is not depressed. Speech: Speech normal.         Behavior: Behavior normal. Behavior is cooperative. Thought Content: Thought content normal. Thought content does not include homicidal or suicidal ideation. Thought content does not include homicidal or suicidal plan.          Cognition and Memory: Cognition normal.         Judgment: Judgment normal.         Data:     Lab Results   Component Value Date     08/02/2021    K 4.4 08/02/2021     08/02/2021    CO2 23 08/02/2021    BUN 6 08/02/2021    CREATININE 0.89 08/02/2021    GLUCOSE 91 08/02/2021    PROT 7.3 09/19/2017    LABALBU 4.2 09/19/2017    BILITOT 0.54 09/19/2017    ALKPHOS 101 09/19/2017    AST 15 08/02/2021    ALT 7 08/02/2021     Lab Results   Component Value Date    WBC 10.1 08/02/2021    RBC 4.28 08/02/2021    HGB 13.6 08/02/2021    HCT 41.5 08/02/2021    MCV 97.0 08/02/2021    MCH 31.8 08/02/2021    MCHC 32.8 08/02/2021    RDW 13.1 08/02/2021     08/02/2021    MPV 9.5 08/02/2021     Lab Results   Component Value Date    TSH 2.94 02/10/2021     Lab Results   Component Value Date    CHOL 227 08/02/2021    HDL 42 08/02/2021       Assessment/Plan:      Diagnosis Orders   1. Right foot pain  Uric Acid   2. Stress  QUEtiapine (SEROQUEL XR) 150 MG TB24 extended release tablet   3. Positive depression screening       X-ray was reviewed and found to be normal.  We will obtain uric acid and follow with result. Increase quetiapine XR to 150 mg nightly. Call in a few weeks with progress. 1.  Barb received counseling on the following healthy behaviors: nutrition, exercise and medication adherence  2. Patient given educational materials - see patient instructions  3. Was a self-tracking handout given in paper form or via [employer]? No  If yes, see orders or list here. 4.  Discussed use, benefit, and side effects of prescribed medications. Barriers to medication compliance addressed. All patient questions answered. Pt voiced understanding. 5.  Reviewed prior labs and health maintenance  6. Continue current medications, diet and exercise. Completed Refills   Requested Prescriptions     Signed Prescriptions Disp Refills    ibuprofen (ADVIL;MOTRIN) 800 MG tablet 30 tablet 0     Sig: Take 1 tablet by mouth 3 times daily as needed for Pain    QUEtiapine (SEROQUEL XR) 150 MG TB24 extended release tablet 90 tablet 3     Sig: Take 1 tablet by mouth nightly         Return if symptoms worsen or fail to improve.                   PHQ-9 score today: (PHQ-9 Total Score: 16), additional evaluation and assessment performed, follow-up plan includes but not limited to: Medication management and Referral to /Specialist  for evaluation and management.

## 2022-05-13 ENCOUNTER — HOSPITAL ENCOUNTER (OUTPATIENT)
Age: 47
Discharge: HOME | End: 2022-05-13
Payer: MEDICARE

## 2022-05-13 DIAGNOSIS — M79.671 RIGHT FOOT PAIN: ICD-10-CM

## 2022-05-13 LAB — URIC ACID: 3.9 MG/DL (ref 2.4–5.7)

## 2022-05-13 PROCEDURE — 84550 ASSAY OF BLOOD/URIC ACID: CPT

## 2022-05-13 PROCEDURE — 36415 COLL VENOUS BLD VENIPUNCTURE: CPT

## 2022-05-16 ENCOUNTER — TELEPHONE (OUTPATIENT)
Dept: PRIMARY CARE CLINIC | Age: 47
End: 2022-05-16

## 2022-05-16 NOTE — TELEPHONE ENCOUNTER
----- Message from KENDALL Lugo CNP sent at 5/13/2022  4:41 PM EDT -----  Results are normal, please call patient and make them aware.  No gout

## 2022-05-26 NOTE — PROGRESS NOTES
Patient instructed per phone interview on the pre-operative, intra-operative, and post-operative process as well as NPO status. Patient to take regular morning medications with sip of water only. Pre-operative instruction sheet reviewed with the patient as well as skin prep instructions. Verbalizes understanding.

## 2022-06-07 ENCOUNTER — ANESTHESIA EVENT (OUTPATIENT)
Dept: OPERATING ROOM | Age: 47
End: 2022-06-07
Payer: MEDICARE

## 2022-06-08 ENCOUNTER — HOSPITAL ENCOUNTER (OUTPATIENT)
Age: 47
Setting detail: OUTPATIENT SURGERY
Discharge: HOME | End: 2022-06-08
Attending: OBSTETRICS & GYNECOLOGY | Admitting: OBSTETRICS & GYNECOLOGY
Payer: MEDICARE

## 2022-06-08 ENCOUNTER — ANESTHESIA (OUTPATIENT)
Dept: OPERATING ROOM | Age: 47
End: 2022-06-08
Payer: MEDICARE

## 2022-06-08 VITALS
DIASTOLIC BLOOD PRESSURE: 57 MMHG | SYSTOLIC BLOOD PRESSURE: 102 MMHG | WEIGHT: 143.6 LBS | HEART RATE: 56 BPM | OXYGEN SATURATION: 98 % | HEIGHT: 65 IN | TEMPERATURE: 97.2 F | RESPIRATION RATE: 16 BRPM | BODY MASS INDEX: 23.93 KG/M2

## 2022-06-08 DIAGNOSIS — D07.1 VIN III (VULVAR INTRAEPITHELIAL NEOPLASIA III): Primary | ICD-10-CM

## 2022-06-08 PROCEDURE — 3600000012 HC SURGERY LEVEL 2 ADDTL 15MIN: Performed by: OBSTETRICS & GYNECOLOGY

## 2022-06-08 PROCEDURE — 2580000003 HC RX 258: Performed by: OBSTETRICS & GYNECOLOGY

## 2022-06-08 PROCEDURE — 6360000002 HC RX W HCPCS: Performed by: OBSTETRICS & GYNECOLOGY

## 2022-06-08 PROCEDURE — 3700000000 HC ANESTHESIA ATTENDED CARE: Performed by: OBSTETRICS & GYNECOLOGY

## 2022-06-08 PROCEDURE — 88305 TISSUE EXAM BY PATHOLOGIST: CPT

## 2022-06-08 PROCEDURE — 2709999900 HC NON-CHARGEABLE SUPPLY: Performed by: OBSTETRICS & GYNECOLOGY

## 2022-06-08 PROCEDURE — 6370000000 HC RX 637 (ALT 250 FOR IP): Performed by: NURSE ANESTHETIST, CERTIFIED REGISTERED

## 2022-06-08 PROCEDURE — 6360000002 HC RX W HCPCS: Performed by: NURSE ANESTHETIST, CERTIFIED REGISTERED

## 2022-06-08 PROCEDURE — 11423 EXC H-F-NK-SP B9+MARG 2.1-3: CPT | Performed by: OBSTETRICS & GYNECOLOGY

## 2022-06-08 PROCEDURE — 7100000000 HC PACU RECOVERY - FIRST 15 MIN: Performed by: OBSTETRICS & GYNECOLOGY

## 2022-06-08 PROCEDURE — 2500000003 HC RX 250 WO HCPCS: Performed by: OBSTETRICS & GYNECOLOGY

## 2022-06-08 PROCEDURE — 7100000010 HC PHASE II RECOVERY - FIRST 15 MIN: Performed by: OBSTETRICS & GYNECOLOGY

## 2022-06-08 PROCEDURE — 3700000001 HC ADD 15 MINUTES (ANESTHESIA): Performed by: OBSTETRICS & GYNECOLOGY

## 2022-06-08 PROCEDURE — 2580000003 HC RX 258: Performed by: NURSE ANESTHETIST, CERTIFIED REGISTERED

## 2022-06-08 PROCEDURE — 3600000002 HC SURGERY LEVEL 2 BASE: Performed by: OBSTETRICS & GYNECOLOGY

## 2022-06-08 PROCEDURE — 7100000001 HC PACU RECOVERY - ADDTL 15 MIN: Performed by: OBSTETRICS & GYNECOLOGY

## 2022-06-08 PROCEDURE — 7100000011 HC PHASE II RECOVERY - ADDTL 15 MIN: Performed by: OBSTETRICS & GYNECOLOGY

## 2022-06-08 PROCEDURE — 88342 IMHCHEM/IMCYTCHM 1ST ANTB: CPT

## 2022-06-08 RX ORDER — SODIUM CHLORIDE, SODIUM LACTATE, POTASSIUM CHLORIDE, CALCIUM CHLORIDE 600; 310; 30; 20 MG/100ML; MG/100ML; MG/100ML; MG/100ML
INJECTION, SOLUTION INTRAVENOUS CONTINUOUS
Status: DISCONTINUED | OUTPATIENT
Start: 2022-06-08 | End: 2022-06-08 | Stop reason: HOSPADM

## 2022-06-08 RX ORDER — DIMENHYDRINATE 50 MG/1
50 TABLET ORAL ONCE
Status: COMPLETED | OUTPATIENT
Start: 2022-06-08 | End: 2022-06-08

## 2022-06-08 RX ORDER — FENTANYL CITRATE 50 UG/ML
25 INJECTION, SOLUTION INTRAMUSCULAR; INTRAVENOUS EVERY 5 MIN PRN
Status: COMPLETED | OUTPATIENT
Start: 2022-06-08 | End: 2022-06-08

## 2022-06-08 RX ORDER — ONDANSETRON 2 MG/ML
4 INJECTION INTRAMUSCULAR; INTRAVENOUS EVERY 6 HOURS PRN
Status: CANCELLED | OUTPATIENT
Start: 2022-06-08

## 2022-06-08 RX ORDER — ACETAMINOPHEN 325 MG/1
650 TABLET ORAL ONCE
Status: COMPLETED | OUTPATIENT
Start: 2022-06-08 | End: 2022-06-08

## 2022-06-08 RX ORDER — SODIUM CHLORIDE 9 MG/ML
INJECTION, SOLUTION INTRAVENOUS PRN
Status: CANCELLED | OUTPATIENT
Start: 2022-06-08

## 2022-06-08 RX ORDER — ONDANSETRON 2 MG/ML
INJECTION INTRAMUSCULAR; INTRAVENOUS PRN
Status: DISCONTINUED | OUTPATIENT
Start: 2022-06-08 | End: 2022-06-08 | Stop reason: SDUPTHER

## 2022-06-08 RX ORDER — KETOROLAC TROMETHAMINE 30 MG/ML
INJECTION, SOLUTION INTRAMUSCULAR; INTRAVENOUS PRN
Status: DISCONTINUED | OUTPATIENT
Start: 2022-06-08 | End: 2022-06-08 | Stop reason: SDUPTHER

## 2022-06-08 RX ORDER — SODIUM CHLORIDE, SODIUM LACTATE, POTASSIUM CHLORIDE, CALCIUM CHLORIDE 600; 310; 30; 20 MG/100ML; MG/100ML; MG/100ML; MG/100ML
INJECTION, SOLUTION INTRAVENOUS CONTINUOUS PRN
Status: DISCONTINUED | OUTPATIENT
Start: 2022-06-08 | End: 2022-06-08 | Stop reason: SDUPTHER

## 2022-06-08 RX ORDER — SODIUM CHLORIDE 9 MG/ML
INJECTION, SOLUTION INTRAVENOUS PRN
Status: DISCONTINUED | OUTPATIENT
Start: 2022-06-08 | End: 2022-06-08 | Stop reason: HOSPADM

## 2022-06-08 RX ORDER — MUPIROCIN CALCIUM 20 MG/G
CREAM TOPICAL
Qty: 15 G | Refills: 1 | Status: SHIPPED | OUTPATIENT
Start: 2022-06-08 | End: 2022-08-10

## 2022-06-08 RX ORDER — ONDANSETRON 4 MG/1
4 TABLET, ORALLY DISINTEGRATING ORAL EVERY 8 HOURS PRN
Status: CANCELLED | OUTPATIENT
Start: 2022-06-08

## 2022-06-08 RX ORDER — SODIUM CHLORIDE, SODIUM LACTATE, POTASSIUM CHLORIDE, CALCIUM CHLORIDE 600; 310; 30; 20 MG/100ML; MG/100ML; MG/100ML; MG/100ML
INJECTION, SOLUTION INTRAVENOUS CONTINUOUS
Status: CANCELLED | OUTPATIENT
Start: 2022-06-08

## 2022-06-08 RX ORDER — SODIUM CHLORIDE 0.9 % (FLUSH) 0.9 %
5-40 SYRINGE (ML) INJECTION PRN
Status: DISCONTINUED | OUTPATIENT
Start: 2022-06-08 | End: 2022-06-08 | Stop reason: HOSPADM

## 2022-06-08 RX ORDER — SODIUM CHLORIDE 0.9 % (FLUSH) 0.9 %
5-40 SYRINGE (ML) INJECTION PRN
Status: CANCELLED | OUTPATIENT
Start: 2022-06-08

## 2022-06-08 RX ORDER — SODIUM CHLORIDE 0.9 % (FLUSH) 0.9 %
5-40 SYRINGE (ML) INJECTION EVERY 12 HOURS SCHEDULED
Status: DISCONTINUED | OUTPATIENT
Start: 2022-06-08 | End: 2022-06-08 | Stop reason: HOSPADM

## 2022-06-08 RX ORDER — DEXAMETHASONE SODIUM PHOSPHATE 4 MG/ML
INJECTION, SOLUTION INTRA-ARTICULAR; INTRALESIONAL; INTRAMUSCULAR; INTRAVENOUS; SOFT TISSUE PRN
Status: DISCONTINUED | OUTPATIENT
Start: 2022-06-08 | End: 2022-06-08 | Stop reason: SDUPTHER

## 2022-06-08 RX ORDER — HYDROCODONE BITARTRATE AND ACETAMINOPHEN 5; 325 MG/1; MG/1
1 TABLET ORAL EVERY 6 HOURS PRN
Qty: 20 TABLET | Refills: 0 | Status: SHIPPED | OUTPATIENT
Start: 2022-06-08 | End: 2022-06-13

## 2022-06-08 RX ORDER — SODIUM CHLORIDE 0.9 % (FLUSH) 0.9 %
5-40 SYRINGE (ML) INJECTION EVERY 12 HOURS SCHEDULED
Status: CANCELLED | OUTPATIENT
Start: 2022-06-08

## 2022-06-08 RX ORDER — PROPOFOL 10 MG/ML
INJECTION, EMULSION INTRAVENOUS PRN
Status: DISCONTINUED | OUTPATIENT
Start: 2022-06-08 | End: 2022-06-08 | Stop reason: SDUPTHER

## 2022-06-08 RX ORDER — LIDOCAINE HYDROCHLORIDE 10 MG/ML
INJECTION, SOLUTION EPIDURAL; INFILTRATION; INTRACAUDAL; PERINEURAL PRN
Status: DISCONTINUED | OUTPATIENT
Start: 2022-06-08 | End: 2022-06-08 | Stop reason: ALTCHOICE

## 2022-06-08 RX ADMIN — ONDANSETRON 4 MG: 2 INJECTION INTRAMUSCULAR; INTRAVENOUS at 08:01

## 2022-06-08 RX ADMIN — ACETAMINOPHEN 650 MG: 325 TABLET ORAL at 06:47

## 2022-06-08 RX ADMIN — DEXAMETHASONE SODIUM PHOSPHATE 4 MG: 4 INJECTION, SOLUTION INTRAMUSCULAR; INTRAVENOUS at 08:01

## 2022-06-08 RX ADMIN — DIMENHYDRINATE 50 MG: 50 TABLET ORAL at 06:47

## 2022-06-08 RX ADMIN — SODIUM CHLORIDE, POTASSIUM CHLORIDE, SODIUM LACTATE AND CALCIUM CHLORIDE: 600; 310; 30; 20 INJECTION, SOLUTION INTRAVENOUS at 06:46

## 2022-06-08 RX ADMIN — CEFAZOLIN 2000 MG: 10 INJECTION, POWDER, FOR SOLUTION INTRAVENOUS at 07:47

## 2022-06-08 RX ADMIN — FENTANYL CITRATE 25 MCG: 50 INJECTION, SOLUTION INTRAMUSCULAR; INTRAVENOUS at 09:00

## 2022-06-08 RX ADMIN — PROPOFOL 200 MG: 10 INJECTION, EMULSION INTRAVENOUS at 07:54

## 2022-06-08 RX ADMIN — SODIUM CHLORIDE, POTASSIUM CHLORIDE, SODIUM LACTATE AND CALCIUM CHLORIDE: 600; 310; 30; 20 INJECTION, SOLUTION INTRAVENOUS at 07:51

## 2022-06-08 RX ADMIN — KETOROLAC TROMETHAMINE 30 MG: 30 INJECTION, SOLUTION INTRAMUSCULAR at 08:31

## 2022-06-08 RX ADMIN — FENTANYL CITRATE 25 MCG: 50 INJECTION, SOLUTION INTRAMUSCULAR; INTRAVENOUS at 08:54

## 2022-06-08 ASSESSMENT — PAIN DESCRIPTION - LOCATION
LOCATION: VAGINA
LOCATION: VAGINA

## 2022-06-08 ASSESSMENT — PAIN SCALES - GENERAL
PAINLEVEL_OUTOF10: 4
PAINLEVEL_OUTOF10: 6
PAINLEVEL_OUTOF10: 2

## 2022-06-08 ASSESSMENT — PAIN DESCRIPTION - DESCRIPTORS
DESCRIPTORS: DISCOMFORT
DESCRIPTORS: DISCOMFORT

## 2022-06-08 ASSESSMENT — PAIN - FUNCTIONAL ASSESSMENT: PAIN_FUNCTIONAL_ASSESSMENT: NONE - DENIES PAIN

## 2022-06-08 ASSESSMENT — LIFESTYLE VARIABLES: SMOKING_STATUS: 1

## 2022-06-08 NOTE — ANESTHESIA POSTPROCEDURE EVALUATION
Department of Anesthesiology  Postprocedure Note    Patient: Asuncion Henriquez  MRN: [id number]  YOB: 1975  Date of evaluation: 6/8/2022  Time:  12:17 PM     Procedure Summary     Date: 06/08/22 Room / Location: [address] [place] [address]    Anesthesia Start: 4344 Anesthesia Stop: 8258    Procedure: VAGINAL BIOPSY EXCISION - WIDE LOCAL (N/A ) Diagnosis: (LAYLA III)    Surgeons: Gerardo Cooper MD Responsible Provider: KENDALL Flores CRNA    Anesthesia Type: general ASA Status: 3          Anesthesia Type: No value filed. Tariq Phase I: Tariq Score: 9    Tariq Phase II: Tariq Score: 10    Last vitals: Reviewed and per EMR flowsheets.        Anesthesia Post Evaluation    Patient location during evaluation: PACU  Patient participation: complete - patient participated  Level of consciousness: awake and alert  Pain score: 2  Airway patency: patent  Nausea & Vomiting: no nausea and no vomiting  Complications: no  Cardiovascular status: blood pressure returned to baseline  Respiratory status: acceptable and room air  Hydration status: stable  Multimodal analgesia pain management approach

## 2022-06-08 NOTE — PROGRESS NOTES
Dr. Amy Salguero at bedside. Talking with patient about surgery. Also, asked what pain medication she can take. Patient stated Vicodin.

## 2022-06-08 NOTE — PROGRESS NOTES
Patient verbalizes readiness for discharge at this time. Discharge Criteria    Inpatients must meet Criteria 1 through 7. All other patients are either YES or N/A. If a NO is chosen then Anesthesia or Surgeon must be notified. 1.  Minimum 30 minutes after last dose of sedative medication, minimum 120 minutes after last dose of reversal agent. Yes      2. Systolic BP stable within 20 mmHg for 30 minutes & systolic BP between 90 & 967 or within 10 mmHg of baseline. Yes      3. Pulse between 60 and 100 or within 10 bpm of baseline. Yes      4. Spontaneous respiratory rate >/= 10 per minute. Yes      5. SaO2 >/= 95 or  >/= baseline. Yes      6. Able to cough and swallow or return to baseline function. Yes      7. Alert and oriented or return to baseline mental status. Yes      8. Demonstrates controlled, coordinated movements, ambulates with steady gait, or return to baseline activity function. Yes      9. Minimal or no pain or nausea, or at a level tolerable and acceptable to patient. Yes      10. Takes and retains oral fluids as allowed. Yes      11. Procedural / perioperative site stable. Minimal or no bleeding. Yes          12. If GI endoscopy procedure, minimal or no abdominal distention or passing flatus. N/A      13. Written discharge instructions and emergency telephone number provided. Yes      14. Accompanied by a responsible adult.     Yes

## 2022-06-08 NOTE — ANESTHESIA PRE PROCEDURE
Department of Anesthesiology  Preprocedure Note       Name:  Janessa Graf   Age:  50 y.o.  :  1975                                          MRN:  [id number]         Date:  2022      Surgeon: Opal Leal): Winsome Collier MD    Procedure: Procedure(s):  VAGINAL BIOPSY EXCISION - WIDE LOCAL    Medications prior to admission:   Prior to Admission medications    Medication Sig Start Date End Date Taking? Authorizing Provider   ibuprofen (ADVIL;MOTRIN) 800 MG tablet Take 1 tablet by mouth 3 times daily as needed for Pain 22  KENDALL Fraser CNP   QUEtiapine (SEROQUEL XR) 150 MG TB24 extended release tablet Take 1 tablet by mouth nightly 22   KENDALL Fraser CNP   clonazePAM (KLONOPIN) 1 MG tablet Take 1 tablet by mouth 2 times daily as needed for Anxiety for up to 30 days. 22  KENDALL Fraser CNP   estrogens, conjugated, (PREMARIN) 0.625 MG tablet Take 1 tablet by mouth daily 22   Winsome Collier MD   albuterol sulfate HFA (VENTOLIN HFA) 108 (90 Base) MCG/ACT inhaler Inhale 2 puffs into the lungs every 6 hours as needed for Wheezing 22   KENDALL Fraser CNP   DULoxetine (CYMBALTA) 60 MG extended release capsule Take 1 capsule by mouth daily 22   KENDALL Fraser - CNP   DULoxetine (CYMBALTA) 30 MG extended release capsule Take 1 capsule by mouth daily 22   KENDALL Fraser CNP   ARIPiprazole (ABILIFY) 10 MG tablet Take 1 tablet by mouth daily 22   KENDALL Fraser CNP   atorvastatin (LIPITOR) 20 MG tablet take 1 tablet by mouth once daily 22   KENDALL Fraser CNP   nicotine (NICODERM CQ) 21 MG/24HR Place 1 patch onto the skin every 24 hours 22   KENDALL Fraser CNP   dicyclomine (BENTYL) 20 MG tablet Take 1 tablet by mouth 3 times daily as needed (cramping) 21   Rylee Brow Might, APRN - CNP       Current medications:    No current facility-administered medications for this visit.      No current outpatient medications on file. Facility-Administered Medications Ordered in Other Visits   Medication Dose Route Frequency Provider Last Rate Last Admin    lactated ringers infusion   IntraVENous Continuous Zuleyma Beck MD 60 mL/hr at 06/08/22 0646 New Bag at 06/08/22 0646    ceFAZolin (ANCEF) 2000 mg in dextrose 5 % 100 mL IVPB  2,000 mg IntraVENous Once Zuleyma Beck MD           Allergies: Allergies   Allergen Reactions    Codeine Itching       Problem List:    Patient Active Problem List   Diagnosis Code    Insomnia G47.00    Depression F32. A    Anxiety associated with depression F41.8    Tobacco abuse Z72.0    Dyslipidemia E78.5    Dysphagia R13.10    Family history of throat cancer Z80.0    Gastroesophageal reflux disease K21.9    H/O: hysterectomy Z90.710    Mild intermittent asthma without complication J81.16    Condylomata acuminata in female A64.0    Stress F43.9       Past Medical History:        Diagnosis Date    Collapsed lung     Depression     Headache(784.0)     Hypotension     Spontaneous pneumothorax     Vasodepressor syncope        Past Surgical History:        Procedure Laterality Date    CERVIX SURGERY N/A 3/17/2021    CERVIX ABLATION LASER-CO2 VAPORIZATION OF GENITAL CONDYLOMA performed by Zuleyma Beck MD at [place] [address]  03/17/2021    genital    HYSTERECTOMY (CERVIX STATUS UNKNOWN)      TUBAL LIGATION         Social History:    Social History     Tobacco Use    Smoking status: Current Every Day Smoker     Packs/day: 0.50     Years: 11.00     Pack years: 5.50     Types: Cigarettes    Smokeless tobacco: Never Used   Substance Use Topics    Alcohol use: No                                Ready to quit: Not Answered  Counseling given: Not Answered      Vital Signs (Current): There were no vitals filed for this visit.                                            BP Readings from Last 3 Encounters:   06/08/22 smoker          Patient smoked on day of surgery. Cardiovascular:  Exercise tolerance: good (>4 METS),   (+) hyperlipidemia        Rhythm: regular  Rate: normal           Beta Blocker:  Not on Beta Blocker         Neuro/Psych:   (+) headaches: migraine headaches, depression/anxiety             GI/Hepatic/Renal:   (+) GERD: well controlled,           Endo/Other: Negative Endo/Other ROS                    Abdominal:             Vascular: negative vascular ROS. Other Findings:             Anesthesia Plan      general     ASA 3       Induction: intravenous. Anesthetic plan and risks discussed with patient. Plan discussed with CRNA.                     KENDALL Caballero - CRNA   6/8/2022

## 2022-06-09 DIAGNOSIS — F43.9 STRESS: Primary | ICD-10-CM

## 2022-06-09 RX ORDER — IBUPROFEN 800 MG/1
800 TABLET ORAL 3 TIMES DAILY PRN
Qty: 30 TABLET | Refills: 0 | Status: CANCELLED | OUTPATIENT
Start: 2022-06-09 | End: 2022-07-10

## 2022-06-09 NOTE — OP NOTE
[address] [facility] [address]                                OPERATIVE REPORT    PATIENT NAME: Kane BLANTON                       :        1975  MED REC NO:   [id number]                              ROOM:  ACCOUNT NO:   [de-identified]                           ADMIT DATE: 2022  PROVIDER:     Yolanda Lan MD    DATE OF PROCEDURE:  2022    PREOPERATIVE DIAGNOSIS:  VIN3.    POSTOPERATIVE DIAGNOSIS:  VIN3, pending final surgical pathology. SURGICAL PROCEDURE:  Wide local excision of vulvar lesion. ANESTHESIA:  General plus local.    ESTIMATED BLOOD LOSS:  Minimal.    COMPLICATIONS OF THE PROCEDURE:  None. FINDINGS:  A very wide thickened epithelium over the area of the  perineum. DESCRIPTION OF PROCEDURE:  The patient was taken to the operating room,  general anesthesia was administered, and the patient was in the dorsal  lithotomy position, where she did undergo perineal prepping, vaginal  prepping, and appropriate draping. A wide local excision was made  around this more than a 1 cm up to a 1.5-cm lesion. This was carefully  outlined with sharp scalpel and then with tenotomy scissors and pickups,  the area was excised completely while taking out the full thickness of  the epithelium. After the excision was complete, a suture was placed at  the 12 o'clock area in this lesion. An elliptical shape was made to aid  with closure. Then cautery was used to coagulate small bleeding vessels  that were noted and then the lesion was closed in two layers, a deep  layer with a 2-0 chromic and then a superficial layer with 2-0 chromic,  all interrupted. Then the antibiotic ointment was applied. This then  did complete the procedure. All sponge and instrument counts were  correct. The patient was taken to recovery room in good condition. She  will be discharged to home with Norco and Bactroban.     Also, of note that [facility], Kaity Fatima was  assisting with the procedure.         Lola Cotter MD    D: 06/08/2022 9:52:44       T: 06/08/2022 11:56:19     [email or web address]  Job#: [id number]     Doc#: [id number]    CC:  _____

## 2022-06-10 RX ORDER — CLONAZEPAM 1 MG/1
1 TABLET ORAL 2 TIMES DAILY PRN
Qty: 60 TABLET | Refills: 0 | Status: SHIPPED | OUTPATIENT
Start: 2022-06-10 | End: 2022-07-29 | Stop reason: SDUPTHER

## 2022-06-10 NOTE — TELEPHONE ENCOUNTER
Health Maintenance   Topic Date Due    COVID-19 Vaccine (1) 08/04/2022 (Originally 4/10/1978)    Flu vaccine (Season Ended) 10/07/2022 (Originally 9/1/2022)    DTaP/Tdap/Td vaccine (1 - Tdap) 02/08/2023 (Originally 4/10/1992)    Hepatitis C screen  02/08/2023 (Originally 4/10/1991)    HIV screen  02/08/2023 (Originally 4/10/1988)    Pneumococcal 0-64 years Vaccine (2 - PCV) 05/12/2023 (Originally 8/10/2018)    Lipids  08/02/2022    Colorectal Cancer Screen  04/12/2023    Depression Monitoring  05/12/2023    Hepatitis A vaccine  Aged Out    Hepatitis B vaccine  Aged Out    Hib vaccine  Aged Out    Meningococcal (ACWY) vaccine  Aged Out             (applicable per patient's age: Cancer Screenings, Depression Screening, Fall Risk Screening, Immunizations)    Microalb/Crt.  Ratio (mcg/mg creat)   Date Value   09/19/2017 16     LDL Cholesterol (mg/dL)   Date Value   08/02/2021 157 (H)     AST (U/L)   Date Value   08/02/2021 15     ALT (U/L)   Date Value   08/02/2021 7     BUN (mg/dL)   Date Value   08/02/2021 6      (goal A1C is < 7)   (goal LDL is <100) need 30-50% reduction from baseline     BP Readings from Last 3 Encounters:   06/08/22 (!) 102/57   05/12/22 104/68   05/05/22 110/66    (goal /80)      All Future Testing planned in [facility]:  Lab Frequency Next Occurrence   CBC Auto Differential Once 08/07/2022   ALT Once 08/08/2022   AST Once 71/52/4329   Basic Metabolic Panel Once 92/78/8591   Hemoglobin A1C Once 08/07/2022   Lipid Panel Once 08/07/2022       Next Visit Date:  Future Appointments   Date Time Provider Claudio Baeza   6/22/2022  2:40 PM Ita Ontiveros MD [facility]   8/10/2022  2:00 PM Kelsi Sanchez, APRN - CNP [facility]            Patient Active Problem List:     Insomnia     Depression     Anxiety associated with depression     Tobacco abuse     Dyslipidemia     Dysphagia     Family history of throat cancer     Gastroesophageal reflux disease     H/O: hysterectomy     Mild intermittent asthma without complication     Condylomata acuminata in female     Stress     LAYLA III (vulvar intraepithelial neoplasia III)

## 2022-06-13 LAB — SURGICAL PATHOLOGY REPORT: NORMAL

## 2022-06-27 ENCOUNTER — OFFICE VISIT (OUTPATIENT)
Dept: OBGYN | Age: 47
End: 2022-06-27
Payer: MEDICARE

## 2022-06-27 VITALS
BODY MASS INDEX: 23.99 KG/M2 | DIASTOLIC BLOOD PRESSURE: 60 MMHG | SYSTOLIC BLOOD PRESSURE: 102 MMHG | HEIGHT: 65 IN | WEIGHT: 144 LBS

## 2022-06-27 DIAGNOSIS — Z48.89 ENCOUNTER FOR POSTOPERATIVE WOUND CHECK: Primary | ICD-10-CM

## 2022-06-27 PROCEDURE — 4004F PT TOBACCO SCREEN RCVD TLK: CPT | Performed by: OBSTETRICS & GYNECOLOGY

## 2022-06-27 PROCEDURE — G8427 DOCREV CUR MEDS BY ELIG CLIN: HCPCS | Performed by: OBSTETRICS & GYNECOLOGY

## 2022-06-27 PROCEDURE — 99212 OFFICE O/P EST SF 10 MIN: CPT | Performed by: OBSTETRICS & GYNECOLOGY

## 2022-06-27 PROCEDURE — G8420 CALC BMI NORM PARAMETERS: HCPCS | Performed by: OBSTETRICS & GYNECOLOGY

## 2022-06-27 PROCEDURE — S0630 REMOVAL OF SUTURES: HCPCS | Performed by: OBSTETRICS & GYNECOLOGY

## 2022-06-27 NOTE — PROGRESS NOTES
PROBLEM VISIT     Date of service: 2022    Harland Dakin  Is a 50 y.o.  female    PT's PCP is: Cheryle Huh Might, APRN - [facility]     : 1975                                             Subjective:       Patient's last menstrual period was 2008. OB History    Para Term  AB Living   3 3 3     3   SAB IAB Ectopic Molar Multiple Live Births             3      # Outcome Date GA Lbr Andres/2nd Weight Sex Delivery Anes PTL Lv   3 Term 99 40w0d   F Vag-Spont   LUANN   2 Term 11/15/95 40w0d   M Vag-Spont   LUANN   1 Term 93 40w0d   M Vag-Spont   LUANN        Social History     Tobacco Use   Smoking Status Current Every Day Smoker    Packs/day: 0.50    Years: 11.00    Pack years: 5.50    Types: Cigarettes   Smokeless Tobacco Never Used        Social History     Substance and Sexual Activity   Alcohol Use No       Social History     Substance and Sexual Activity   Sexual Activity Yes    Partners: Male    Birth control/protection: Surgical    Comment: pt has had a hysterectomy       Allergies: Codeine    Chief Complaint   Patient presents with    Post-Op Check     2 w post op. Leroy III. Pt also presents for pathology results. Last Yearly:  22    Last pap: 22    Last HPV: 22      NURSE: LMD    PE:  Vital Signs  Blood pressure 102/60, height 5' 5\" (1.651 m), weight 144 lb (65.3 kg), last menstrual period 2008, not currently breastfeeding. Labs:    No results found for this visit on 22. NURSE: Tammy Louis    HPI: The patient is here today for a postop visit following a wide local vulvar excision for LEROY 3    No  PT denies fever, chills, nausea and vomiting       Objective: The wound is well approximated and well-healed with no sign of infection.   The Vicryl sutures used have not yet dissolved so they were removed                           Assessment and Plan: Sutures removed without any difficulty patient informed of her diagnosis schedule 6-month follow-up visit          Diagnosis Orders   1. Encounter for postoperative wound check   - WI REMOVAL OF SUTURES             No follow-ups on file. FF: 10 minutes    There are no Patient Instructions on file for this visit. Over 50%of time spent on counseling and care coordination on: see assessment and plan,  She was also counseled on her preventative health maintenance recommendations and follow-up.       Yolanda Lan MD,6/27/2022 12:23 PM

## 2022-07-29 DIAGNOSIS — F43.9 STRESS: ICD-10-CM

## 2022-08-01 RX ORDER — CLONAZEPAM 1 MG/1
1 TABLET ORAL 2 TIMES DAILY PRN
Qty: 60 TABLET | Refills: 0 | Status: SHIPPED | OUTPATIENT
Start: 2022-08-01 | End: 2022-09-08 | Stop reason: SDUPTHER

## 2022-08-01 NOTE — TELEPHONE ENCOUNTER
Health Maintenance   Topic Date Due    Lipids  08/02/2022    COVID-19 Vaccine (1) 08/04/2022 (Originally 1975)    DTaP/Tdap/Td vaccine (1 - Tdap) 02/08/2023 (Originally 4/10/1992)    Hepatitis C screen  02/08/2023 (Originally 4/10/1991)    HIV screen  02/08/2023 (Originally 4/10/1988)    Pneumococcal 0-64 years Vaccine (2 - PCV) 05/12/2023 (Originally 8/10/2018)    Flu vaccine (1) 09/01/2022    Colorectal Cancer Screen  04/12/2023    Depression Monitoring  05/12/2023    Hepatitis A vaccine  Aged Out    Hepatitis B vaccine  Aged Out    Hib vaccine  Aged Out    Meningococcal (ACWY) vaccine  Aged Out             (applicable per patient's age: Cancer Screenings, Depression Screening, Fall Risk Screening, Immunizations)    Microalb/Crt.  Ratio (mcg/mg creat)   Date Value   09/19/2017 16     LDL Cholesterol (mg/dL)   Date Value   08/02/2021 157 (H)     AST (U/L)   Date Value   08/02/2021 15     ALT (U/L)   Date Value   08/02/2021 7     BUN (mg/dL)   Date Value   08/02/2021 6      (goal A1C is < 7)   (goal LDL is <100) need 30-50% reduction from baseline     BP Readings from Last 3 Encounters:   06/27/22 102/60   06/08/22 (!) 102/57   05/12/22 104/68    (goal /80)      All Future Testing planned in [facility]:  Lab Frequency Next Occurrence   CBC Auto Differential Once 08/07/2022   ALT Once 08/08/2022   AST Once 45/37/8531   Basic Metabolic Panel Once 99/90/2082   Hemoglobin A1C Once 08/07/2022   Lipid Panel Once 08/07/2022       Next Visit Date:  Future Appointments   Date Time Provider Claudio Baeza   8/10/2022  2:00 PM Deja Sanhcez APRN - CNP [facility]   12/28/2022  1:20 PM Porfirio Sanders MD [facility]            Patient Active Problem List:     Insomnia     Depression     Anxiety associated with depression     Tobacco abuse     Dyslipidemia     Dysphagia     Family history of throat cancer     Gastroesophageal reflux disease     H/O: hysterectomy     Mild intermittent asthma without complication     Condylomata acuminata in female     Stress     LAYLA III (vulvar intraepithelial neoplasia III)

## 2022-08-10 ENCOUNTER — OFFICE VISIT (OUTPATIENT)
Dept: PRIMARY CARE CLINIC | Age: 47
End: 2022-08-10
Payer: MEDICARE

## 2022-08-10 VITALS
RESPIRATION RATE: 20 BRPM | OXYGEN SATURATION: 98 % | HEART RATE: 68 BPM | DIASTOLIC BLOOD PRESSURE: 74 MMHG | SYSTOLIC BLOOD PRESSURE: 122 MMHG | WEIGHT: 144.5 LBS | BODY MASS INDEX: 24.05 KG/M2 | TEMPERATURE: 97.7 F

## 2022-08-10 DIAGNOSIS — E78.5 DYSLIPIDEMIA: ICD-10-CM

## 2022-08-10 DIAGNOSIS — F34.1 DYSTHYMIA: Primary | ICD-10-CM

## 2022-08-10 DIAGNOSIS — Z13.31 POSITIVE DEPRESSION SCREENING: ICD-10-CM

## 2022-08-10 DIAGNOSIS — F41.1 GAD (GENERALIZED ANXIETY DISORDER): ICD-10-CM

## 2022-08-10 PROCEDURE — G8427 DOCREV CUR MEDS BY ELIG CLIN: HCPCS | Performed by: NURSE PRACTITIONER

## 2022-08-10 PROCEDURE — 99214 OFFICE O/P EST MOD 30 MIN: CPT | Performed by: NURSE PRACTITIONER

## 2022-08-10 PROCEDURE — 4004F PT TOBACCO SCREEN RCVD TLK: CPT | Performed by: NURSE PRACTITIONER

## 2022-08-10 PROCEDURE — G8420 CALC BMI NORM PARAMETERS: HCPCS | Performed by: NURSE PRACTITIONER

## 2022-08-10 RX ORDER — ARIPIPRAZOLE 10 MG/1
10 TABLET ORAL DAILY
Qty: 90 TABLET | Refills: 1 | Status: SHIPPED | OUTPATIENT
Start: 2022-08-10

## 2022-08-10 RX ORDER — DULOXETIN HYDROCHLORIDE 60 MG/1
60 CAPSULE, DELAYED RELEASE ORAL DAILY
Qty: 90 CAPSULE | Refills: 1 | Status: SHIPPED | OUTPATIENT
Start: 2022-08-10

## 2022-08-10 RX ORDER — DULOXETIN HYDROCHLORIDE 30 MG/1
30 CAPSULE, DELAYED RELEASE ORAL DAILY
Qty: 90 CAPSULE | Refills: 1 | Status: SHIPPED | OUTPATIENT
Start: 2022-08-10

## 2022-08-10 RX ORDER — QUETIAPINE 150 MG/1
150 TABLET, FILM COATED, EXTENDED RELEASE ORAL NIGHTLY
Qty: 90 TABLET | Refills: 1 | Status: SHIPPED | OUTPATIENT
Start: 2022-08-10

## 2022-08-10 RX ORDER — ATORVASTATIN CALCIUM 20 MG/1
TABLET, FILM COATED ORAL
Qty: 90 TABLET | Refills: 1 | Status: SHIPPED | OUTPATIENT
Start: 2022-08-10

## 2022-08-10 SDOH — ECONOMIC STABILITY: FOOD INSECURITY: WITHIN THE PAST 12 MONTHS, THE FOOD YOU BOUGHT JUST DIDN'T LAST AND YOU DIDN'T HAVE MONEY TO GET MORE.: PATIENT DECLINED

## 2022-08-10 SDOH — ECONOMIC STABILITY: FOOD INSECURITY: WITHIN THE PAST 12 MONTHS, YOU WORRIED THAT YOUR FOOD WOULD RUN OUT BEFORE YOU GOT MONEY TO BUY MORE.: PATIENT DECLINED

## 2022-08-10 ASSESSMENT — PATIENT HEALTH QUESTIONNAIRE - PHQ9
1. LITTLE INTEREST OR PLEASURE IN DOING THINGS: 0
7. TROUBLE CONCENTRATING ON THINGS, SUCH AS READING THE NEWSPAPER OR WATCHING TELEVISION: 1
10. IF YOU CHECKED OFF ANY PROBLEMS, HOW DIFFICULT HAVE THESE PROBLEMS MADE IT FOR YOU TO DO YOUR WORK, TAKE CARE OF THINGS AT HOME, OR GET ALONG WITH OTHER PEOPLE: 0
8. MOVING OR SPEAKING SO SLOWLY THAT OTHER PEOPLE COULD HAVE NOTICED. OR THE OPPOSITE, BEING SO FIGETY OR RESTLESS THAT YOU HAVE BEEN MOVING AROUND A LOT MORE THAN USUAL: 2
5. POOR APPETITE OR OVEREATING: 2
SUM OF ALL RESPONSES TO PHQ QUESTIONS 1-9: 15
SUM OF ALL RESPONSES TO PHQ QUESTIONS 1-9: 15
6. FEELING BAD ABOUT YOURSELF - OR THAT YOU ARE A FAILURE OR HAVE LET YOURSELF OR YOUR FAMILY DOWN: 2
SUM OF ALL RESPONSES TO PHQ QUESTIONS 1-9: 15
2. FEELING DOWN, DEPRESSED OR HOPELESS: 3
3. TROUBLE FALLING OR STAYING ASLEEP: 3
4. FEELING TIRED OR HAVING LITTLE ENERGY: 2
SUM OF ALL RESPONSES TO PHQ QUESTIONS 1-9: 15
SUM OF ALL RESPONSES TO PHQ9 QUESTIONS 1 & 2: 3

## 2022-08-10 ASSESSMENT — ENCOUNTER SYMPTOMS
VISUAL CHANGE: 0
DIARRHEA: 0
WHEEZING: 0
NAUSEA: 0
SHORTNESS OF BREATH: 0
CONSTIPATION: 0
COUGH: 0
VOMITING: 0
RHINORRHEA: 0
SORE THROAT: 0
ABDOMINAL PAIN: 0

## 2022-08-10 ASSESSMENT — SOCIAL DETERMINANTS OF HEALTH (SDOH): HOW HARD IS IT FOR YOU TO PAY FOR THE VERY BASICS LIKE FOOD, HOUSING, MEDICAL CARE, AND HEATING?: PATIENT DECLINED

## 2022-08-10 NOTE — PATIENT INSTRUCTIONS
SURVEY:     You may be receiving a survey from [employer] regarding your visit today. Please complete the survey to enable us to provide the highest quality of care to you and your family. If you cannot score us a very good on any question, please call the office to discuss how we could have made your experience a very good one.      Thank you,    Emile Sanchez, APRN-CNP  Slim Montes, APRN-CNP  Lyric De Santiago, DELILAH Russo, SERINA Sheehan, SERINA Mccray, CMA  Nakia, ZARINA Mendoza, PM

## 2022-08-10 NOTE — PROGRESS NOTES
Name: Nicolas Carr  : 1975         Chief Complaint:     Chief Complaint   Patient presents with    Mental Health Problem     Routine check, no concerns    Hyperlipidemia       History of Present Illness:      Nicolas Carr is a 50 y.o.  female who presents with Mental Health Problem (Routine check, no concerns) and Hyperlipidemia      Barb is here today for a routine office visit. Dysthymia/anxiety-worsening, patient has not been compliant with her medications. She states the pharmacy does not call her when it is time for refill. I did explain to her that is her responsibility to make sure she has her medications. She states she has trouble remembering and she will have her daughter help her. She is however remembering to take Klonopin as needed. See below for further comment. Dyslipidemia-has not been taking medication. Uncontrolled. See below for further comment. Mental Health Problem  The primary symptoms include dysphoric mood and somatic symptoms. The primary symptoms do not include delusions, hallucinations, bizarre behavior, disorganized speech or negative symptoms. The current episode started more than 1 month ago. This is a chronic problem. The dysphoric mood began more than 2 weeks ago. The mood has been improving since its onset. She characterizes the problem as mild. The mood includes feelings of sadness. Her change in mood was precipitated by a stressful event. The somatic symptoms began more than 1 month ago. The somatic symptoms have been worsening since their onset. The symptoms are moderate. Somatic symptoms include fatigue. Somatic symptoms do not include headaches, abdominal pain or constipation. The onset of the illness is precipitated by emotional stress. The degree of incapacity that she is experiencing as a consequence of her illness is moderate. Sequelae of the illness include harmed interpersonal relations.  Additional symptoms of the illness include insomnia, fatigue, attention impairment and distractible. Additional symptoms of the illness do not include anhedonia, hypersomnia, unexpected weight change, agitation, psychomotor retardation, feelings of worthlessness, euphoric mood, increased goal-directed activity, flight of ideas, inflated self-esteem, decreased need for sleep, poor judgment, visual change, headaches, abdominal pain or seizures. She does not admit to suicidal ideas. She does not have a plan to attempt suicide. She does not contemplate harming herself. She has not already injured self. She does not contemplate injuring another person. She has not already  injured another person. Risk factors that are present for mental illness include a history of mental illness and a family history of mental illness. Hyperlipidemia  This is a chronic problem. The current episode started more than 1 year ago. The problem is uncontrolled. Recent lipid tests were reviewed and are high. She has no history of diabetes, hypothyroidism or nephrotic syndrome. Factors aggravating her hyperlipidemia include fatty foods and smoking. Pertinent negatives include no chest pain, leg pain or shortness of breath. She is currently on no antihyperlipidemic treatment. The current treatment provides no improvement of lipids. Compliance problems include adherence to diet and adherence to exercise. Risk factors for coronary artery disease include stress, family history and dyslipidemia.        Past Medical History:     Past Medical History:   Diagnosis Date    Collapsed lung     Depression     Headache(784.0)     Hypotension     Spontaneous pneumothorax     Vasodepressor syncope       Reviewed all health maintenance requirements and ordered appropriate tests  Health Maintenance Due   Topic Date Due    Lipids  08/02/2022       Past Surgical History:     Past Surgical History:   Procedure Laterality Date    CERVIX SURGERY N/A 3/17/2021    CERVIX ABLATION LASER-CO2 VAPORIZATION OF GENITAL CONDYLOMA performed by Juan C Limon MD at [address]  03/17/2021    genital    HYSTERECTOMY (CERVIX STATUS UNKNOWN)      TUBAL LIGATION      VAGINA SURGERY  06/08/2022    Wide excision    VAGINA SURGERY N/A 6/8/2022    VAGINAL BIOPSY EXCISION - WIDE LOCAL performed by Juan C Limon MD at [address]        Medications:       Prior to Admission medications    Medication Sig Start Date End Date Taking? Authorizing Provider   atorvastatin (LIPITOR) 20 MG tablet take 1 tablet by mouth once daily 8/10/22  Yes KENDALL Lewis CNP   DULoxetine (CYMBALTA) 30 MG extended release capsule Take 1 capsule by mouth in the morning. 8/10/22  Yes KENDALL Lewis CNP   DULoxetine (CYMBALTA) 60 MG extended release capsule Take 1 capsule by mouth in the morning. 8/10/22  Yes KENDALL Lewis CNP   QUEtiapine (SEROQUEL XR) 150 MG TB24 extended release tablet Take 1 tablet by mouth nightly 8/10/22  Yes KENDALL Lewis CNP   ARIPiprazole (ABILIFY) 10 MG tablet Take 1 tablet by mouth in the morning. 8/10/22  Yes KENDALL Lewis CNP   clonazePAM (KLONOPIN) 1 MG tablet Take 1 tablet by mouth 2 times daily as needed for Anxiety for up to 30 days. 8/1/22 8/31/22 Yes KENDALL Montes CNP   albuterol sulfate HFA (VENTOLIN HFA) 108 (90 Base) MCG/ACT inhaler Inhale 2 puffs into the lungs every 6 hours as needed for Wheezing 2/1/22  Yes KENDALL Lewis CNP   estrogens, conjugated, (PREMARIN) 0.625 MG tablet Take 1 tablet by mouth daily  Patient not taking: Reported on 8/10/2022 4/7/22   Juan C Limon MD        Allergies:       Codeine    Social History:     Tobacco:    reports that she has been smoking cigarettes. She has a 5.50 pack-year smoking history. She has never used smokeless tobacco.  Alcohol:      reports no history of alcohol use. Drug Use:  reports no history of drug use.     Family History:     Family History   Problem Relation Age of Onset Diabetes Mother     Thyroid Disease Mother     Arthritis Mother     Cancer Father     Stroke Father     Asthma Father     Emphysema Father     Seizures Sister     Diabetes Sister     No Known Problems Maternal Grandmother     No Known Problems Maternal Grandfather     No Known Problems Paternal Grandmother     No Known Problems Paternal Grandfather     Other Other         no family h/o breast or ovarian cancer        Review of Systems:     Positive and Negative as described in HPI    Review of Systems   Constitutional:  Positive for fatigue. Negative for chills, fever and unexpected weight change. HENT:  Negative for congestion, rhinorrhea and sore throat. Eyes:  Negative for visual disturbance. Respiratory:  Negative for cough, shortness of breath and wheezing. Cardiovascular:  Negative for chest pain and palpitations. Gastrointestinal:  Negative for abdominal pain, constipation, diarrhea, nausea and vomiting. Genitourinary:  Negative for difficulty urinating and dysuria. Musculoskeletal:  Negative for gait problem, neck pain and neck stiffness. Skin:  Negative for rash. Neurological:  Negative for dizziness, seizures, syncope, light-headedness and headaches. Psychiatric/Behavioral:  Positive for dysphoric mood and sleep disturbance. Negative for agitation, behavioral problems, confusion, decreased concentration, hallucinations, self-injury and suicidal ideas. The patient is nervous/anxious and has insomnia. The patient is not hyperactive. Physical Exam:   Vitals:  /74 (Position: Sitting)   Pulse 68   Temp 97.7 °F (36.5 °C) (Temporal)   Resp 20   Wt 144 lb 8 oz (65.5 kg)   LMP 04/07/2008   SpO2 98%   BMI 24.05 kg/m²     Physical Exam  Constitutional:       General: She is not in acute distress. Appearance: Normal appearance. She is not ill-appearing. HENT:      Mouth/Throat:      Mouth: Mucous membranes are moist.   Eyes:      General: No scleral icterus. Conjunctiva/sclera: Conjunctivae normal.   Neck:      Vascular: No carotid bruit. Cardiovascular:      Rate and Rhythm: Normal rate and regular rhythm. Heart sounds: Normal heart sounds. No murmur heard. Pulmonary:      Effort: Pulmonary effort is normal. No respiratory distress. Breath sounds: Normal breath sounds. No wheezing. Abdominal:      General: Bowel sounds are normal. There is no distension. Palpations: Abdomen is soft. There is no mass. Tenderness: There is no abdominal tenderness. Musculoskeletal:      Cervical back: Normal range of motion and neck supple. Lymphadenopathy:      Cervical: No cervical adenopathy. Skin:     General: Skin is warm and dry. Findings: No rash. Neurological:      Mental Status: She is alert and oriented to person, place, and time. Psychiatric:         Attention and Perception: Attention normal.         Mood and Affect: Mood is anxious. Speech: Speech normal.         Behavior: Behavior normal. Behavior is cooperative. Thought Content: Thought content normal. Thought content does not include homicidal or suicidal ideation. Thought content does not include homicidal or suicidal plan.          Cognition and Memory: Cognition normal.         Judgment: Judgment normal.       Data:     Lab Results   Component Value Date/Time     08/02/2021 11:33 AM    K 4.4 08/02/2021 11:33 AM     08/02/2021 11:33 AM    CO2 23 08/02/2021 11:33 AM    BUN 6 08/02/2021 11:33 AM    CREATININE 0.89 08/02/2021 11:33 AM    GLUCOSE 91 08/02/2021 11:33 AM    PROT 7.3 09/19/2017 01:39 PM    LABALBU 4.2 09/19/2017 01:39 PM    BILITOT 0.54 09/19/2017 01:39 PM    ALKPHOS 101 09/19/2017 01:39 PM    AST 15 08/02/2021 11:33 AM    ALT 7 08/02/2021 11:33 AM     Lab Results   Component Value Date/Time    WBC 10.1 08/02/2021 11:33 AM    RBC 4.28 08/02/2021 11:33 AM    HGB 13.6 08/02/2021 11:33 AM    HCT 41.5 08/02/2021 11:33 AM    MCV 97.0 08/02/2021 11:33 AM    MCH 31.8 08/02/2021 11:33 AM    MCHC 32.8 08/02/2021 11:33 AM    RDW 13.1 08/02/2021 11:33 AM     08/02/2021 11:33 AM    MPV 9.5 08/02/2021 11:33 AM     Lab Results   Component Value Date/Time    TSH 2.94 02/10/2021 08:52 AM     Lab Results   Component Value Date/Time    CHOL 227 08/02/2021 11:34 AM    HDL 42 08/02/2021 11:34 AM       Assessment/Plan:      Diagnosis Orders   1. Dysthymia  DULoxetine (CYMBALTA) 60 MG extended release capsule    ARIPiprazole (ABILIFY) 10 MG tablet      2. JESUS (generalized anxiety disorder)        3. Dyslipidemia  CBC with Auto Differential    ALT    AST    Basic Metabolic Panel    Lipid Panel    atorvastatin (LIPITOR) 20 MG tablet      4. Positive depression screening          We will restart all medications. Prescriptions have been sent to the pharmacy. We have called to verify they have received them. She will call in a few weeks with progress. Sooner if any issues. We will see her back in 6 months for recheck, sooner if any issues. 1.  Barb received counseling on the following healthy behaviors: nutrition, exercise, medication adherence, and tobacco cessation  2. Patient given educational materials - see patient instructions  3. Was a self-tracking handout given in paper form or via [employer]? No  If yes, see orders or list here. 4.  Discussed use, benefit, and side effects of prescribed medications. Barriers to medication compliance addressed. All patient questions answered. Pt voiced understanding. 5.  Reviewed prior labs and health maintenance  6. Continue current medications, diet and exercise. Completed Refills   Requested Prescriptions     Signed Prescriptions Disp Refills    atorvastatin (LIPITOR) 20 MG tablet 90 tablet 1     Sig: take 1 tablet by mouth once daily    DULoxetine (CYMBALTA) 30 MG extended release capsule 90 capsule 1     Sig: Take 1 capsule by mouth in the morning.     DULoxetine (CYMBALTA) 60 MG extended release capsule 90 capsule 1     Sig: Take 1 capsule by mouth in the morning. QUEtiapine (SEROQUEL XR) 150 MG TB24 extended release tablet 90 tablet 1     Sig: Take 1 tablet by mouth nightly    ARIPiprazole (ABILIFY) 10 MG tablet 90 tablet 1     Sig: Take 1 tablet by mouth in the morning. Return in about 6 months (around 2/10/2023) for Check up. PHQ-9 score today: (PHQ-9 Total Score: 15), additional evaluation and assessment performed, follow-up plan includes but not limited to: Medication management and Referral to /Specialist  for evaluation and management.

## 2022-09-08 DIAGNOSIS — F43.9 STRESS: ICD-10-CM

## 2022-09-08 RX ORDER — CLONAZEPAM 1 MG/1
1 TABLET ORAL 2 TIMES DAILY PRN
Qty: 60 TABLET | Refills: 0 | Status: SHIPPED | OUTPATIENT
Start: 2022-09-08 | End: 2022-10-10 | Stop reason: SDUPTHER

## 2022-09-08 NOTE — TELEPHONE ENCOUNTER
Health Maintenance   Topic Date Due    Lipids  08/02/2022    Flu vaccine (1) 09/01/2022    DTaP/Tdap/Td vaccine (1 - Tdap) 02/08/2023 (Originally 4/10/1992)    Hepatitis C screen  02/08/2023 (Originally 4/10/1991)    HIV screen  02/08/2023 (Originally 4/10/1988)    Pneumococcal 0-64 years Vaccine (2 - PCV) 05/12/2023 (Originally 8/10/2018)    COVID-19 Vaccine (1) 08/10/2023 (Originally 1975)    Colorectal Cancer Screen  04/12/2023    Depression Monitoring  08/10/2023    Hepatitis A vaccine  Aged Out    Hepatitis B vaccine  Aged Out    Hib vaccine  Aged Out    Meningococcal (ACWY) vaccine  Aged Out             (applicable per patient's age: Cancer Screenings, Depression Screening, Fall Risk Screening, Immunizations)    Microalb/Crt.  Ratio (mcg/mg creat)   Date Value   09/19/2017 16     LDL Cholesterol (mg/dL)   Date Value   08/02/2021 157 (H)     AST (U/L)   Date Value   08/02/2021 15     ALT (U/L)   Date Value   08/02/2021 7     BUN (mg/dL)   Date Value   08/02/2021 6      (goal A1C is < 7)   (goal LDL is <100) need 30-50% reduction from baseline     BP Readings from Last 3 Encounters:   08/10/22 122/74   06/27/22 102/60   06/08/22 (!) 102/57    (goal /80)      All Future Testing planned in [facility]:  Lab Frequency Next Occurrence   Hemoglobin A1C Once 08/07/2022   CBC with Auto Differential Once 02/06/2023   ALT Once 02/10/2023   AST Once 71/68/8826   Basic Metabolic Panel Once 82/34/7930   Lipid Panel Once 02/06/2023       Next Visit Date:  Future Appointments   Date Time Provider Claudio Baeza   12/28/2022  1:20 PM Orlando Padilla MD [facility]   2/10/2023  1:00 PM Isis Sanchez APRN - CNP [facility]            Patient Active Problem List:     Insomnia     Depression     Anxiety associated with depression     Tobacco abuse     Dyslipidemia     Dysphagia     Family history of throat cancer     Gastroesophageal reflux disease     H/O: hysterectomy     Mild intermittent asthma without complication     Condylomata acuminata in female     Stress     LAYLA III (vulvar intraepithelial neoplasia III)     JESUS (generalized anxiety disorder)

## 2022-09-19 NOTE — TELEPHONE ENCOUNTER
----- Message from Bassam Carranza sent at 8/6/2021  3:14 PM EDT -----  Subject: Refill Request    QUESTIONS  Name of Medication? atorvastatin (LIPITOR) 20 MG tablet  Patient-reported dosage and instructions? once a day  How many days do you have left? 0  Preferred Pharmacy? [facility] [facility] phone number (if available)? [phone]  ---------------------------------------------------------------------------  --------------,  Name of Medication? varenicline (CHANTIX STARTING MONTH MENDEZ) 0.5 MG X 11 &   1 MG X 42 tablet  Patient-reported dosage and instructions? 42 mgs  How many days do you have left? 0  Preferred Pharmacy? [facility] [facility] phone number (if available)? [phone]  ---------------------------------------------------------------------------  --------------  CALL BACK INFO  What is the best way for the office to contact you? OK to leave message on   voicemail  Preferred Call Back Phone Number?  [phone]
Health Maintenance   Topic Date Due    Colon cancer screen colonoscopy  Never done    DTaP/Tdap/Td vaccine (1 - Tdap) 01/11/2022 (Originally 4/10/1992)    Hepatitis C screen  01/11/2022 (Originally 1975)    HIV screen  01/11/2022 (Originally 4/10/1988)    COVID-19 Vaccine (1) 08/04/2022 (Originally 4/10/1985)    Flu vaccine (1) 09/01/2021    Lipid screen  08/02/2022    Pneumococcal 0-64 years Vaccine (2 of 2 - PPSV23) 04/10/2038    Hepatitis A vaccine  Aged Out    Hepatitis B vaccine  Aged Out    Hib vaccine  Aged Out    Meningococcal (ACWY) vaccine  Aged Out             (applicable per patient's age: Cancer Screenings, Depression Screening, Fall Risk Screening, Immunizations)    Microalb/Crt.  Ratio (mcg/mg creat)   Date Value   09/19/2017 16     LDL Cholesterol (mg/dL)   Date Value   08/02/2021 157 (H)     AST (U/L)   Date Value   08/02/2021 15     ALT (U/L)   Date Value   08/02/2021 7     BUN (mg/dL)   Date Value   08/02/2021 6      (goal A1C is < 7)   (goal LDL is <100) need 30-50% reduction from baseline     BP Readings from Last 3 Encounters:   08/04/21 120/70   04/01/21 114/70   03/17/21 104/64    (goal /80)      All Future Testing planned in [facility]:  Lab Frequency Next Occurrence   POCT Fecal Immunochemical Test (FIT) Once 08/25/2021       Next Visit Date:  Future Appointments   Date Time Provider Claudio Baeza   2/8/2022 11:00 AM KENDALL Jimenez - CNP [facility]   4/7/2022  1:40 PM Amanda Mancini MD [facility]            Patient Active Problem List:     Insomnia     Depression     Anxiety associated with depression     Tobacco abuse     Dyslipidemia     Dysphagia     Family history of throat cancer     Gastroesophageal reflux disease     H/O: hysterectomy     Mild intermittent asthma without complication     Condylomata acuminata in female     Stress
Quality 130: Documentation Of Current Medications In The Medical Record: Current Medications Documented
Detail Level: Detailed

## 2022-10-10 DIAGNOSIS — F43.9 STRESS: ICD-10-CM

## 2022-10-10 RX ORDER — CLONAZEPAM 1 MG/1
1 TABLET ORAL 2 TIMES DAILY PRN
Qty: 60 TABLET | Refills: 0 | Status: SHIPPED | OUTPATIENT
Start: 2022-10-10 | End: 2022-11-09

## 2022-10-10 NOTE — TELEPHONE ENCOUNTER
Health Maintenance   Topic Date Due    Flu vaccine (1) 08/01/2022    Lipids  08/02/2022    DTaP/Tdap/Td vaccine (1 - Tdap) 02/08/2023 (Originally 4/10/1992)    Hepatitis C screen  02/08/2023 (Originally 4/10/1991)    HIV screen  02/08/2023 (Originally 4/10/1988)    COVID-19 Vaccine (1) 08/10/2023 (Originally 1975)    Colorectal Cancer Screen  04/12/2023    Depression Monitoring  08/10/2023    Pneumococcal 0-64 years Vaccine  Completed    Hepatitis A vaccine  Aged Out    Hib vaccine  Aged Out    Meningococcal (ACWY) vaccine  Aged Out             (applicable per patient's age: Cancer Screenings, Depression Screening, Fall Risk Screening, Immunizations)    Microalb/Crt.  Ratio (mcg/mg creat)   Date Value   09/19/2017 16     LDL Cholesterol (mg/dL)   Date Value   08/02/2021 157 (H)     AST (U/L)   Date Value   08/02/2021 15     ALT (U/L)   Date Value   08/02/2021 7     BUN (mg/dL)   Date Value   08/02/2021 6      (goal A1C is < 7)   (goal LDL is <100) need 30-50% reduction from baseline     BP Readings from Last 3 Encounters:   08/10/22 122/74   06/27/22 102/60   06/08/22 (!) 102/57    (goal /80)      All Future Testing planned in [facility]:  Lab Frequency Next Occurrence   Hemoglobin A1C Once 08/07/2022   CBC with Auto Differential Once 02/06/2023   ALT Once 02/10/2023   AST Once 13/46/4694   Basic Metabolic Panel Once 99/55/7994   Lipid Panel Once 02/06/2023       Next Visit Date:  Future Appointments   Date Time Provider Claudio Baeza   12/28/2022  1:20 PM Anoop Herrera MD [facility]   2/10/2023  1:00 PM Rosemarie Sanchez APRN - CNP [facility]            Patient Active Problem List:     Insomnia     Depression     Anxiety associated with depression     Tobacco abuse     Dyslipidemia     Dysphagia     Family history of throat cancer     Gastroesophageal reflux disease     H/O: hysterectomy     Mild intermittent asthma without complication     Condylomata acuminata in female     Stress LAYLA III (vulvar intraepithelial neoplasia III)     JESUS (generalized anxiety disorder)

## 2022-11-09 DIAGNOSIS — F43.9 STRESS: ICD-10-CM

## 2022-11-09 RX ORDER — CLONAZEPAM 1 MG/1
1 TABLET ORAL 2 TIMES DAILY PRN
Qty: 60 TABLET | Refills: 0 | Status: SHIPPED | OUTPATIENT
Start: 2022-11-09 | End: 2022-12-09

## 2022-11-09 NOTE — TELEPHONE ENCOUNTER
Health Maintenance   Topic Date Due    Flu vaccine (1) 08/01/2022    Lipids  08/02/2022    DTaP/Tdap/Td vaccine (1 - Tdap) 02/08/2023 (Originally 4/10/1992)    Hepatitis C screen  02/08/2023 (Originally 4/10/1991)    HIV screen  02/08/2023 (Originally 4/10/1988)    COVID-19 Vaccine (1) 08/10/2023 (Originally 1975)    Colorectal Cancer Screen  04/12/2023    Depression Monitoring  08/10/2023    Pneumococcal 0-64 years Vaccine  Completed    Hepatitis A vaccine  Aged Out    Hib vaccine  Aged Out    Meningococcal (ACWY) vaccine  Aged Out             (applicable per patient's age: Cancer Screenings, Depression Screening, Fall Risk Screening, Immunizations)    Microalb/Crt.  Ratio (mcg/mg creat)   Date Value   09/19/2017 16     LDL Cholesterol (mg/dL)   Date Value   08/02/2021 157 (H)     AST (U/L)   Date Value   08/02/2021 15     ALT (U/L)   Date Value   08/02/2021 7     BUN (mg/dL)   Date Value   08/02/2021 6      (goal A1C is < 7)   (goal LDL is <100) need 30-50% reduction from baseline     BP Readings from Last 3 Encounters:   08/10/22 122/74   06/27/22 102/60   06/08/22 (!) 102/57    (goal /80)      All Future Testing planned in [facility]:  Lab Frequency Next Occurrence   Hemoglobin A1C Once 08/07/2022   CBC with Auto Differential Once 02/06/2023   ALT Once 02/10/2023   AST Once 07/04/9359   Basic Metabolic Panel Once 38/27/5514   Lipid Panel Once 02/06/2023       Next Visit Date:  Future Appointments   Date Time Provider Claudio Baeza   12/28/2022  1:20 PM Nayana Wilkins MD [facility]   2/10/2023  1:00 PM KENDALL Cisneros - CNP [facility]            Patient Active Problem List:     Insomnia     Depression     Anxiety associated with depression     Tobacco abuse     Dyslipidemia     Dysphagia     Family history of throat cancer     Gastroesophageal reflux disease     H/O: hysterectomy     Mild intermittent asthma without complication     Condylomata acuminata in female     Stress LAYLA III (vulvar intraepithelial neoplasia III)     JESUS (generalized anxiety disorder)

## 2022-11-29 ENCOUNTER — OFFICE VISIT (OUTPATIENT)
Dept: PRIMARY CARE CLINIC | Age: 47
End: 2022-11-29
Payer: MEDICARE

## 2022-11-29 VITALS
DIASTOLIC BLOOD PRESSURE: 70 MMHG | OXYGEN SATURATION: 98 % | BODY MASS INDEX: 25.84 KG/M2 | HEART RATE: 74 BPM | RESPIRATION RATE: 20 BRPM | TEMPERATURE: 97.7 F | SYSTOLIC BLOOD PRESSURE: 118 MMHG | WEIGHT: 155.3 LBS

## 2022-11-29 DIAGNOSIS — L84 CALLUS OF FOOT: Primary | ICD-10-CM

## 2022-11-29 DIAGNOSIS — M79.672 LEFT FOOT PAIN: ICD-10-CM

## 2022-11-29 DIAGNOSIS — Z23 NEED FOR INFLUENZA VACCINATION: ICD-10-CM

## 2022-11-29 PROCEDURE — G8427 DOCREV CUR MEDS BY ELIG CLIN: HCPCS | Performed by: NURSE PRACTITIONER

## 2022-11-29 PROCEDURE — 99213 OFFICE O/P EST LOW 20 MIN: CPT | Performed by: NURSE PRACTITIONER

## 2022-11-29 PROCEDURE — G8419 CALC BMI OUT NRM PARAM NOF/U: HCPCS | Performed by: NURSE PRACTITIONER

## 2022-11-29 PROCEDURE — 90674 CCIIV4 VAC NO PRSV 0.5 ML IM: CPT | Performed by: NURSE PRACTITIONER

## 2022-11-29 PROCEDURE — 90471 IMMUNIZATION ADMIN: CPT | Performed by: NURSE PRACTITIONER

## 2022-11-29 PROCEDURE — 4004F PT TOBACCO SCREEN RCVD TLK: CPT | Performed by: NURSE PRACTITIONER

## 2022-11-29 PROCEDURE — G8482 FLU IMMUNIZE ORDER/ADMIN: HCPCS | Performed by: NURSE PRACTITIONER

## 2022-11-29 ASSESSMENT — PATIENT HEALTH QUESTIONNAIRE - PHQ9
5. POOR APPETITE OR OVEREATING: 0
9. THOUGHTS THAT YOU WOULD BE BETTER OFF DEAD, OR OF HURTING YOURSELF: 0
SUM OF ALL RESPONSES TO PHQ QUESTIONS 1-9: 0
2. FEELING DOWN, DEPRESSED OR HOPELESS: 0
7. TROUBLE CONCENTRATING ON THINGS, SUCH AS READING THE NEWSPAPER OR WATCHING TELEVISION: 0
8. MOVING OR SPEAKING SO SLOWLY THAT OTHER PEOPLE COULD HAVE NOTICED. OR THE OPPOSITE, BEING SO FIGETY OR RESTLESS THAT YOU HAVE BEEN MOVING AROUND A LOT MORE THAN USUAL: 0
SUM OF ALL RESPONSES TO PHQ QUESTIONS 1-9: 0
3. TROUBLE FALLING OR STAYING ASLEEP: 0
SUM OF ALL RESPONSES TO PHQ9 QUESTIONS 1 & 2: 0
10. IF YOU CHECKED OFF ANY PROBLEMS, HOW DIFFICULT HAVE THESE PROBLEMS MADE IT FOR YOU TO DO YOUR WORK, TAKE CARE OF THINGS AT HOME, OR GET ALONG WITH OTHER PEOPLE: 0
4. FEELING TIRED OR HAVING LITTLE ENERGY: 0
SUM OF ALL RESPONSES TO PHQ QUESTIONS 1-9: 0
6. FEELING BAD ABOUT YOURSELF - OR THAT YOU ARE A FAILURE OR HAVE LET YOURSELF OR YOUR FAMILY DOWN: 0
1. LITTLE INTEREST OR PLEASURE IN DOING THINGS: 0
SUM OF ALL RESPONSES TO PHQ QUESTIONS 1-9: 0

## 2022-11-29 NOTE — PATIENT INSTRUCTIONS
SURVEY:     You may be receiving a survey from [employer] regarding your visit today. Please complete the survey to enable us to provide the highest quality of care to you and your family. If you cannot score us a very good on any question, please call the office to discuss how we could have made your experience a very good one.      Thank you,    Ciarra Sanchez, APRN-CNP  Myranda Everett, APRN-CNP  Nii Landaverde, DELILAH Dodge, SERINA Montero, SERINA Mccray, CMA  Nakia, ZARINA Mendoza, PM

## 2022-11-29 NOTE — PROGRESS NOTES
2022    Barb Jarrett (:  1975) is a 50 y.o. female, here for a preventive medicine evaluation. Patient Active Problem List   Diagnosis    Insomnia    Depression    Anxiety associated with depression    Tobacco abuse    Dyslipidemia    Dysphagia    Family history of throat cancer    Gastroesophageal reflux disease    H/O: hysterectomy    Mild intermittent asthma without complication    Condylomata acuminata in female    Stress    LAYLA III (vulvar intraepithelial neoplasia III)    JESUS (generalized anxiety disorder)       Review of Systems    Prior to Visit Medications    Medication Sig Taking? Authorizing Provider   clonazePAM (KLONOPIN) 1 MG tablet Take 1 tablet by mouth 2 times daily as needed for Anxiety for up to 30 days. Yes Tika Hernandez APRN - CNP   atorvastatin (LIPITOR) 20 MG tablet take 1 tablet by mouth once daily Yes Frederic Perez Might, APRN - CNP   DULoxetine (CYMBALTA) 30 MG extended release capsule Take 1 capsule by mouth in the morning. Yes Frederic Perez Might, APRN - CNP   DULoxetine (CYMBALTA) 60 MG extended release capsule Take 1 capsule by mouth in the morning. Yes Frederic Perez Might, APRN - CNP   QUEtiapine (SEROQUEL XR) 150 MG TB24 extended release tablet Take 1 tablet by mouth nightly Yes Frederic Perez Might, APRN - CNP   ARIPiprazole (ABILIFY) 10 MG tablet Take 1 tablet by mouth in the morning.  Yes Frederic Perez Might, APRN - CNP   estrogens, conjugated, (PREMARIN) 0.625 MG tablet Take 1 tablet by mouth daily Yes Jolanta Galan MD   albuterol sulfate HFA (VENTOLIN HFA) 108 (90 Base) MCG/ACT inhaler Inhale 2 puffs into the lungs every 6 hours as needed for Wheezing Yes Frederic Perez Might, APRN - CNP        Allergies   Allergen Reactions    Codeine Itching       Past Medical History:   Diagnosis Date    Collapsed lung     Depression     Headache(784.0)     Hypotension     Spontaneous pneumothorax     Vasodepressor syncope        Past Surgical History:   Procedure Laterality Date    CERVIX SURGERY N/A 3/17/2021    CERVIX ABLATION LASER-CO2 VAPORIZATION OF GENITAL CONDYLOMA performed by Stevan Harkins MD at [address] [facility]  03/17/2021    genital    HYSTERECTOMY (CERVIX STATUS UNKNOWN)      TUBAL LIGATION      VAGINA SURGERY  06/08/2022    Wide excision    VAGINA SURGERY N/A 6/8/2022    VAGINAL BIOPSY EXCISION - WIDE LOCAL performed by Stevan Harkins MD at [facility] History    Marital status: Legally      Spouse name: Lukas Jasso    Number of children: 3    Years of education: 11    Highest education level: 11th grade   Occupational History    Not on file   Tobacco Use    Smoking status: Every Day     Packs/day: 1.00     Years: 15.00     Pack years: 15.00     Types: Cigarettes    Smokeless tobacco: Never   Vaping Use    Vaping Use: Never used   Substance and Sexual Activity    Alcohol use: Never    Drug use: No    Sexual activity: Yes     Partners: Male     Birth control/protection: Surgical     Comment: pt has had a hysterectomy   Other Topics Concern    Not on file   Social History Narrative    Not on file     Social Determinants of Health     Financial Resource Strain: Unknown    Difficulty of Paying Living Expenses: Patient refused   Food Insecurity: Unknown    Worried About Running Out of Food in the Last Year: Patient refused    Ran Out of Food in the Last Year: Patient refused   Transportation Needs: Not on file   Physical Activity: Not on file   Stress: Not on file   Social Connections: Not on file   Intimate Partner Violence: Not on file   Housing Stability: Not on file        Family History   Problem Relation Age of Onset    Diabetes Mother     Thyroid Disease Mother     Arthritis Mother     Cancer Father     Stroke Father     Asthma Father     Emphysema Father     Seizures Sister     Diabetes Sister     No Known Problems Maternal Grandmother     No Known Problems Maternal Grandfather     No Known Problems Paternal Grandmother     No Known Problems Paternal Grandfather     Other Other         no family h/o breast or ovarian cancer        ADVANCE DIRECTIVE: N, <no information>    Vitals:    11/29/22 1045   BP: 118/70   Position: Sitting   Pulse: 74   Resp: 20   Temp: 97.7 °F (36.5 °C)   TempSrc: Temporal   SpO2: 98%   Weight: 155 lb 4.8 oz (70.4 kg)     Estimated body mass index is 25.84 kg/m² as calculated from the following:    Height as of 6/27/22: 5' 5\" (1.651 m). Weight as of this encounter: 155 lb 4.8 oz (70.4 kg). Physical Exam    No flowsheet data found.     Lab Results   Component Value Date/Time    CHOL 227 08/02/2021 11:34 AM    CHOL 222 07/08/2020 10:38 AM    CHOL 222 12/31/2019 11:22 AM    TRIG 140 08/02/2021 11:34 AM    TRIG 141 07/08/2020 10:38 AM    TRIG 160 12/31/2019 11:22 AM    HDL 42 08/02/2021 11:34 AM    HDL 43 07/08/2020 10:38 AM    HDL 37 12/31/2019 11:22 AM    LDLCHOLESTEROL 157 08/02/2021 11:34 AM    LDLCHOLESTEROL 151 07/08/2020 10:38 AM    LDLCHOLESTEROL 153 12/31/2019 11:22 AM    GLUCOSE 91 08/02/2021 11:33 AM       The 10-year ASCVD risk score (Teo HUMPHREYS, et al., 2019) is: 4.8%    Values used to calculate the score:      Age: 50 years      Sex: Female      Is Non- : No      Diabetic: No      Tobacco smoker: Yes      Systolic Blood Pressure: 615 mmHg      Is BP treated: No      HDL Cholesterol: 42 mg/dL      Total Cholesterol: 227 mg/dL    Immunization History   Administered Date(s) Administered    Influenza 12/18/2012    Influenza, AFLURIA (age 1 yrs+), FLUZONE, (age 10 mo+), MDV, 0.5mL 11/12/2020    Influenza, FLUARIX, FLULAVAL, FLUZONE (age 10 mo+) AND AFLURIA, (age 1 y+), PF, 0.5mL 12/14/2017    Influenza, FLUCELVAX, (age 10 mo+), MDCK, PF, 0.5mL 11/29/2022    Pneumococcal Polysaccharide ([id number]) 08/10/2017       Health Maintenance   Topic Date Due    Lipids  08/02/2022    DTaP/Tdap/Td vaccine (1 - Tdap) 02/08/2023 (Originally 4/10/1992)    Hepatitis C screen  02/08/2023 (Originally 4/10/1991)    HIV screen  02/08/2023 (Originally 4/10/1988)    COVID-19 Vaccine (1) 08/10/2023 (Originally 1975)    Colorectal Cancer Screen  04/12/2023    Depression Monitoring  08/10/2023    Flu vaccine  Completed    Pneumococcal 0-64 years Vaccine  Completed    Hepatitis A vaccine  Aged Out    Hib vaccine  Aged Out    Meningococcal (ACWY) vaccine  Aged Out       Assessment & Plan   Need for influenza vaccination  -     Influenza, FLUCELVAX, (age 10 mo+), IM, Preservative Free, 0.5 mL  Callus of foot  -     AFL - [place], Sony, DPM, Podiatry, [place]    Return if symptoms worsen or fail to improve.          --Cliff Miramontes LPN

## 2022-11-29 NOTE — PROGRESS NOTES
Name: David Marmolejo  : 1975         Chief Complaint:     Chief Complaint   Patient presents with    Sore     Bottom of left foot,painful       History of Present Illness:      David Marmolejo is a 50 y.o.  female who presents with Sore (Bottom of left foot,painful)      Barb is here today for a routine office visit. Foot Pain   The pain is present in the left foot. This is a new problem. The current episode started 1 to 4 weeks ago. There has been no history of extremity trauma. The problem occurs daily. The problem has been waxing and waning. The quality of the pain is described as aching and sharp. The pain is at a severity of 4/10. The pain is moderate. Associated symptoms include an inability to bear weight. Pertinent negatives include no fever, itching, joint locking, joint swelling, limited range of motion, numbness, stiffness or tingling. The symptoms are aggravated by activity and standing. She has tried nothing for the symptoms. The treatment provided no relief. Family history does not include rheumatoid arthritis. There is no history of osteoarthritis or rheumatoid arthritis.        Past Medical History:     Past Medical History:   Diagnosis Date    Collapsed lung     Depression     Headache(784.0)     Hypotension     Spontaneous pneumothorax     Vasodepressor syncope       Reviewed all health maintenance requirements and ordered appropriate tests  Health Maintenance Due   Topic Date Due    Lipids  2022       Past Surgical History:     Past Surgical History:   Procedure Laterality Date    CERVIX SURGERY N/A 3/17/2021    CERVIX ABLATION LASER-CO2 VAPORIZATION OF GENITAL CONDYLOMA performed by Miles Langford MD at [address] [facility]  2021    genital    HYSTERECTOMY (CERVIX STATUS UNKNOWN)      TUBAL LIGATION      VAGINA SURGERY  2022    Wide excision    VAGINA SURGERY N/A 2022    VAGINAL BIOPSY EXCISION - WIDE LOCAL performed by Janette Call Faustina Coburn [facility]        Medications:       Prior to Admission medications    Medication Sig Start Date End Date Taking? Authorizing Provider   clonazePAM (KLONOPIN) 1 MG tablet Take 1 tablet by mouth 2 times daily as needed for Anxiety for up to 30 days. 11/9/22 12/9/22 Yes KENDALL Nielson CNP   atorvastatin (LIPITOR) 20 MG tablet take 1 tablet by mouth once daily 8/10/22  Yes KENDALL Ruff CNP   DULoxetine (CYMBALTA) 30 MG extended release capsule Take 1 capsule by mouth in the morning. 8/10/22  Yes KENDALL Ruff CNP   DULoxetine (CYMBALTA) 60 MG extended release capsule Take 1 capsule by mouth in the morning. 8/10/22  Yes KENDALL Ruff CNP   QUEtiapine (SEROQUEL XR) 150 MG TB24 extended release tablet Take 1 tablet by mouth nightly 8/10/22  Yes KENDALL Ruff CNP   ARIPiprazole (ABILIFY) 10 MG tablet Take 1 tablet by mouth in the morning. 8/10/22  Yes KENDALL Ruff CNP   estrogens, conjugated, (PREMARIN) 0.625 MG tablet Take 1 tablet by mouth daily 4/7/22  Yes Beth Diaz MD   albuterol sulfate HFA (VENTOLIN HFA) 108 (90 Base) MCG/ACT inhaler Inhale 2 puffs into the lungs every 6 hours as needed for Wheezing 2/1/22  Yes KENDALL Ruff CNP        Allergies:       Codeine    Social History:     Tobacco:    reports that she has been smoking cigarettes. She has a 15.00 pack-year smoking history. She has never used smokeless tobacco.  Alcohol:      reports no history of alcohol use. Drug Use:  reports no history of drug use.     Family History:     Family History   Problem Relation Age of Onset    Diabetes Mother     Thyroid Disease Mother     Arthritis Mother     Cancer Father     Stroke Father     Asthma Father     Emphysema Father     Seizures Sister     Diabetes Sister     No Known Problems Maternal Grandmother     No Known Problems Maternal Grandfather     No Known Problems Paternal Grandmother     No Known Problems Paternal Grandfather     Other Other         no family h/o breast or ovarian cancer        Review of Systems:     Positive and Negative as described in HPI    Review of Systems   Constitutional:  Negative for fever. Musculoskeletal:  Positive for gait problem. Negative for stiffness. Skin:  Negative for itching and wound. Neurological:  Negative for tingling and numbness. Physical Exam:   Vitals:  /70 (Position: Sitting)   Pulse 74   Temp 97.7 °F (36.5 °C) (Temporal)   Resp 20   Wt 155 lb 4.8 oz (70.4 kg)   LMP 04/07/2008   SpO2 98%   BMI 25.84 kg/m²     Physical Exam  Vitals and nursing note reviewed. Constitutional:       Appearance: Normal appearance. Cardiovascular:      Rate and Rhythm: Normal rate and regular rhythm. Pulses: Normal pulses. Pulmonary:      Effort: Pulmonary effort is normal.      Breath sounds: Normal breath sounds. Musculoskeletal:        Feet:    Feet:      Left foot:      Skin integrity: Callus present. Neurological:      Mental Status: She is alert.        Data:     Lab Results   Component Value Date/Time     08/02/2021 11:33 AM    K 4.4 08/02/2021 11:33 AM     08/02/2021 11:33 AM    CO2 23 08/02/2021 11:33 AM    BUN 6 08/02/2021 11:33 AM    CREATININE 0.89 08/02/2021 11:33 AM    GLUCOSE 91 08/02/2021 11:33 AM    PROT 7.3 09/19/2017 01:39 PM    LABALBU 4.2 09/19/2017 01:39 PM    BILITOT 0.54 09/19/2017 01:39 PM    ALKPHOS 101 09/19/2017 01:39 PM    AST 15 08/02/2021 11:33 AM    ALT 7 08/02/2021 11:33 AM     Lab Results   Component Value Date/Time    WBC 10.1 08/02/2021 11:33 AM    RBC 4.28 08/02/2021 11:33 AM    HGB 13.6 08/02/2021 11:33 AM    HCT 41.5 08/02/2021 11:33 AM    MCV 97.0 08/02/2021 11:33 AM    MCH 31.8 08/02/2021 11:33 AM    MCHC 32.8 08/02/2021 11:33 AM    RDW 13.1 08/02/2021 11:33 AM     08/02/2021 11:33 AM    MPV 9.5 08/02/2021 11:33 AM     Lab Results   Component Value Date/Time    TSH 2.94 02/10/2021 08:52 AM     Lab Results   Component Value Date/Time    CHOL 227 08/02/2021 11:34 AM    HDL 42 08/02/2021 11:34 AM       Assessment/Plan:      Diagnosis Orders   1. Callus of foot  AFL - Josemanuel Cuellar DPM, Podiatry, [place]      2. Left foot pain        3. Need for influenza vaccination  Influenza, FLUCELVAX, (age 10 mo+), IM, Preservative Free, 0.5 mL        Referral to podiatry made due to pain. 1.  Barb received counseling on the following healthy behaviors: nutrition, exercise, and medication adherence  2. Patient given educational materials - see patient instructions  3. Was a self-tracking handout given in paper form or via [employer]? No  If yes, see orders or list here. 4.  Discussed use, benefit, and side effects of prescribed medications. Barriers to medication compliance addressed. All patient questions answered. Pt voiced understanding. 5.  Reviewed prior labs and health maintenance  6. Continue current medications, diet and exercise. Completed Refills   Requested Prescriptions      No prescriptions requested or ordered in this encounter         Return if symptoms worsen or fail to improve.

## 2022-11-29 NOTE — PROGRESS NOTES
After obtaining consent, and per orders of Dr. Kayy Sanchez CNP, injection of flu given in Left deltoid by Hannah Kenny LPN. Patient instructed to remain in clinic for 20 minutes afterwards, and to report any adverse reaction to me immediately. Vaccine Information Sheet, \"Influenza - Inactivated\"  given to Emmanuel Dunn, or parent/legal guardian of  Emmanuel Dunn and verbalized understanding. Patient responses:    Have you ever had a reaction to a flu vaccine? No  Are you able to eat eggs without adverse effects? Yes  Do you have any current illness? No  Have you ever had Guillian Alex Syndrome? No    Flu vaccine given per order. Please see immunization tab.

## 2022-12-19 DIAGNOSIS — F43.9 STRESS: ICD-10-CM

## 2022-12-19 RX ORDER — CLONAZEPAM 1 MG/1
1 TABLET ORAL 2 TIMES DAILY PRN
Qty: 60 TABLET | Refills: 0 | Status: SHIPPED | OUTPATIENT
Start: 2022-12-19 | End: 2023-01-18

## 2022-12-19 NOTE — TELEPHONE ENCOUNTER
Health Maintenance   Topic Date Due    Lipids  08/02/2022    DTaP/Tdap/Td vaccine (1 - Tdap) 02/08/2023 (Originally 4/10/1992)    Hepatitis C screen  02/08/2023 (Originally 4/10/1991)    HIV screen  02/08/2023 (Originally 4/10/1988)    COVID-19 Vaccine (1) 08/10/2023 (Originally 1975)    Colorectal Cancer Screen  04/12/2023    Depression Monitoring  11/29/2023    Flu vaccine  Completed    Pneumococcal 0-64 years Vaccine  Completed    Hepatitis A vaccine  Aged Out    Hib vaccine  Aged Out    Meningococcal (ACWY) vaccine  Aged Out             (applicable per patient's age: Cancer Screenings, Depression Screening, Fall Risk Screening, Immunizations)    Microalb/Crt.  Ratio (mcg/mg creat)   Date Value   09/19/2017 16     LDL Cholesterol (mg/dL)   Date Value   08/02/2021 157 (H)     AST (U/L)   Date Value   08/02/2021 15     ALT (U/L)   Date Value   08/02/2021 7     BUN (mg/dL)   Date Value   08/02/2021 6      (goal A1C is < 7)   (goal LDL is <100) need 30-50% reduction from baseline     BP Readings from Last 3 Encounters:   11/29/22 118/70   08/10/22 122/74   06/27/22 102/60    (goal /80)      All Future Testing planned in [facility]:  Lab Frequency Next Occurrence   Hemoglobin A1C Once 08/07/2022   CBC with Auto Differential Once 02/06/2023   ALT Once 02/10/2023   AST Once 32/71/4019   Basic Metabolic Panel Once 28/40/5132   Lipid Panel Once 02/06/2023       Next Visit Date:  Future Appointments   Date Time Provider Claudio Baeza   12/28/2022  1:20 PM Dora Escalante MD [facility]   2/10/2023  1:00 PM KENDALL Neal - CNP [facility]            Patient Active Problem List:     Insomnia     Depression     Anxiety associated with depression     Tobacco abuse     Dyslipidemia     Dysphagia     Family history of throat cancer     Gastroesophageal reflux disease     H/O: hysterectomy     Mild intermittent asthma without complication     Condylomata acuminata in female     Stress     LAYLA III (vulvar intraepithelial neoplasia III)     JESUS (generalized anxiety disorder)

## 2022-12-28 ENCOUNTER — OFFICE VISIT (OUTPATIENT)
Dept: OBGYN | Age: 47
End: 2022-12-28
Payer: MEDICARE

## 2022-12-28 VITALS
HEIGHT: 65 IN | DIASTOLIC BLOOD PRESSURE: 72 MMHG | WEIGHT: 153 LBS | BODY MASS INDEX: 25.49 KG/M2 | SYSTOLIC BLOOD PRESSURE: 120 MMHG

## 2022-12-28 DIAGNOSIS — D07.1 VIN III (VULVAR INTRAEPITHELIAL NEOPLASIA III): Primary | ICD-10-CM

## 2022-12-28 PROCEDURE — G8482 FLU IMMUNIZE ORDER/ADMIN: HCPCS | Performed by: OBSTETRICS & GYNECOLOGY

## 2022-12-28 PROCEDURE — G8419 CALC BMI OUT NRM PARAM NOF/U: HCPCS | Performed by: OBSTETRICS & GYNECOLOGY

## 2022-12-28 PROCEDURE — G8427 DOCREV CUR MEDS BY ELIG CLIN: HCPCS | Performed by: OBSTETRICS & GYNECOLOGY

## 2022-12-28 PROCEDURE — 99212 OFFICE O/P EST SF 10 MIN: CPT | Performed by: OBSTETRICS & GYNECOLOGY

## 2022-12-28 PROCEDURE — 4004F PT TOBACCO SCREEN RCVD TLK: CPT | Performed by: OBSTETRICS & GYNECOLOGY

## 2022-12-28 NOTE — PROGRESS NOTES
PROBLEM VISIT     Date of service: 2022    Kj Lara  Is a 50 y.o.  female    PT's PCP is: KENDALL Yu - [place]     : 1975                                             Subjective:       Patient's last menstrual period was 2008. OB History    Para Term  AB Living   3 3 3     3   SAB IAB Ectopic Molar Multiple Live Births             3      # Outcome Date GA Lbr Andres/2nd Weight Sex Delivery Anes PTL Lv   3 Term 99 40w0d   F Vag-Spont   LUANN   2 Term 11/15/95 40w0d   M Vag-Spont   LUANN   1 Term 93 40w0d   M Vag-Spont   LUANN        Social History     Tobacco Use   Smoking Status Every Day    Packs/day: 1.00    Years: 15.00    Pack years: 15.00    Types: Cigarettes   Smokeless Tobacco Never        Social History     Substance and Sexual Activity   Alcohol Use Never       Social History     Substance and Sexual Activity   Sexual Activity Yes    Partners: Male    Birth control/protection: Surgical    Comment: pt has had a hysterectomy       Allergies: Codeine    Chief Complaint   Patient presents with    Follow-up     Pt is here today for 6 month follow up of lesion removal. Procedure was 22. Last Yearly:  22     Last pap: 22    Last HPV: 22      NURSE: LMD  PE:  Vital Signs  Blood pressure 120/72, height 5' 5\" (1.651 m), weight 153 lb (69.4 kg), last menstrual period 2008, not currently breastfeeding. Labs:    No results found for this visit on 22. NURSE: Astrid Nicolas    HPI: The patient is here as she had LAYLA 2-3 over the perineal body. She had a wide local excision for this. She has been monitoring at home and noticed no changes    Yes  PT denies fever, chills, nausea and vomiting       Objective: The area of the old scar is seen over the perineal body. I see no evidence of any additional LAYLA                           Assessment and Plan          Diagnosis Orders   1.  LAYLA III (vulvar intraepithelial neoplasia III) No follow-ups on file. FF: 10 minutes    There are no Patient Instructions on file for this visit. Over 50%of time spent on counseling and care coordination on: see assessment and plan,  She was also counseled on her preventative health maintenance recommendations and follow-up.       Creig Merlin, MD,12/28/2022 1:36 PM

## 2023-01-20 DIAGNOSIS — F43.9 STRESS: ICD-10-CM

## 2023-01-22 RX ORDER — CLONAZEPAM 1 MG/1
1 TABLET ORAL 2 TIMES DAILY PRN
Qty: 60 TABLET | Refills: 0 | Status: SHIPPED | OUTPATIENT
Start: 2023-01-22 | End: 2023-02-21

## 2023-01-22 NOTE — TELEPHONE ENCOUNTER
Controlled Substance Monitoring:    Acute and Chronic Pain Monitoring:   RX Monitoring 1/22/2023   Periodic Controlled Substance Monitoring No signs of potential drug abuse or diversion identified.

## 2023-02-06 ENCOUNTER — APPOINTMENT (OUTPATIENT)
Dept: GENERAL RADIOLOGY | Age: 48
End: 2023-02-06
Payer: MEDICAID

## 2023-02-06 ENCOUNTER — HOSPITAL ENCOUNTER (EMERGENCY)
Age: 48
Discharge: HOME | End: 2023-02-06
Attending: EMERGENCY MEDICINE
Payer: MEDICAID

## 2023-02-06 VITALS
TEMPERATURE: 98.3 F | SYSTOLIC BLOOD PRESSURE: 112 MMHG | OXYGEN SATURATION: 98 % | RESPIRATION RATE: 16 BRPM | WEIGHT: 141 LBS | HEART RATE: 77 BPM | DIASTOLIC BLOOD PRESSURE: 64 MMHG | BODY MASS INDEX: 23.46 KG/M2

## 2023-02-06 DIAGNOSIS — R07.89 ATYPICAL CHEST PAIN: Primary | ICD-10-CM

## 2023-02-06 LAB
ABSOLUTE EOS #: 0.29 K/UL (ref 0–0.44)
ABSOLUTE IMMATURE GRANULOCYTE: <0.03 K/UL (ref 0–0.3)
ABSOLUTE LYMPH #: 2.02 K/UL (ref 1.1–3.7)
ABSOLUTE MONO #: 0.6 K/UL (ref 0.1–1.2)
ANION GAP SERPL CALCULATED.3IONS-SCNC: 9 MMOL/L (ref 9–17)
BASOPHILS # BLD: 1 % (ref 0–2)
BASOPHILS ABSOLUTE: 0.1 K/UL (ref 0–0.2)
BUN SERPL-MCNC: 11 MG/DL (ref 6–20)
BUN/CREAT BLD: 12 (ref 9–20)
CALCIUM SERPL-MCNC: 9.6 MG/DL (ref 8.6–10.4)
CHLORIDE SERPL-SCNC: 105 MMOL/L (ref 98–107)
CO2 SERPL-SCNC: 26 MMOL/L (ref 20–31)
CREAT SERPL-MCNC: 0.91 MG/DL (ref 0.5–0.9)
D DIMER BLD IA.RAPID-MCNC: 0.34 MG/L FEU (ref 0–0.59)
EOSINOPHILS RELATIVE PERCENT: 4 % (ref 1–4)
GFR SERPL CREATININE-BSD FRML MDRD: >60 ML/MIN/1.73M2
GLUCOSE SERPL-MCNC: 107 MG/DL (ref 70–99)
HCT VFR BLD AUTO: 39.2 % (ref 36.3–47.1)
HGB BLD-MCNC: 13.3 G/DL (ref 11.9–15.1)
IMMATURE GRANULOCYTES: 0 %
LYMPHOCYTES # BLD: 26 % (ref 24–43)
MCH RBC QN AUTO: 33.8 PG (ref 25.2–33.5)
MCHC RBC AUTO-ENTMCNC: 33.9 G/DL (ref 28.4–34.8)
MCV RBC AUTO: 99.5 FL (ref 82.6–102.9)
MONOCYTES # BLD: 8 % (ref 3–12)
NRBC AUTOMATED: 0 PER 100 WBC
PDW BLD-RTO: 13.2 % (ref 11.8–14.4)
PLATELET # BLD AUTO: 296 K/UL (ref 138–453)
PMV BLD AUTO: 9.5 FL (ref 8.1–13.5)
POTASSIUM SERPL-SCNC: 4.1 MMOL/L (ref 3.7–5.3)
RBC # BLD: 3.94 M/UL (ref 3.95–5.11)
SEG NEUTROPHILS: 61 % (ref 36–65)
SEGMENTED NEUTROPHILS ABSOLUTE COUNT: 4.64 K/UL (ref 1.5–8.1)
SODIUM SERPL-SCNC: 140 MMOL/L (ref 135–144)
TROPONIN I SERPL DL<=0.01 NG/ML-MCNC: 6 NG/L (ref 0–14)
WBC # BLD AUTO: 7.7 K/UL (ref 3.5–11.3)

## 2023-02-06 PROCEDURE — 85379 FIBRIN DEGRADATION QUANT: CPT

## 2023-02-06 PROCEDURE — 80048 BASIC METABOLIC PNL TOTAL CA: CPT

## 2023-02-06 PROCEDURE — 99285 EMERGENCY DEPT VISIT HI MDM: CPT

## 2023-02-06 PROCEDURE — 84484 ASSAY OF TROPONIN QUANT: CPT

## 2023-02-06 PROCEDURE — 71045 X-RAY EXAM CHEST 1 VIEW: CPT

## 2023-02-06 PROCEDURE — 36415 COLL VENOUS BLD VENIPUNCTURE: CPT

## 2023-02-06 PROCEDURE — 93005 ELECTROCARDIOGRAM TRACING: CPT | Performed by: EMERGENCY MEDICINE

## 2023-02-06 PROCEDURE — 85025 COMPLETE CBC W/AUTO DIFF WBC: CPT

## 2023-02-06 ASSESSMENT — PAIN - FUNCTIONAL ASSESSMENT: PAIN_FUNCTIONAL_ASSESSMENT: 0-10

## 2023-02-06 ASSESSMENT — PAIN SCALES - GENERAL: PAINLEVEL_OUTOF10: 4

## 2023-02-06 ASSESSMENT — PAIN DESCRIPTION - ORIENTATION: ORIENTATION: LEFT

## 2023-02-06 ASSESSMENT — PAIN DESCRIPTION - LOCATION: LOCATION: CHEST;BACK;ARM

## 2023-02-06 NOTE — Clinical Note
Stevo Smart was seen and treated in our emergency department on 2/6/2023. She may return to work on 02/08/2023. If you have any questions or concerns, please don't hesitate to call.       Miriam Colon MD

## 2023-02-06 NOTE — ED PROVIDER NOTES
[facility] ED  EMERGENCY DEPARTMENT ENCOUNTER      Pt Name: Brenda De Dios  MRN: [id number]  [place] 1975  Date of evaluation: 2/6/2023  Provider: Helen Darnell MD    [address] [place] [address]       Chief Complaint   Patient presents with    Chest Pain     Left upper chest pain starting around 0900. Dull aching pain radiating to left arm and back. Pain 4/10. SOB at times. HISTORY OF PRESENT ILLNESS   (Location/Symptom, Timing/Onset, Context/Setting, Quality, Duration, Modifying Factors, Severity)  Note limiting factors. Brenda De Dios is a 50 y.o. female who presents to the emergency department     75-year-old female presents to the emergency department with concerns of left-sided chest pain rating to her back into her left arm described as a squeezing sensation occurring approximately 45 minutes prior to ED arrival.  No syncopal episodes no history for cardiovascular disorders. No history for diabetes hypercholesterolemia. Discomfort is made worse with movement of her left arm there is been no history for trauma. Patient does have past medical history for pneumothorax on the left. Nursing Notes were reviewed. REVIEW OF SYSTEMS    (2-9 systems for level 4, 10 or more for level 5)     Review of Systems   All other systems reviewed and are negative. Except as noted above the remainder of the review of systems was reviewed and negative.        PAST MEDICAL HISTORY     Past Medical History:   Diagnosis Date    Collapsed lung     Depression     Headache(784.0)     Hypotension     Spontaneous pneumothorax     Vasodepressor syncope          SURGICAL HISTORY       Past Surgical History:   Procedure Laterality Date    CERVIX SURGERY N/A 3/17/2021    CERVIX ABLATION LASER-CO2 VAPORIZATION OF GENITAL CONDYLOMA performed by Adán Kidd MD at [facility]  03/17/2021    genital    HYSTERECTOMY (CERVIX STATUS UNKNOWN)      TUBAL LIGATION      VAGINA SURGERY  06/08/2022    Wide excision    VAGINA SURGERY N/A 6/8/2022    VAGINAL BIOPSY EXCISION - WIDE LOCAL performed by Fiona Smith MD at [facility]       Discharge Medication List as of 2/6/2023 12:01 PM        CONTINUE these medications which have NOT CHANGED    Details   clonazePAM (KLONOPIN) 1 MG tablet Take 1 tablet by mouth 2 times daily as needed for Anxiety for up to 30 days. , Disp-60 tablet, R-0Normal      estrogens, conjugated, (PREMARIN) 0.625 MG tablet Take 1 tablet by mouth daily, Disp-30 tablet, R-12Normal      albuterol sulfate HFA (VENTOLIN HFA) 108 (90 Base) MCG/ACT inhaler Inhale 2 puffs into the lungs every 6 hours as needed for Wheezing, Disp-1 each, R-3Normal             ALLERGIES     Codeine    FAMILY HISTORY       Family History   Problem Relation Age of Onset    Diabetes Mother     Thyroid Disease Mother     Arthritis Mother     Cancer Father     Stroke Father     Asthma Father     Emphysema Father     Seizures Sister     Diabetes Sister     No Known Problems Maternal Grandmother     No Known Problems Maternal Grandfather     No Known Problems Paternal Grandmother     No Known Problems Paternal Grandfather     Other Other         no family h/o breast or ovarian cancer           SOCIAL HISTORY       Social History     Socioeconomic History    Marital status: Legally      Spouse name: Tee Greenberg    Number of children: 3    Years of education: 11    Highest education level: 11th grade   Tobacco Use    Smoking status: Every Day     Packs/day: 1.00     Years: 15.00     Pack years: 15.00     Types: Cigarettes    Smokeless tobacco: Never   Vaping Use    Vaping Use: Never used   Substance and Sexual Activity    Alcohol use: Never    Drug use: No    Sexual activity: Yes     Partners: Male     Birth control/protection: Surgical     Comment: pt has had a hysterectomy     Social Determinants of Health     Financial Resource Strain: Unknown    Difficulty of Paying Living Expenses: Patient refused   Food Insecurity: Unknown    Worried About Running Out of Food in the Last Year: Patient refused    [address] in the Last Year: Patient refused       SCREENINGS        [place] Coma Scale  Eye Opening: Spontaneous  Best Verbal Response: Oriented  Best Motor Response: Obeys commands  [place] Coma Scale Score: 15               PHYSICAL EXAM    (up to 7 for level 4, 8 or more for level 5)     ED Triage Vitals   BP Temp Temp Source Heart Rate Resp SpO2 Height Weight   02/06/23 1038 02/06/23 1041 02/06/23 1041 02/06/23 1038 02/06/23 1038 02/06/23 1038 -- 02/06/23 1038   112/64 98.3 °F (36.8 °C) Oral 77 16 98 %  141 lb (64 kg)       Physical Exam  Constitutional:       General: She is not in acute distress. Appearance: Normal appearance. She is normal weight. She is not ill-appearing, toxic-appearing or diaphoretic. HENT:      Head: Normocephalic and atraumatic. Nose: Nose normal.      Mouth/Throat:      Mouth: Mucous membranes are moist.   Eyes:      Extraocular Movements: Extraocular movements intact. Conjunctiva/sclera: Conjunctivae normal.      Pupils: Pupils are equal, round, and reactive to light. Cardiovascular:      Rate and Rhythm: Normal rate and regular rhythm. Pulses: Normal pulses. Heart sounds: Normal heart sounds. Pulmonary:      Effort: Pulmonary effort is normal.      Breath sounds: Normal breath sounds. Abdominal:      General: Abdomen is flat. Palpations: Abdomen is soft. There is no mass. Tenderness: There is no abdominal tenderness. Musculoskeletal:      Cervical back: Normal range of motion. Right lower leg: No edema. Left lower leg: No edema. Comments: Discomfort exactly reproducible with movement of the patient's left arm    Left arm is not swollen no signs or symptomology consistent with DVT or compartment syndrome   Skin:     General: Skin is warm.       Capillary Refill: Capillary refill takes less than 2 seconds. Findings: No lesion or rash. Neurological:      General: No focal deficit present. Mental Status: She is alert and oriented to person, place, and time. Mental status is at baseline. Cranial Nerves: No cranial nerve deficit. Gait: Gait normal.      Comments: Rhythmic head movements consistent with Parkinson's       DIAGNOSTIC RESULTS     EKG: All EKG's are interpreted by the Emergency Department Physician who either signs or Co-signs this chart in the absence of a cardiologist.      RADIOLOGY:   Non-plain film images such as CT, Ultrasound and MRI are read by the radiologist. Plain radiographic images are visualized and preliminarily interpreted by the emergency physician with the below findings:      Interpretation per the Radiologist below, if available at the time of this note:    XR CHEST PORTABLE   Final Result   No acute airspace disease identified. ED BEDSIDE ULTRASOUND:   Performed by ED Physician - none    LABS:  Labs Reviewed   BASIC METABOLIC PANEL - Abnormal; Notable for the following components:       Result Value    Glucose 107 (*)     Creatinine 0.91 (*)     All other components within normal limits   CBC WITH AUTO DIFFERENTIAL - Abnormal; Notable for the following components:    RBC 3.94 (*)     MCH 33.8 (*)     All other components within normal limits   TROPONIN   D-DIMER, QUANTITATIVE       All other labs were within normal range or not returned as of this dictation.     EMERGENCY DEPARTMENT COURSE and DIFFERENTIAL DIAGNOSIS/MDM:   Vitals:    Vitals:    02/06/23 1038 02/06/23 1041   BP: 112/64    Pulse: 77    Resp: 16    Temp:  98.3 °F (36.8 °C)   TempSrc:  Oral   SpO2: 98%    Weight: 141 lb (64 kg)        1)  Number and Complexity of Problems  Problem List This Visit: Chest pain    Differential Diagnosis: Musculoskeletal, pneumothorax, pulmonary embolism, DVT, ACS syndrome,    Diagnoses Considered but Do Not Suspect: Thoracic aortic dissection    Pertinent Comorbid Conditions: In a    2)  Data Reviewed  My EKG interpretation: Performed at 1034-ventricular rate 67, poor R wave progression V1 to V2 no ST segment elevation no dysrhythmia    Decision Rules/Scores utilized: Heart score    Tests considered but not ordered and why: CT chest not indicated as the patient is comfortable vital signs are stable    External Documents Reviewed: N/A    Imaging that is independently reviewed and interpreted by me as: In a    See more data below for the lab and radiology tests and orders. 3)  Treatment and Disposition    Patient repeat assessment: Patient remains alert comfortable hemodynamically stable afebrile and well oxygenated with pulse ox under 90% on room air    Disposition discussion with patient/family: Disposition Home and timely follow-up recommended the patient is also in agreement    Case discussed with consulting clinician: N/A    Social determinants of health impacting treatment or disposition: N/A    Shared Decision Making: Was a shared decision making with the patient          MDM  Number of Diagnoses or Management Options  Atypical chest pain  Diagnosis management comments: 78-year-old patient presents emerged part with concerns of left-sided chest discomfort beginning 45 minutes prior to ED arrival.  There are some radiation discomfort to her back into her left arm-discomfort is exactly reproducible with movement of her left arm. Diagnostic considerations pneumothorax atypical chest pain ACS syndrome pulmonary embolism    Laboratory studies and imaging studies have been reviewed along with EKG interpretation with the patient    Given history, physical exam and diagnostic testing I feel this patient does not have an acute medical emergency.   Patient has been adequately risk stratified and using reasonable practice norms it is felt the patient is unlikely to be suffering from significant cardiovascular pathology; ACS, pulmonary embolism, pneumothorax, pneumonia, dissection of the aorta, pericardial tamponade. Patient's heart score is 1    Patient offered Toradol however she declined    Patient knowledges discharge diagnosis the importance of timely follow-up and have no additional questions or concerns she is released in a stable condition          REASSESSMENT       ED Course as of 02/07/23 1028   Mon Feb 06, 2023   1159 XR CHEST PORTABLE  Chest x-ray no acute processes the radiologist read [RS]      ED Course User Index  [RS] Taryn Freeman MD         CRITICAL CARE TIME   Total Critical Care time was minutes, excluding separately reportable procedures. There was a high probability of clinically significant/life threatening deterioration in the patient's condition which required my urgent intervention. CONSULTS:  None    PROCEDURES:  Unless otherwise noted below, none     Procedures    FINAL IMPRESSION      1. Atypical chest pain          DISPOSITION/PLAN   DISPOSITION Decision To Discharge 02/06/2023 12:00:10 PM      PATIENT REFERRED TO:  KENDALL Suarez CNP  [facility]  [phone]    Call in 2 days      DISCHARGE MEDICATIONS:  Discharge Medication List as of 2/6/2023 12:01 PM        Controlled Substances Monitoring:     RX Monitoring 1/22/2023   Periodic Controlled Substance Monitoring No signs of potential drug abuse or diversion identified.        (Please note that portions of this note were completed with a voice recognition program.  Efforts were made to edit the dictations but occasionally words are mis-transcribed.)    Taryn Freeman MD (electronically signed)  Attending Emergency Physician           Taryn Freeman MD  02/07/23 1028

## 2023-02-07 ENCOUNTER — TELEPHONE (OUTPATIENT)
Dept: PRIMARY CARE CLINIC | Age: 48
End: 2023-02-07

## 2023-02-07 LAB
EKG ATRIAL RATE: 67 BPM
EKG P AXIS: 40 DEGREES
EKG P-R INTERVAL: 144 MS
EKG Q-T INTERVAL: 408 MS
EKG QRS DURATION: 82 MS
EKG QTC CALCULATION (BAZETT): 431 MS
EKG R AXIS: 35 DEGREES
EKG T AXIS: 36 DEGREES
EKG VENTRICULAR RATE: 67 BPM

## 2023-02-07 PROCEDURE — 93010 ELECTROCARDIOGRAM REPORT: CPT | Performed by: INTERNAL MEDICINE

## 2023-02-07 NOTE — TELEPHONE ENCOUNTER
[facility] Transitions Initial Follow Up Call    Outreach made within 2 business days of discharge: Yes    Patient: Sonam Gomez Patient : 1975   MRN: [id number]  Reason for Admission: There are no discharge diagnoses documented for the most recent discharge. Discharge Date: 23       Spoke with: Barb Cornell department/facility: [facility]     TCM Interactive Patient Contact:  Was patient able to fill all prescriptions: Yes  Was patient instructed to bring all medications to the follow-up visit: Yes  Is patient taking all medications as directed in the discharge summary?  Yes  Does patient understand their discharge instructions: Yes  Does patient have questions or concerns that need addressed prior to 7-14 day follow up office visit: no    Scheduled appointment with PCP within 7-14 days    Follow Up  Future Appointments   Date Time Provider Claudio Baeza   2023 10:00 AM KENDALL Worley CNP [facility]   2023 12:20 PM KENDALL Worley CNP [facility]   2023 10:00 AM MD [facility]       Raegan Delgadillo MA

## 2023-02-08 ENCOUNTER — OFFICE VISIT (OUTPATIENT)
Dept: PRIMARY CARE CLINIC | Age: 48
End: 2023-02-08
Payer: MEDICAID

## 2023-02-08 VITALS
OXYGEN SATURATION: 97 % | BODY MASS INDEX: 26.66 KG/M2 | DIASTOLIC BLOOD PRESSURE: 64 MMHG | SYSTOLIC BLOOD PRESSURE: 106 MMHG | HEIGHT: 65 IN | HEART RATE: 73 BPM | WEIGHT: 160 LBS | TEMPERATURE: 97.6 F | RESPIRATION RATE: 18 BRPM

## 2023-02-08 DIAGNOSIS — R07.89 CHEST WALL PAIN: Primary | ICD-10-CM

## 2023-02-08 DIAGNOSIS — F33.1 MODERATE EPISODE OF RECURRENT MAJOR DEPRESSIVE DISORDER (HCC): ICD-10-CM

## 2023-02-08 DIAGNOSIS — R07.9 INTERMITTENT CHEST PAIN: ICD-10-CM

## 2023-02-08 PROCEDURE — 99214 OFFICE O/P EST MOD 30 MIN: CPT | Performed by: NURSE PRACTITIONER

## 2023-02-08 RX ORDER — QUETIAPINE FUMARATE 50 MG/1
50 TABLET, EXTENDED RELEASE ORAL NIGHTLY
Qty: 30 TABLET | Refills: 2 | Status: SHIPPED | OUTPATIENT
Start: 2023-02-08

## 2023-02-08 SDOH — ECONOMIC STABILITY: FOOD INSECURITY: WITHIN THE PAST 12 MONTHS, YOU WORRIED THAT YOUR FOOD WOULD RUN OUT BEFORE YOU GOT MONEY TO BUY MORE.: NEVER TRUE

## 2023-02-08 SDOH — ECONOMIC STABILITY: HOUSING INSECURITY
IN THE LAST 12 MONTHS, WAS THERE A TIME WHEN YOU DID NOT HAVE A STEADY PLACE TO SLEEP OR SLEPT IN A SHELTER (INCLUDING NOW)?: NO

## 2023-02-08 SDOH — ECONOMIC STABILITY: FOOD INSECURITY: WITHIN THE PAST 12 MONTHS, THE FOOD YOU BOUGHT JUST DIDN'T LAST AND YOU DIDN'T HAVE MONEY TO GET MORE.: NEVER TRUE

## 2023-02-08 SDOH — ECONOMIC STABILITY: INCOME INSECURITY: HOW HARD IS IT FOR YOU TO PAY FOR THE VERY BASICS LIKE FOOD, HOUSING, MEDICAL CARE, AND HEATING?: NOT HARD AT ALL

## 2023-02-08 ASSESSMENT — ENCOUNTER SYMPTOMS
VOMITING: 0
RHINORRHEA: 0
WHEEZING: 0
CONSTIPATION: 0
ABDOMINAL PAIN: 0
SHORTNESS OF BREATH: 0
NAUSEA: 0
COUGH: 0
VISUAL CHANGE: 0
DIARRHEA: 0
SORE THROAT: 0

## 2023-02-08 ASSESSMENT — PATIENT HEALTH QUESTIONNAIRE - PHQ9
10. IF YOU CHECKED OFF ANY PROBLEMS, HOW DIFFICULT HAVE THESE PROBLEMS MADE IT FOR YOU TO DO YOUR WORK, TAKE CARE OF THINGS AT HOME, OR GET ALONG WITH OTHER PEOPLE: 0
9. THOUGHTS THAT YOU WOULD BE BETTER OFF DEAD, OR OF HURTING YOURSELF: 0
6. FEELING BAD ABOUT YOURSELF - OR THAT YOU ARE A FAILURE OR HAVE LET YOURSELF OR YOUR FAMILY DOWN: 0
SUM OF ALL RESPONSES TO PHQ9 QUESTIONS 1 & 2: 0
7. TROUBLE CONCENTRATING ON THINGS, SUCH AS READING THE NEWSPAPER OR WATCHING TELEVISION: 0
SUM OF ALL RESPONSES TO PHQ QUESTIONS 1-9: 0
3. TROUBLE FALLING OR STAYING ASLEEP: 0
1. LITTLE INTEREST OR PLEASURE IN DOING THINGS: 0
4. FEELING TIRED OR HAVING LITTLE ENERGY: 0
2. FEELING DOWN, DEPRESSED OR HOPELESS: 0
8. MOVING OR SPEAKING SO SLOWLY THAT OTHER PEOPLE COULD HAVE NOTICED. OR THE OPPOSITE, BEING SO FIGETY OR RESTLESS THAT YOU HAVE BEEN MOVING AROUND A LOT MORE THAN USUAL: 0
SUM OF ALL RESPONSES TO PHQ QUESTIONS 1-9: 0
SUM OF ALL RESPONSES TO PHQ QUESTIONS 1-9: 0
5. POOR APPETITE OR OVEREATING: 0
SUM OF ALL RESPONSES TO PHQ QUESTIONS 1-9: 0

## 2023-02-08 NOTE — PROGRESS NOTES
Name: Mireille Cherry  : 1975         Chief Complaint:     Chief Complaint   Patient presents with    Follow-up     Patient was in [facility] on  for chest pain and states it hasn't gotten and better but not getting any worse. It radiates from her chest down her left arm and down her back        History of Present Illness:      Mireille Cherry is a 50 y.o.  female who presents with Follow-up (Patient was in [facility] on  for chest pain and states it hasn't gotten and better but not getting any worse. It radiates from her chest down her left arm and down her back )      Barb is here today for an ER follow up visit. ER course:  59-year-old female presents to the emergency department with concerns of left-sided chest pain rating to her back into her left arm described as a squeezing sensation occurring approximately 45 minutes prior to ED arrival.  No syncopal episodes no history for cardiovascular disorders. No history for diabetes hypercholesterolemia. Discomfort is made worse with movement of her left arm there is been no history for trauma. Patient does have past medical history for pneumothorax on the left. Atypical chest pain  Diagnosis management comments: 59-year-old patient presents emerged part with concerns of left-sided chest discomfort beginning 45 minutes prior to ED arrival.  There are some radiation discomfort to her back into her left arm-discomfort is exactly reproducible with movement of her left arm. Diagnostic considerations pneumothorax atypical chest pain ACS syndrome pulmonary embolism     Laboratory studies and imaging studies have been reviewed along with EKG interpretation with the patient     Given history, physical exam and diagnostic testing I feel this patient does not have an acute medical emergency.   Patient has been adequately risk stratified and using reasonable practice norms it is felt the patient is unlikely to be suffering from significant cardiovascular pathology; ACS, pulmonary embolism, pneumothorax, pneumonia, dissection of the aorta, pericardial tamponade. Patient's heart score is 1     Patient offered Toradol however she declined    UPDATE 02/08/2023-patient states she is really not having chest pain but is having significant anxiety and depression. She states that her son is going to be going to long term and she is taking care of her grandchildren. She states she has these episodes of chest pain when she becomes upset. She states she thought it was anxiety when she went to the emergency department but was scared. Mental Health Problem  The primary symptoms include dysphoric mood, negative symptoms and somatic symptoms. The primary symptoms do not include delusions, hallucinations, bizarre behavior or disorganized speech. The current episode started more than 1 month ago. This is a chronic problem. The dysphoric mood began more than 2 weeks ago. The mood has been worsening since its onset. She characterizes the problem as moderate. The mood includes feelings of sadness, irritability and tearfulness. Her change in mood was precipitated by a stressful event. The negative symptoms began more than 1 month ago. The negative symptoms appear to have been worsening since their onset. The negative symptoms include anhedonia and attention impairment. The somatic symptoms began more than 1 month ago. The somatic symptoms have been worsening since their onset. The symptoms are moderate. Somatic symptoms include fatigue. Somatic symptoms do not include headaches, abdominal pain or constipation. The onset of the illness is precipitated by a stressful event and emotional stress. The degree of incapacity that she is experiencing as a consequence of her illness is moderate. Sequelae of the illness include harmed interpersonal relations. Additional symptoms of the illness include anhedonia, insomnia, fatigue, agitation, attention impairment and distractible. Additional symptoms of the illness do not include hypersomnia, appetite change, unexpected weight change, psychomotor retardation, feelings of worthlessness, euphoric mood, increased goal-directed activity, flight of ideas, inflated self-esteem, decreased need for sleep, poor judgment, visual change, headaches, abdominal pain or seizures. She does not admit to suicidal ideas. She does not have a plan to attempt suicide. She does not contemplate harming herself. She has not already injured self. She does not contemplate injuring another person. She has not already  injured another person. Risk factors that are present for mental illness include a history of mental illness and a family history of mental illness. 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 Authorizing Provider   QUEtiapine (SEROQUEL XR) 50 MG extended release tablet Take 1 tablet by mouth nightly 2/8/23  Yes KENDALL Zhou - CNP   clonazePAM (KLONOPIN) 1 MG tablet Take 1 tablet by mouth 2 times daily as needed for Anxiety for up to 30 days. 1/22/23 2/21/23 Yes Alice Notice Might, APRN - CNP   estrogens, conjugated, (PREMARIN) 0.625 MG tablet Take 1 tablet by mouth daily 4/7/22  Yes Toby Wright MD   albuterol sulfate HFA (VENTOLIN HFA) 108 (90 Base) MCG/ACT inhaler Inhale 2 puffs into the lungs every 6 hours as needed for Wheezing 2/1/22  Yes Alice Notice Might, APRN - CNP        Allergies:       Codeine    Social History:     Tobacco:    reports that she has been smoking cigarettes. She has a 15.00 pack-year smoking history. She has never used smokeless tobacco.  Alcohol:      reports no history of alcohol use. Drug Use:  reports no history of drug use. Family History:     Family History   Problem Relation Age of Onset    Diabetes Mother     Thyroid Disease Mother     Arthritis Mother     Cancer Father     Stroke Father     Asthma Father     Emphysema Father     Seizures Sister     Diabetes Sister     No Known Problems Maternal Grandmother     No Known Problems Maternal Grandfather     No Known Problems Paternal Grandmother     No Known Problems Paternal Grandfather     Other Other         no family h/o breast or ovarian cancer        Review of Systems:     Positive and Negative as described in HPI    Review of Systems   Constitutional:  Positive for fatigue. Negative for appetite change, chills, fever and unexpected weight change. HENT:  Negative for congestion, rhinorrhea and sore throat. Eyes:  Negative for visual disturbance. Respiratory:  Negative for cough, shortness of breath and wheezing. Cardiovascular:  Positive for chest pain. Negative for palpitations. Gastrointestinal:  Negative for abdominal pain, constipation, diarrhea, nausea and vomiting. Genitourinary:  Negative for difficulty urinating and dysuria. Musculoskeletal:  Negative for gait problem, neck pain and neck stiffness. Skin:  Negative for rash. Neurological:  Negative for dizziness, seizures, syncope, light-headedness and headaches.    Psychiatric/Behavioral: Positive for agitation, decreased concentration, dysphoric mood and sleep disturbance. Negative for behavioral problems, confusion, hallucinations, self-injury and suicidal ideas. The patient is nervous/anxious and has insomnia. The patient is not hyperactive. Physical Exam:   Vitals:  /64 (Site: Left Upper Arm, Position: Sitting)   Pulse 73   Temp 97.6 °F (36.4 °C) (Temporal)   Resp 18   Ht 5' 5\" (1.651 m)   Wt 160 lb (72.6 kg)   LMP 04/07/2008   SpO2 97%   BMI 26.63 kg/m²     Physical Exam  Constitutional:       General: She is not in acute distress. Appearance: Normal appearance. She is not ill-appearing. HENT:      Mouth/Throat:      Mouth: Mucous membranes are moist.   Eyes:      General: No scleral icterus. Conjunctiva/sclera: Conjunctivae normal.   Neck:      Vascular: No carotid bruit. Cardiovascular:      Rate and Rhythm: Normal rate and regular rhythm. Heart sounds: Normal heart sounds. No murmur heard. Pulmonary:      Effort: Pulmonary effort is normal. No respiratory distress. Breath sounds: Normal breath sounds. No wheezing. Abdominal:      General: Bowel sounds are normal. There is no distension. Palpations: Abdomen is soft. There is no mass. Tenderness: There is no abdominal tenderness. Musculoskeletal:      Cervical back: Normal range of motion and neck supple. Lymphadenopathy:      Cervical: No cervical adenopathy. Skin:     General: Skin is warm and dry. Findings: No rash. Neurological:      Mental Status: She is alert and oriented to person, place, and time. Psychiatric:         Attention and Perception: Attention normal.         Mood and Affect: Mood is anxious. Speech: Speech normal.         Behavior: Behavior normal. Behavior is cooperative. Thought Content: Thought content normal. Thought content does not include homicidal or suicidal ideation. Thought content does not include homicidal or suicidal plan. Cognition and Memory: Cognition normal.         Judgment: Judgment normal.       Data:     Lab Results   Component Value Date/Time     02/06/2023 11:10 AM    K 4.1 02/06/2023 11:10 AM     02/06/2023 11:10 AM    CO2 26 02/06/2023 11:10 AM    BUN 11 02/06/2023 11:10 AM    CREATININE 0.91 02/06/2023 11:10 AM    GLUCOSE 107 02/06/2023 11:10 AM    PROT 7.3 09/19/2017 01:39 PM    LABALBU 4.2 09/19/2017 01:39 PM    BILITOT 0.54 09/19/2017 01:39 PM    ALKPHOS 101 09/19/2017 01:39 PM    AST 15 08/02/2021 11:33 AM    ALT 7 08/02/2021 11:33 AM     Lab Results   Component Value Date/Time    WBC 7.7 02/06/2023 11:10 AM    RBC 3.94 02/06/2023 11:10 AM    HGB 13.3 02/06/2023 11:10 AM    HCT 39.2 02/06/2023 11:10 AM    MCV 99.5 02/06/2023 11:10 AM    MCH 33.8 02/06/2023 11:10 AM    MCHC 33.9 02/06/2023 11:10 AM    RDW 13.2 02/06/2023 11:10 AM     02/06/2023 11:10 AM    MPV 9.5 02/06/2023 11:10 AM     Lab Results   Component Value Date/Time    TSH 2.94 02/10/2021 08:52 AM     Lab Results   Component Value Date/Time    CHOL 227 08/02/2021 11:34 AM    HDL 42 08/02/2021 11:34 AM       Assessment/Plan:      Diagnosis Orders   1. Chest wall pain        2. Intermittent chest pain  Stress test, myoview      3. Moderate episode of recurrent major depressive disorder (HCC)  QUEtiapine (SEROQUEL XR) 50 MG extended release tablet        We will still proceed with stress testing. She is to report back to the emergency department for any chest pain or concerns that she has. We will start quetiapine XR 50 mg nightly. She will continue other meds as directed. We will see her back in 3 weeks for reevaluation, sooner if any issues. 1.  Barb received counseling on the following healthy behaviors: nutrition, exercise, medication adherence, and tobacco cessation  2. Patient given educational materials - see patient instructions  3. Was a self-tracking handout given in paper form or via [employer]?  No  If yes, see orders or list here. 4.  Discussed use, benefit, and side effects of prescribed medications. Barriers to medication compliance addressed. All patient questions answered. Pt voiced understanding. 5.  Reviewed prior labs and health maintenance  6. Continue current medications, diet and exercise. Completed Refills   Requested Prescriptions     Signed Prescriptions Disp Refills    QUEtiapine (SEROQUEL XR) 50 MG extended release tablet 30 tablet 2     Sig: Take 1 tablet by mouth nightly         Return in about 3 weeks (around 3/1/2023) for Recheck.

## 2023-02-14 ENCOUNTER — APPOINTMENT (OUTPATIENT)
Dept: GENERAL RADIOLOGY | Age: 48
End: 2023-02-14
Payer: MEDICAID

## 2023-02-14 ENCOUNTER — HOSPITAL ENCOUNTER (EMERGENCY)
Age: 48
Discharge: ANOTHER HOSPITAL | End: 2023-02-14
Attending: EMERGENCY MEDICINE
Payer: MEDICAID

## 2023-02-14 ENCOUNTER — HOSPITAL ENCOUNTER (INPATIENT)
Age: 48
LOS: 1 days | Discharge: ANOTHER HOSPITAL | End: 2023-02-15
Attending: EMERGENCY MEDICINE | Admitting: INTERNAL MEDICINE
Payer: MEDICAID

## 2023-02-14 VITALS
HEIGHT: 65 IN | OXYGEN SATURATION: 100 % | SYSTOLIC BLOOD PRESSURE: 114 MMHG | DIASTOLIC BLOOD PRESSURE: 65 MMHG | RESPIRATION RATE: 24 BRPM | BODY MASS INDEX: 26.66 KG/M2 | HEART RATE: 64 BPM | WEIGHT: 160 LBS | TEMPERATURE: 96.8 F

## 2023-02-14 DIAGNOSIS — I21.3 ST ELEVATION MYOCARDIAL INFARCTION (STEMI), UNSPECIFIED ARTERY (HCC): ICD-10-CM

## 2023-02-14 DIAGNOSIS — E87.20 METABOLIC ACIDOSIS: ICD-10-CM

## 2023-02-14 DIAGNOSIS — I46.9 CARDIAC ARREST (HCC): Primary | ICD-10-CM

## 2023-02-14 DIAGNOSIS — I95.89 OTHER SPECIFIED HYPOTENSION: ICD-10-CM

## 2023-02-14 DIAGNOSIS — E87.29 RESPIRATORY ACIDOSIS: ICD-10-CM

## 2023-02-14 DIAGNOSIS — J96.90 RESPIRATORY FAILURE, UNSPECIFIED CHRONICITY, UNSPECIFIED WHETHER WITH HYPOXIA OR HYPERCAPNIA (HCC): ICD-10-CM

## 2023-02-14 DIAGNOSIS — J80 ARDS (ADULT RESPIRATORY DISTRESS SYNDROME) (HCC): ICD-10-CM

## 2023-02-14 DIAGNOSIS — T68.XXXA: ICD-10-CM

## 2023-02-14 PROBLEM — I70.221 CRITICAL LIMB ISCHEMIA OF RIGHT LOWER EXTREMITY (HCC): Status: ACTIVE | Noted: 2023-02-14

## 2023-02-14 PROBLEM — N17.0 ACUTE RENAL FAILURE WITH TUBULAR NECROSIS (HCC): Status: ACTIVE | Noted: 2023-02-14

## 2023-02-14 LAB
ABSOLUTE EOS #: 0.34 K/UL (ref 0–0.44)
ABSOLUTE IMMATURE GRANULOCYTE: 0.03 K/UL (ref 0–0.3)
ABSOLUTE LYMPH #: 4.42 K/UL (ref 1.1–3.7)
ABSOLUTE MONO #: 0.8 K/UL (ref 0.1–1.2)
ACTION: NORMAL
ACTION: NORMAL
ALBUMIN SERPL-MCNC: 0.9 G/DL (ref 3.5–5.2)
ALBUMIN SERPL-MCNC: 2.9 G/DL (ref 3.5–5.2)
ALBUMIN SERPL-MCNC: 4.2 G/DL (ref 3.5–5.2)
ALBUMIN/GLOBULIN RATIO: 1.4 (ref 1–2.5)
ALBUMIN/GLOBULIN RATIO: 1.5 (ref 1–2.5)
ALBUMIN/GLOBULIN RATIO: 1.7 (ref 1–2.5)
ALLEN TEST: ABNORMAL
ALP SERPL-CCNC: 129 U/L (ref 35–104)
ALP SERPL-CCNC: 50 U/L (ref 35–104)
ALP SERPL-CCNC: 96 U/L (ref 35–104)
ALT SERPL-CCNC: 133 U/L (ref 5–33)
ALT SERPL-CCNC: 14 U/L (ref 5–33)
ALT SERPL-CCNC: 366 U/L (ref 5–33)
AMORPHOUS: ABNORMAL
ANION GAP SERPL CALCULATED.3IONS-SCNC: 13 MMOL/L (ref 9–17)
ANION GAP SERPL CALCULATED.3IONS-SCNC: 13 MMOL/L (ref 9–17)
ANION GAP SERPL CALCULATED.3IONS-SCNC: 16 MMOL/L (ref 9–17)
ANION GAP SERPL CALCULATED.3IONS-SCNC: 18 MMOL/L (ref 9–17)
ANION GAP SERPL CALCULATED.3IONS-SCNC: 20 MMOL/L (ref 9–17)
ANION GAP SERPL CALCULATED.3IONS-SCNC: ABNORMAL MMOL/L (ref 9–17)
ANION GAP: 12 MMOL/L (ref 7–16)
ANION GAP: 16 MMOL/L (ref 7–16)
ANION GAP: 16 MMOL/L (ref 7–16)
ANION GAP: 17 MMOL/L (ref 7–16)
AST SERPL-CCNC: 156 U/L
AST SERPL-CCNC: 27 U/L
AST SERPL-CCNC: 863 U/L
BASOPHILS # BLD: 1 % (ref 0–2)
BASOPHILS ABSOLUTE: 0.12 K/UL (ref 0–0.2)
BETA-HYDROXYBUTYRATE: 0.23 MMOL/L (ref 0.02–0.27)
BILIRUB SERPL-MCNC: 0.2 MG/DL (ref 0.3–1.2)
BILIRUB SERPL-MCNC: 0.9 MG/DL (ref 0.3–1.2)
BILIRUB SERPL-MCNC: <0.1 MG/DL (ref 0.3–1.2)
BILIRUBIN URINE: NEGATIVE
BNP SERPL-MCNC: 44 PG/ML
BUN SERPL-MCNC: 12 MG/DL (ref 6–20)
BUN SERPL-MCNC: 15 MG/DL (ref 6–20)
BUN SERPL-MCNC: 16 MG/DL (ref 6–20)
BUN SERPL-MCNC: 16 MG/DL (ref 6–20)
BUN SERPL-MCNC: 17 MG/DL (ref 6–20)
BUN SERPL-MCNC: 4 MG/DL (ref 6–20)
BUN/CREAT BLD: 13 (ref 9–20)
CA-I BLD-SCNC: 0.78 MMOL/L (ref 1.13–1.33)
CA-I BLD-SCNC: 0.98 MMOL/L (ref 1.13–1.33)
CA-I BLD-SCNC: 1.07 MMOL/L (ref 1.13–1.33)
CALCIUM SERPL-MCNC: 2 MG/DL (ref 8.6–10.4)
CALCIUM SERPL-MCNC: 6.9 MG/DL (ref 8.6–10.4)
CALCIUM SERPL-MCNC: 7 MG/DL (ref 8.6–10.4)
CALCIUM SERPL-MCNC: 7.3 MG/DL (ref 8.6–10.4)
CALCIUM SERPL-MCNC: 7.4 MG/DL (ref 8.6–10.4)
CALCIUM SERPL-MCNC: 9.9 MG/DL (ref 8.6–10.4)
CASTS UA: ABNORMAL /LPF (ref 0–2)
CASTS UA: ABNORMAL /LPF (ref 0–2)
CHLORIDE SERPL-SCNC: 105 MMOL/L (ref 98–107)
CHLORIDE SERPL-SCNC: 106 MMOL/L (ref 98–107)
CHLORIDE SERPL-SCNC: 107 MMOL/L (ref 98–107)
CHLORIDE SERPL-SCNC: 109 MMOL/L (ref 98–107)
CHLORIDE SERPL-SCNC: 110 MMOL/L (ref 98–107)
CHLORIDE SERPL-SCNC: 131 MMOL/L (ref 98–107)
CO2 SERPL-SCNC: 14 MMOL/L (ref 20–31)
CO2 SERPL-SCNC: 15 MMOL/L (ref 20–31)
CO2 SERPL-SCNC: 17 MMOL/L (ref 20–31)
CO2 SERPL-SCNC: 18 MMOL/L (ref 20–31)
CO2 SERPL-SCNC: 21 MMOL/L (ref 20–31)
CO2 SERPL-SCNC: <6 MMOL/L (ref 20–31)
COLOR: YELLOW
CREAT SERPL-MCNC: 0.93 MG/DL (ref 0.5–0.9)
CREAT SERPL-MCNC: 1.01 MG/DL (ref 0.5–0.9)
CREAT SERPL-MCNC: 1.12 MG/DL (ref 0.5–0.9)
CREAT SERPL-MCNC: 1.18 MG/DL (ref 0.5–0.9)
CREAT SERPL-MCNC: 1.55 MG/DL (ref 0.5–0.9)
CREAT SERPL-MCNC: <0.2 MG/DL (ref 0.5–0.9)
DATE AND TIME: NORMAL
DATE AND TIME: NORMAL
EGFR, POC: 41 ML/MIN/1.73M2
EGFR, POC: 48 ML/MIN/1.73M2
EGFR, POC: 49 ML/MIN/1.73M2
EGFR, POC: 58 ML/MIN/1.73M2
EKG ATRIAL RATE: 109 BPM
EKG ATRIAL RATE: 65 BPM
EKG ATRIAL RATE: 93 BPM
EKG P AXIS: 51 DEGREES
EKG P AXIS: 61 DEGREES
EKG P AXIS: 69 DEGREES
EKG P-R INTERVAL: 170 MS
EKG P-R INTERVAL: 172 MS
EKG P-R INTERVAL: 178 MS
EKG Q-T INTERVAL: 416 MS
EKG Q-T INTERVAL: 418 MS
EKG Q-T INTERVAL: 454 MS
EKG QRS DURATION: 102 MS
EKG QRS DURATION: 134 MS
EKG QRS DURATION: 84 MS
EKG QTC CALCULATION (BAZETT): 472 MS
EKG QTC CALCULATION (BAZETT): 519 MS
EKG QTC CALCULATION (BAZETT): 560 MS
EKG R AXIS: 41 DEGREES
EKG R AXIS: 46 DEGREES
EKG R AXIS: 60 DEGREES
EKG T AXIS: 41 DEGREES
EKG T AXIS: 44 DEGREES
EKG T AXIS: 63 DEGREES
EKG VENTRICULAR RATE: 109 BPM
EKG VENTRICULAR RATE: 65 BPM
EKG VENTRICULAR RATE: 93 BPM
EOSINOPHILS RELATIVE PERCENT: 3 % (ref 1–4)
EPITHELIAL CELLS UA: ABNORMAL /HPF (ref 0–5)
FIO2: 100
GFR SERPL CREATININE-BSD FRML MDRD: 41 ML/MIN/1.73M2
GFR SERPL CREATININE-BSD FRML MDRD: 57 ML/MIN/1.73M2
GFR SERPL CREATININE-BSD FRML MDRD: >60 ML/MIN/1.73M2
GFR SERPL CREATININE-BSD FRML MDRD: ABNORMAL ML/MIN/1.73M2
GLUCOSE BLD-MCNC: 101 MG/DL (ref 65–105)
GLUCOSE BLD-MCNC: 115 MG/DL (ref 65–105)
GLUCOSE BLD-MCNC: 141 MG/DL (ref 65–105)
GLUCOSE BLD-MCNC: 169 MG/DL (ref 65–105)
GLUCOSE BLD-MCNC: 218 MG/DL (ref 74–100)
GLUCOSE BLD-MCNC: 224 MG/DL (ref 65–105)
GLUCOSE BLD-MCNC: 229 MG/DL (ref 65–105)
GLUCOSE BLD-MCNC: 337 MG/DL (ref 74–100)
GLUCOSE BLD-MCNC: 366 MG/DL (ref 74–100)
GLUCOSE BLD-MCNC: 45 MG/DL (ref 74–100)
GLUCOSE BLD-MCNC: 463 MG/DL (ref 74–100)
GLUCOSE BLD-MCNC: 56 MG/DL (ref 65–105)
GLUCOSE BLD-MCNC: 59 MG/DL (ref 65–105)
GLUCOSE BLD-MCNC: 86 MG/DL (ref 65–105)
GLUCOSE BLD-MCNC: 90 MG/DL (ref 65–105)
GLUCOSE SERPL-MCNC: 106 MG/DL (ref 70–99)
GLUCOSE SERPL-MCNC: 131 MG/DL (ref 70–99)
GLUCOSE SERPL-MCNC: 142 MG/DL (ref 70–99)
GLUCOSE SERPL-MCNC: 279 MG/DL (ref 70–99)
GLUCOSE SERPL-MCNC: 352 MG/DL (ref 70–99)
GLUCOSE SERPL-MCNC: 366 MG/DL (ref 70–99)
GLUCOSE UR STRIP.AUTO-MCNC: ABNORMAL MG/DL
HCO3 ARTERIAL: 11.6 MMOL/L (ref 22–26)
HCT VFR BLD AUTO: 33.3 % (ref 36.3–47.1)
HCT VFR BLD AUTO: 38.1 % (ref 36.3–47.1)
HCT VFR BLD AUTO: 39.6 % (ref 36.3–47.1)
HGB BLD-MCNC: 10.4 G/DL (ref 11.9–15.1)
HGB BLD-MCNC: 12.3 G/DL (ref 11.9–15.1)
HGB BLD-MCNC: 13.3 G/DL (ref 11.9–15.1)
IMMATURE GRANULOCYTES: 0 %
INR PPP: 1
KETONES UR STRIP.AUTO-MCNC: NEGATIVE MG/DL
LACTIC ACID, WHOLE BLOOD: 0.7 MMOL/L (ref 0.7–2.1)
LACTIC ACID, WHOLE BLOOD: 5.5 MMOL/L (ref 0.7–2.1)
LACTIC ACID, WHOLE BLOOD: 7.2 MMOL/L (ref 0.7–2.1)
LACTIC ACID, WHOLE BLOOD: 8.5 MMOL/L (ref 0.7–2.1)
LACTIC ACID, WHOLE BLOOD: 9.9 MMOL/L (ref 0.7–2.1)
LEUKOCYTE ESTERASE UR QL STRIP.AUTO: ABNORMAL
LV EF: 20 %
LV EF: 20 %
LVEF MODALITY: NORMAL
LVEF MODALITY: NORMAL
LYMPHOCYTES # BLD: 40 % (ref 24–43)
MAGNESIUM SERPL-MCNC: 0.6 MG/DL (ref 1.6–2.6)
MAGNESIUM SERPL-MCNC: 1.6 MG/DL (ref 1.6–2.6)
MAGNESIUM SERPL-MCNC: 1.8 MG/DL (ref 1.6–2.6)
MAGNESIUM SERPL-MCNC: 1.8 MG/DL (ref 1.6–2.6)
MAGNESIUM SERPL-MCNC: 1.9 MG/DL (ref 1.6–2.6)
MCH RBC QN AUTO: 32.8 PG (ref 25.2–33.5)
MCH RBC QN AUTO: 32.8 PG (ref 25.2–33.5)
MCH RBC QN AUTO: 33.8 PG (ref 25.2–33.5)
MCHC RBC AUTO-ENTMCNC: 31.1 G/DL (ref 28.4–34.8)
MCHC RBC AUTO-ENTMCNC: 31.2 G/DL (ref 28.4–34.8)
MCHC RBC AUTO-ENTMCNC: 34.9 G/DL (ref 28.4–34.8)
MCV RBC AUTO: 105 FL (ref 82.6–102.9)
MCV RBC AUTO: 105.6 FL (ref 82.6–102.9)
MCV RBC AUTO: 96.9 FL (ref 82.6–102.9)
MODE: ABNORMAL
MODE: ABNORMAL
MODE: AC
MONOCYTES # BLD: 7 % (ref 3–12)
NEGATIVE BASE EXCESS, ART: 14 (ref 0–2)
NEGATIVE BASE EXCESS, ART: 14 (ref 0–2)
NEGATIVE BASE EXCESS, ART: 16 (ref 0–2)
NEGATIVE BASE EXCESS, ART: 18.1 MMOL/L (ref 0–2)
NEGATIVE BASE EXCESS, ART: 21 (ref 0–2)
NEGATIVE BASE EXCESS, ART: 7 (ref 0–2)
NITRITE UR QL STRIP.AUTO: NEGATIVE
NOTIFY: NORMAL
NOTIFY: NORMAL
NRBC AUTOMATED: 0 PER 100 WBC
NRBC AUTOMATED: 0 PER 100 WBC
NRBC AUTOMATED: 0.1 PER 100 WBC
O2 DEVICE/FLOW/%: ABNORMAL
O2 SAT, ARTERIAL: 85.3 % (ref 95–98)
PARTIAL THROMBOPLASTIN TIME: 82.5 SEC (ref 20.5–30.5)
PARTIAL THROMBOPLASTIN TIME: 93.5 SEC (ref 20.5–30.5)
PARTIAL THROMBOPLASTIN TIME: >120 SEC (ref 20.5–30.5)
PATIENT TEMP: 34.9
PCO2 ARTERIAL: 40.8 MMHG (ref 35–45)
PDW BLD-RTO: 13.2 % (ref 11.8–14.4)
PDW BLD-RTO: 13.7 % (ref 11.8–14.4)
PDW BLD-RTO: 14.1 % (ref 11.8–14.4)
PH ARTERIAL: 7.07 (ref 7.35–7.45)
PHOSPHATE SERPL-MCNC: 3.9 MG/DL (ref 2.6–4.5)
PLATELET # BLD AUTO: 249 K/UL (ref 138–453)
PLATELET # BLD AUTO: 287 K/UL (ref 138–453)
PLATELET # BLD AUTO: 310 K/UL (ref 138–453)
PMV BLD AUTO: 9.2 FL (ref 8.1–13.5)
PMV BLD AUTO: 9.5 FL (ref 8.1–13.5)
PMV BLD AUTO: 9.9 FL (ref 8.1–13.5)
PO2 ARTERIAL: 68 MMHG (ref 80–100)
POC BUN: 15 MG/DL (ref 8–26)
POC BUN: 16 MG/DL (ref 8–26)
POC BUN: 18 MG/DL (ref 8–26)
POC BUN: 19 MG/DL (ref 8–26)
POC CHLORIDE: 110 MMOL/L (ref 98–107)
POC CHLORIDE: 111 MMOL/L (ref 98–107)
POC CHLORIDE: 113 MMOL/L (ref 98–107)
POC CHLORIDE: 114 MMOL/L (ref 98–107)
POC CREATININE: 1.15 MG/DL (ref 0.51–1.19)
POC CREATININE: 1.34 MG/DL (ref 0.51–1.19)
POC CREATININE: 1.35 MG/DL (ref 0.51–1.19)
POC CREATININE: 1.56 MG/DL (ref 0.51–1.19)
POC HCO3: 14.3 MMOL/L (ref 21–28)
POC HCO3: 14.3 MMOL/L (ref 21–28)
POC HCO3: 16.4 MMOL/L (ref 21–28)
POC HCO3: 16.5 MMOL/L (ref 21–28)
POC HCO3: 19.7 MMOL/L (ref 21–28)
POC HEMATOCRIT: 32 % (ref 36–46)
POC HEMATOCRIT: 33 % (ref 36–46)
POC HEMATOCRIT: 38 % (ref 36–46)
POC HEMATOCRIT: 40 % (ref 36–46)
POC HEMOGLOBIN: 11 G/DL (ref 12–16)
POC HEMOGLOBIN: 11.3 G/DL (ref 12–16)
POC HEMOGLOBIN: 12.9 G/DL (ref 12–16)
POC HEMOGLOBIN: 13.5 G/DL (ref 12–16)
POC IONIZED CALCIUM: 1.01 MMOL/L (ref 1.15–1.33)
POC IONIZED CALCIUM: 1.15 MMOL/L (ref 1.15–1.33)
POC IONIZED CALCIUM: 1.16 MMOL/L (ref 1.15–1.33)
POC IONIZED CALCIUM: 1.18 MMOL/L (ref 1.15–1.33)
POC LACTIC ACID: 12.01 MMOL/L (ref 0.56–1.39)
POC LACTIC ACID: 5.17 MMOL/L (ref 0.56–1.39)
POC LACTIC ACID: 5.35 MMOL/L (ref 0.56–1.39)
POC LACTIC ACID: 6.58 MMOL/L (ref 0.56–1.39)
POC LACTIC ACID: 6.84 MMOL/L (ref 0.56–1.39)
POC O2 SATURATION: 100 % (ref 94–98)
POC O2 SATURATION: 20 % (ref 94–98)
POC O2 SATURATION: 81 % (ref 94–98)
POC O2 SATURATION: 85 % (ref 94–98)
POC O2 SATURATION: 98 % (ref 94–98)
POC PCO2 TEMP: 53 MM HG
POC PCO2: 42.5 MM HG (ref 35–48)
POC PCO2: 53.8 MM HG (ref 35–48)
POC PCO2: 57.3 MM HG (ref 35–48)
POC PCO2: 57.9 MM HG (ref 35–48)
POC PCO2: 90.3 MM HG (ref 35–48)
POC PH TEMP: 7.09
POC PH: 6.81 (ref 7.35–7.45)
POC PH: 7.03 (ref 7.35–7.45)
POC PH: 7.06 (ref 7.35–7.45)
POC PH: 7.07 (ref 7.35–7.45)
POC PH: 7.27 (ref 7.35–7.45)
POC PO2 TEMP: 56 MM HG
POC PO2: 147.3 MM HG (ref 83–108)
POC PO2: 263.1 MM HG (ref 83–108)
POC PO2: 28.1 MM HG (ref 83–108)
POC PO2: 64.4 MM HG (ref 83–108)
POC PO2: 70.3 MM HG (ref 83–108)
POC POTASSIUM: 3.5 MMOL/L (ref 3.5–4.5)
POC POTASSIUM: 3.8 MMOL/L (ref 3.5–4.5)
POC POTASSIUM: 3.8 MMOL/L (ref 3.5–4.5)
POC POTASSIUM: 4.2 MMOL/L (ref 3.5–4.5)
POC POTASSIUM: 4.6 MMOL/L (ref 3.5–4.5)
POC SODIUM: 141 MMOL/L (ref 138–146)
POC SODIUM: 141 MMOL/L (ref 138–146)
POC SODIUM: 143 MMOL/L (ref 138–146)
POC SODIUM: 146 MMOL/L (ref 138–146)
POC TCO2: 15 MMOL/L (ref 22–30)
POC TCO2: 16 MMOL/L (ref 22–30)
POC TCO2: 17 MMOL/L (ref 22–30)
POC TCO2: 19 MMOL/L (ref 22–30)
POTASSIUM SERPL-SCNC: 3.2 MMOL/L (ref 3.7–5.3)
POTASSIUM SERPL-SCNC: 3.5 MMOL/L (ref 3.7–5.3)
POTASSIUM SERPL-SCNC: 3.8 MMOL/L (ref 3.7–5.3)
POTASSIUM SERPL-SCNC: 3.9 MMOL/L (ref 3.7–5.3)
POTASSIUM SERPL-SCNC: 4.1 MMOL/L (ref 3.7–5.3)
POTASSIUM SERPL-SCNC: <1.5 MMOL/L (ref 3.7–5.3)
PROT SERPL-MCNC: 1.5 G/DL (ref 6.4–8.3)
PROT SERPL-MCNC: 4.6 G/DL (ref 6.4–8.3)
PROT SERPL-MCNC: 7.1 G/DL (ref 6.4–8.3)
PROT UR STRIP.AUTO-MCNC: 5.5 MG/DL (ref 5–8)
PROT UR STRIP.AUTO-MCNC: ABNORMAL MG/DL
PROTHROMBIN TIME: 12.6 SEC (ref 11.5–14.2)
RBC # BLD: 3.17 M/UL (ref 3.95–5.11)
RBC # BLD: 3.75 M/UL (ref 3.95–5.11)
RBC # BLD: 3.93 M/UL (ref 3.95–5.11)
RBC CLUMPS #/AREA URNS AUTO: ABNORMAL /HPF (ref 0–2)
READ BACK: YES
READ BACK: YES
REASON FOR REJECTION: NORMAL
REASON FOR REJECTION: NORMAL
SAMPLE SITE: ABNORMAL
SEG NEUTROPHILS: 49 % (ref 36–65)
SEGMENTED NEUTROPHILS ABSOLUTE COUNT: 5.38 K/UL (ref 1.5–8.1)
SODIUM SERPL-SCNC: 137 MMOL/L (ref 135–144)
SODIUM SERPL-SCNC: 138 MMOL/L (ref 135–144)
SODIUM SERPL-SCNC: 140 MMOL/L (ref 135–144)
SODIUM SERPL-SCNC: 142 MMOL/L (ref 135–144)
SODIUM SERPL-SCNC: 143 MMOL/L (ref 135–144)
SODIUM SERPL-SCNC: 145 MMOL/L (ref 135–144)
SPECIFIC GRAVITY UA: 1.02 (ref 1–1.03)
TROPONIN I SERPL DL<=0.01 NG/ML-MCNC: 20 NG/L (ref 0–14)
TROPONIN I SERPL DL<=0.01 NG/ML-MCNC: 2258 NG/L (ref 0–14)
TROPONIN I SERPL DL<=0.01 NG/ML-MCNC: 2784 NG/L (ref 0–14)
TROPONIN I SERPL DL<=0.01 NG/ML-MCNC: 53 NG/L (ref 0–14)
TROPONIN I SERPL DL<=0.01 NG/ML-MCNC: 943 NG/L (ref 0–14)
TROPONIN I SERPL DL<=0.01 NG/ML-MCNC: NORMAL NG/L (ref 0–14)
TSH SERPL-ACNC: 7.29 UIU/ML (ref 0.3–5)
TURBIDITY: ABNORMAL
URINE HGB: ABNORMAL
UROBILINOGEN, URINE: NORMAL
WBC # BLD AUTO: 11.1 K/UL (ref 3.5–11.3)
WBC # BLD AUTO: 33.2 K/UL (ref 3.5–11.3)
WBC # BLD AUTO: 39.6 K/UL (ref 3.5–11.3)
WBC UA: ABNORMAL /HPF (ref 0–5)
ZZ NTE CLEAN UP: ORDERED TEST: NORMAL
ZZ NTE CLEAN UP: ORDERED TEST: NORMAL
ZZ NTE WITH NAME CLEAN UP: SPECIMEN SOURCE: NORMAL
ZZ NTE WITH NAME CLEAN UP: SPECIMEN SOURCE: NORMAL

## 2023-02-14 PROCEDURE — 82803 BLOOD GASES ANY COMBINATION: CPT

## 2023-02-14 PROCEDURE — 6360000002 HC RX W HCPCS: Performed by: EMERGENCY MEDICINE

## 2023-02-14 PROCEDURE — 027034Z DILATION OF CORONARY ARTERY, ONE ARTERY WITH DRUG-ELUTING INTRALUMINAL DEVICE, PERCUTANEOUS APPROACH: ICD-10-PCS | Performed by: INTERNAL MEDICINE

## 2023-02-14 PROCEDURE — 92941 PRQ TRLML REVSC TOT OCCL AMI: CPT

## 2023-02-14 PROCEDURE — 93010 ELECTROCARDIOGRAM REPORT: CPT | Performed by: INTERNAL MEDICINE

## 2023-02-14 PROCEDURE — 99285 EMERGENCY DEPT VISIT HI MDM: CPT

## 2023-02-14 PROCEDURE — 80053 COMPREHEN METABOLIC PANEL: CPT

## 2023-02-14 PROCEDURE — 33967 INSERT I-AORT PERCUT DEVICE: CPT

## 2023-02-14 PROCEDURE — 6370000000 HC RX 637 (ALT 250 FOR IP): Performed by: STUDENT IN AN ORGANIZED HEALTH CARE EDUCATION/TRAINING PROGRAM

## 2023-02-14 PROCEDURE — 6360000002 HC RX W HCPCS: Performed by: STUDENT IN AN ORGANIZED HEALTH CARE EDUCATION/TRAINING PROGRAM

## 2023-02-14 PROCEDURE — 93458 L HRT ARTERY/VENTRICLE ANGIO: CPT

## 2023-02-14 PROCEDURE — 82565 ASSAY OF CREATININE: CPT

## 2023-02-14 PROCEDURE — 94003 VENT MGMT INPAT SUBQ DAY: CPT

## 2023-02-14 PROCEDURE — 2500000003 HC RX 250 WO HCPCS: Performed by: STUDENT IN AN ORGANIZED HEALTH CARE EDUCATION/TRAINING PROGRAM

## 2023-02-14 PROCEDURE — 99255 IP/OBS CONSLTJ NEW/EST HI 80: CPT | Performed by: SURGERY

## 2023-02-14 PROCEDURE — 5A02110 ASSISTANCE WITH CARDIAC OUTPUT USING BALLOON PUMP, INTERMITTENT: ICD-10-PCS | Performed by: INTERNAL MEDICINE

## 2023-02-14 PROCEDURE — 82947 ASSAY GLUCOSE BLOOD QUANT: CPT

## 2023-02-14 PROCEDURE — 82330 ASSAY OF CALCIUM: CPT

## 2023-02-14 PROCEDURE — 2500000003 HC RX 250 WO HCPCS

## 2023-02-14 PROCEDURE — 83735 ASSAY OF MAGNESIUM: CPT

## 2023-02-14 PROCEDURE — 36620 INSERTION CATHETER ARTERY: CPT

## 2023-02-14 PROCEDURE — 94002 VENT MGMT INPAT INIT DAY: CPT

## 2023-02-14 PROCEDURE — 71045 X-RAY EXAM CHEST 1 VIEW: CPT

## 2023-02-14 PROCEDURE — 99223 1ST HOSP IP/OBS HIGH 75: CPT | Performed by: INTERNAL MEDICINE

## 2023-02-14 PROCEDURE — 2580000003 HC RX 258: Performed by: EMERGENCY MEDICINE

## 2023-02-14 PROCEDURE — 6360000002 HC RX W HCPCS: Performed by: INTERNAL MEDICINE

## 2023-02-14 PROCEDURE — 2500000003 HC RX 250 WO HCPCS: Performed by: INTERNAL MEDICINE

## 2023-02-14 PROCEDURE — 2580000003 HC RX 258: Performed by: STUDENT IN AN ORGANIZED HEALTH CARE EDUCATION/TRAINING PROGRAM

## 2023-02-14 PROCEDURE — 93306 TTE W/DOPPLER COMPLETE: CPT

## 2023-02-14 PROCEDURE — C1874 STENT, COATED/COV W/DEL SYS: HCPCS

## 2023-02-14 PROCEDURE — 80048 BASIC METABOLIC PNL TOTAL CA: CPT

## 2023-02-14 PROCEDURE — 99291 CRITICAL CARE FIRST HOUR: CPT | Performed by: INTERNAL MEDICINE

## 2023-02-14 PROCEDURE — 2580000003 HC RX 258: Performed by: INTERNAL MEDICINE

## 2023-02-14 PROCEDURE — 2000000000 HC ICU R&B

## 2023-02-14 PROCEDURE — 5A1D80Z PERFORMANCE OF URINARY FILTRATION, PROLONGED INTERMITTENT, 6-18 HOURS PER DAY: ICD-10-PCS | Performed by: INTERNAL MEDICINE

## 2023-02-14 PROCEDURE — 6360000002 HC RX W HCPCS

## 2023-02-14 PROCEDURE — 06HN33Z INSERTION OF INFUSION DEVICE INTO LEFT FEMORAL VEIN, PERCUTANEOUS APPROACH: ICD-10-PCS | Performed by: STUDENT IN AN ORGANIZED HEALTH CARE EDUCATION/TRAINING PROGRAM

## 2023-02-14 PROCEDURE — 96368 THER/DIAG CONCURRENT INF: CPT

## 2023-02-14 PROCEDURE — 85025 COMPLETE CBC W/AUTO DIFF WBC: CPT

## 2023-02-14 PROCEDURE — 36600 WITHDRAWAL OF ARTERIAL BLOOD: CPT

## 2023-02-14 PROCEDURE — 81001 URINALYSIS AUTO W/SCOPE: CPT

## 2023-02-14 PROCEDURE — 85014 HEMATOCRIT: CPT

## 2023-02-14 PROCEDURE — C1769 GUIDE WIRE: HCPCS

## 2023-02-14 PROCEDURE — 84484 ASSAY OF TROPONIN QUANT: CPT

## 2023-02-14 PROCEDURE — 5A1945Z RESPIRATORY VENTILATION, 24-96 CONSECUTIVE HOURS: ICD-10-PCS | Performed by: STUDENT IN AN ORGANIZED HEALTH CARE EDUCATION/TRAINING PROGRAM

## 2023-02-14 PROCEDURE — 83880 ASSAY OF NATRIURETIC PEPTIDE: CPT

## 2023-02-14 PROCEDURE — 82805 BLOOD GASES W/O2 SATURATION: CPT

## 2023-02-14 PROCEDURE — 87086 URINE CULTURE/COLONY COUNT: CPT

## 2023-02-14 PROCEDURE — 84520 ASSAY OF UREA NITROGEN: CPT

## 2023-02-14 PROCEDURE — 2709999900 HC NON-CHARGEABLE SUPPLY

## 2023-02-14 PROCEDURE — C1757 CATH, THROMBECTOMY/EMBOLECT: HCPCS

## 2023-02-14 PROCEDURE — 4A023N7 MEASUREMENT OF CARDIAC SAMPLING AND PRESSURE, LEFT HEART, PERCUTANEOUS APPROACH: ICD-10-PCS | Performed by: INTERNAL MEDICINE

## 2023-02-14 PROCEDURE — 84443 ASSAY THYROID STIM HORMONE: CPT

## 2023-02-14 PROCEDURE — C1725 CATH, TRANSLUMIN NON-LASER: HCPCS

## 2023-02-14 PROCEDURE — 36415 COLL VENOUS BLD VENIPUNCTURE: CPT

## 2023-02-14 PROCEDURE — 93005 ELECTROCARDIOGRAM TRACING: CPT

## 2023-02-14 PROCEDURE — 92950 HEART/LUNG RESUSCITATION CPR: CPT

## 2023-02-14 PROCEDURE — 6370000000 HC RX 637 (ALT 250 FOR IP)

## 2023-02-14 PROCEDURE — 93005 ELECTROCARDIOGRAM TRACING: CPT | Performed by: STUDENT IN AN ORGANIZED HEALTH CARE EDUCATION/TRAINING PROGRAM

## 2023-02-14 PROCEDURE — 02C03ZZ EXTIRPATION OF MATTER FROM CORONARY ARTERY, ONE ARTERY, PERCUTANEOUS APPROACH: ICD-10-PCS | Performed by: INTERNAL MEDICINE

## 2023-02-14 PROCEDURE — 96376 TX/PRO/DX INJ SAME DRUG ADON: CPT

## 2023-02-14 PROCEDURE — 90945 DIALYSIS ONE EVALUATION: CPT

## 2023-02-14 PROCEDURE — 37799 UNLISTED PX VASCULAR SURGERY: CPT

## 2023-02-14 PROCEDURE — 31500 INSERT EMERGENCY AIRWAY: CPT

## 2023-02-14 PROCEDURE — 5A0221D ASSISTANCE WITH CARDIAC OUTPUT USING IMPELLER PUMP, CONTINUOUS: ICD-10-PCS | Performed by: INTERNAL MEDICINE

## 2023-02-14 PROCEDURE — 84100 ASSAY OF PHOSPHORUS: CPT

## 2023-02-14 PROCEDURE — 0BH17EZ INSERTION OF ENDOTRACHEAL AIRWAY INTO TRACHEA, VIA NATURAL OR ARTIFICIAL OPENING: ICD-10-PCS | Performed by: STUDENT IN AN ORGANIZED HEALTH CARE EDUCATION/TRAINING PROGRAM

## 2023-02-14 PROCEDURE — C1894 INTRO/SHEATH, NON-LASER: HCPCS

## 2023-02-14 PROCEDURE — C1889 IMPLANT/INSERT DEVICE, NOC: HCPCS

## 2023-02-14 PROCEDURE — 02HA3RZ INSERTION OF SHORT-TERM EXTERNAL HEART ASSIST SYSTEM INTO HEART, PERCUTANEOUS APPROACH: ICD-10-PCS | Performed by: INTERNAL MEDICINE

## 2023-02-14 PROCEDURE — 85610 PROTHROMBIN TIME: CPT

## 2023-02-14 PROCEDURE — 96375 TX/PRO/DX INJ NEW DRUG ADDON: CPT

## 2023-02-14 PROCEDURE — 83605 ASSAY OF LACTIC ACID: CPT

## 2023-02-14 PROCEDURE — B2151ZZ FLUOROSCOPY OF LEFT HEART USING LOW OSMOLAR CONTRAST: ICD-10-PCS | Performed by: INTERNAL MEDICINE

## 2023-02-14 PROCEDURE — 2580000003 HC RX 258

## 2023-02-14 PROCEDURE — 96365 THER/PROPH/DIAG IV INF INIT: CPT

## 2023-02-14 PROCEDURE — 85730 THROMBOPLASTIN TIME PARTIAL: CPT

## 2023-02-14 PROCEDURE — 2500000003 HC RX 250 WO HCPCS: Performed by: EMERGENCY MEDICINE

## 2023-02-14 PROCEDURE — 80051 ELECTROLYTE PANEL: CPT

## 2023-02-14 PROCEDURE — 5A2204Z RESTORATION OF CARDIAC RHYTHM, SINGLE: ICD-10-PCS | Performed by: STUDENT IN AN ORGANIZED HEALTH CARE EDUCATION/TRAINING PROGRAM

## 2023-02-14 PROCEDURE — B2111ZZ FLUOROSCOPY OF MULTIPLE CORONARY ARTERIES USING LOW OSMOLAR CONTRAST: ICD-10-PCS | Performed by: INTERNAL MEDICINE

## 2023-02-14 PROCEDURE — 74018 RADEX ABDOMEN 1 VIEW: CPT

## 2023-02-14 PROCEDURE — 85027 COMPLETE CBC AUTOMATED: CPT

## 2023-02-14 PROCEDURE — 82010 KETONE BODYS QUAN: CPT

## 2023-02-14 PROCEDURE — 36556 INSERT NON-TUNNEL CV CATH: CPT | Performed by: SURGERY

## 2023-02-14 PROCEDURE — 05HM33Z INSERTION OF INFUSION DEVICE INTO RIGHT INTERNAL JUGULAR VEIN, PERCUTANEOUS APPROACH: ICD-10-PCS | Performed by: SURGERY

## 2023-02-14 PROCEDURE — 76937 US GUIDE VASCULAR ACCESS: CPT | Performed by: SURGERY

## 2023-02-14 PROCEDURE — 84132 ASSAY OF SERUM POTASSIUM: CPT

## 2023-02-14 PROCEDURE — 96374 THER/PROPH/DIAG INJ IV PUSH: CPT

## 2023-02-14 RX ORDER — PROPOFOL 10 MG/ML
5-50 INJECTION, EMULSION INTRAVENOUS CONTINUOUS
Status: DISCONTINUED | OUTPATIENT
Start: 2023-02-14 | End: 2023-02-15 | Stop reason: HOSPADM

## 2023-02-14 RX ORDER — PROPOFOL 10 MG/ML
INJECTION, EMULSION INTRAVENOUS
Status: COMPLETED
Start: 2023-02-14 | End: 2023-02-14

## 2023-02-14 RX ORDER — POTASSIUM CHLORIDE 7.45 MG/ML
10 INJECTION INTRAVENOUS PRN
Status: CANCELLED | OUTPATIENT
Start: 2023-02-14

## 2023-02-14 RX ORDER — EPINEPHRINE 0.1 MG/ML
SYRINGE (ML) INJECTION DAILY PRN
Status: COMPLETED | OUTPATIENT
Start: 2023-02-14 | End: 2023-02-14

## 2023-02-14 RX ORDER — NOREPINEPHRINE BIT/0.9 % NACL 16MG/250ML
INFUSION BOTTLE (ML) INTRAVENOUS
Status: DISCONTINUED
Start: 2023-02-14 | End: 2023-02-14 | Stop reason: WASHOUT

## 2023-02-14 RX ORDER — MAGNESIUM SULFATE IN WATER 40 MG/ML
2000 INJECTION, SOLUTION INTRAVENOUS ONCE
Status: COMPLETED | OUTPATIENT
Start: 2023-02-15 | End: 2023-02-15

## 2023-02-14 RX ORDER — HEPARIN SODIUM 1000 [USP'U]/ML
4000 INJECTION, SOLUTION INTRAVENOUS; SUBCUTANEOUS ONCE
Status: COMPLETED | OUTPATIENT
Start: 2023-02-14 | End: 2023-02-14

## 2023-02-14 RX ORDER — FUROSEMIDE 10 MG/ML
20 INJECTION INTRAMUSCULAR; INTRAVENOUS 2 TIMES DAILY
Status: DISCONTINUED | OUTPATIENT
Start: 2023-02-14 | End: 2023-02-15 | Stop reason: HOSPADM

## 2023-02-14 RX ORDER — MIDAZOLAM HYDROCHLORIDE 2 MG/2ML
2 INJECTION, SOLUTION INTRAMUSCULAR; INTRAVENOUS ONCE
Status: COMPLETED | OUTPATIENT
Start: 2023-02-14 | End: 2023-02-14

## 2023-02-14 RX ORDER — HEPARIN SODIUM 1000 [USP'U]/ML
60 INJECTION, SOLUTION INTRAVENOUS; SUBCUTANEOUS ONCE
Status: DISCONTINUED | OUTPATIENT
Start: 2023-02-14 | End: 2023-02-14

## 2023-02-14 RX ORDER — MIDAZOLAM HYDROCHLORIDE 1 MG/ML
1-10 INJECTION, SOLUTION INTRAVENOUS CONTINUOUS
Status: DISCONTINUED | OUTPATIENT
Start: 2023-02-14 | End: 2023-02-15 | Stop reason: HOSPADM

## 2023-02-14 RX ORDER — SODIUM CHLORIDE 9 MG/ML
INJECTION, SOLUTION INTRAVENOUS CONTINUOUS
Status: CANCELLED | OUTPATIENT
Start: 2023-02-14

## 2023-02-14 RX ORDER — ATROPINE SULFATE 0.1 MG/ML
INJECTION INTRAVENOUS
Status: DISPENSED
Start: 2023-02-14 | End: 2023-02-14

## 2023-02-14 RX ORDER — VECURONIUM BROMIDE 1 MG/ML
INJECTION, POWDER, LYOPHILIZED, FOR SOLUTION INTRAVENOUS
Status: DISPENSED
Start: 2023-02-14 | End: 2023-02-14

## 2023-02-14 RX ORDER — AMIODARONE HYDROCHLORIDE 50 MG/ML
INJECTION, SOLUTION INTRAVENOUS DAILY PRN
Status: COMPLETED | OUTPATIENT
Start: 2023-02-14 | End: 2023-02-14

## 2023-02-14 RX ORDER — ASPIRIN 300 MG/1
300 SUPPOSITORY RECTAL ONCE
Status: COMPLETED | OUTPATIENT
Start: 2023-02-14 | End: 2023-02-14

## 2023-02-14 RX ORDER — ETOMIDATE 2 MG/ML
INJECTION INTRAVENOUS DAILY PRN
Status: COMPLETED | OUTPATIENT
Start: 2023-02-14 | End: 2023-02-14

## 2023-02-14 RX ORDER — PHENYLEPHRINE HCL IN 0.9% NACL 50MG/250ML
10-300 PLASTIC BAG, INJECTION (ML) INTRAVENOUS CONTINUOUS
Status: DISCONTINUED | OUTPATIENT
Start: 2023-02-14 | End: 2023-02-14

## 2023-02-14 RX ORDER — HEPARIN SODIUM 1000 [USP'U]/ML
1500 INJECTION, SOLUTION INTRAVENOUS; SUBCUTANEOUS PRN
Status: DISCONTINUED | OUTPATIENT
Start: 2023-02-14 | End: 2023-02-15 | Stop reason: HOSPADM

## 2023-02-14 RX ORDER — ASPIRIN 81 MG/1
81 TABLET, CHEWABLE ORAL DAILY
Status: DISCONTINUED | OUTPATIENT
Start: 2023-02-15 | End: 2023-02-15 | Stop reason: HOSPADM

## 2023-02-14 RX ORDER — POTASSIUM CHLORIDE 29.8 MG/ML
20 INJECTION INTRAVENOUS ONCE
Status: COMPLETED | OUTPATIENT
Start: 2023-02-15 | End: 2023-02-15

## 2023-02-14 RX ORDER — HEPARIN SODIUM AND DEXTROSE 10000; 5 [USP'U]/100ML; G/100ML
5-30 INJECTION INTRAVENOUS CONTINUOUS
Status: DISCONTINUED | OUTPATIENT
Start: 2023-02-14 | End: 2023-02-15 | Stop reason: HOSPADM

## 2023-02-14 RX ORDER — NOREPINEPHRINE BIT/0.9 % NACL 16MG/250ML
1-100 INFUSION BOTTLE (ML) INTRAVENOUS CONTINUOUS
Status: DISCONTINUED | OUTPATIENT
Start: 2023-02-14 | End: 2023-02-14

## 2023-02-14 RX ORDER — MAGNESIUM SULFATE IN WATER 40 MG/ML
2000 INJECTION, SOLUTION INTRAVENOUS ONCE
Status: COMPLETED | OUTPATIENT
Start: 2023-02-14 | End: 2023-02-14

## 2023-02-14 RX ORDER — 0.9 % SODIUM CHLORIDE 0.9 %
15 INTRAVENOUS SOLUTION INTRAVENOUS ONCE
Status: CANCELLED | OUTPATIENT
Start: 2023-02-14 | End: 2023-02-14

## 2023-02-14 RX ORDER — POTASSIUM CHLORIDE 29.8 MG/ML
20 INJECTION INTRAVENOUS ONCE
Status: COMPLETED | OUTPATIENT
Start: 2023-02-14 | End: 2023-02-14

## 2023-02-14 RX ORDER — SUCCINYLCHOLINE CHLORIDE 20 MG/ML
INJECTION INTRAMUSCULAR; INTRAVENOUS DAILY PRN
Status: COMPLETED | OUTPATIENT
Start: 2023-02-14 | End: 2023-02-14

## 2023-02-14 RX ORDER — MAGNESIUM SULFATE 1 G/100ML
1000 INJECTION INTRAVENOUS PRN
Status: CANCELLED | OUTPATIENT
Start: 2023-02-14

## 2023-02-14 RX ORDER — DEXTROSE MONOHYDRATE 100 MG/ML
INJECTION, SOLUTION INTRAVENOUS CONTINUOUS PRN
Status: DISCONTINUED | OUTPATIENT
Start: 2023-02-14 | End: 2023-02-15 | Stop reason: HOSPADM

## 2023-02-14 RX ORDER — HEPARIN SODIUM 1000 [USP'U]/ML
2000 INJECTION, SOLUTION INTRAVENOUS; SUBCUTANEOUS PRN
Status: DISCONTINUED | OUTPATIENT
Start: 2023-02-14 | End: 2023-02-15

## 2023-02-14 RX ORDER — ACETAMINOPHEN 325 MG/1
650 TABLET ORAL EVERY 4 HOURS PRN
Status: DISCONTINUED | OUTPATIENT
Start: 2023-02-14 | End: 2023-02-15 | Stop reason: SDUPTHER

## 2023-02-14 RX ORDER — HEPARIN SODIUM AND DEXTROSE 10000; 5 [USP'U]/100ML; G/100ML
5-30 INJECTION INTRAVENOUS CONTINUOUS
Status: DISCONTINUED | OUTPATIENT
Start: 2023-02-14 | End: 2023-02-14 | Stop reason: SDUPTHER

## 2023-02-14 RX ORDER — HEPARIN SODIUM 1000 [USP'U]/ML
4000 INJECTION, SOLUTION INTRAVENOUS; SUBCUTANEOUS PRN
Status: DISCONTINUED | OUTPATIENT
Start: 2023-02-14 | End: 2023-02-15

## 2023-02-14 RX ORDER — SODIUM CHLORIDE 0.9 % (FLUSH) 0.9 %
5-40 SYRINGE (ML) INJECTION PRN
Status: DISCONTINUED | OUTPATIENT
Start: 2023-02-14 | End: 2023-02-15 | Stop reason: HOSPADM

## 2023-02-14 RX ORDER — HEPARIN SODIUM 1000 [USP'U]/ML
1600 INJECTION, SOLUTION INTRAVENOUS; SUBCUTANEOUS PRN
Status: DISCONTINUED | OUTPATIENT
Start: 2023-02-14 | End: 2023-02-15 | Stop reason: HOSPADM

## 2023-02-14 RX ORDER — MIDAZOLAM HYDROCHLORIDE 1 MG/ML
INJECTION INTRAMUSCULAR; INTRAVENOUS
Status: COMPLETED
Start: 2023-02-14 | End: 2023-02-14

## 2023-02-14 RX ORDER — SODIUM CHLORIDE 9 MG/ML
INJECTION, SOLUTION INTRAVENOUS PRN
Status: DISCONTINUED | OUTPATIENT
Start: 2023-02-14 | End: 2023-02-15 | Stop reason: HOSPADM

## 2023-02-14 RX ORDER — DEXTROSE AND SODIUM CHLORIDE 5; .45 G/100ML; G/100ML
INJECTION, SOLUTION INTRAVENOUS CONTINUOUS PRN
Status: CANCELLED | OUTPATIENT
Start: 2023-02-14

## 2023-02-14 RX ORDER — DOPAMINE HYDROCHLORIDE 160 MG/100ML
1-20 INJECTION, SOLUTION INTRAVENOUS CONTINUOUS
Status: DISCONTINUED | OUTPATIENT
Start: 2023-02-14 | End: 2023-02-15 | Stop reason: HOSPADM

## 2023-02-14 RX ORDER — CALCIUM GLUCONATE 20 MG/ML
2000 INJECTION, SOLUTION INTRAVENOUS ONCE
Status: COMPLETED | OUTPATIENT
Start: 2023-02-14 | End: 2023-02-15

## 2023-02-14 RX ORDER — SODIUM CHLORIDE 0.9 % (FLUSH) 0.9 %
5-40 SYRINGE (ML) INJECTION EVERY 12 HOURS SCHEDULED
Status: DISCONTINUED | OUTPATIENT
Start: 2023-02-14 | End: 2023-02-15 | Stop reason: HOSPADM

## 2023-02-14 RX ORDER — ATORVASTATIN CALCIUM 80 MG/1
80 TABLET, FILM COATED ORAL NIGHTLY
Status: DISCONTINUED | OUTPATIENT
Start: 2023-02-14 | End: 2023-02-15 | Stop reason: HOSPADM

## 2023-02-14 RX ADMIN — ASPIRIN 300 MG: 300 SUPPOSITORY RECTAL at 03:23

## 2023-02-14 RX ADMIN — DEXTROSE MONOHYDRATE 125 ML: 100 INJECTION, SOLUTION INTRAVENOUS at 18:54

## 2023-02-14 RX ADMIN — SODIUM BICARBONATE: 84 INJECTION, SOLUTION INTRAVENOUS at 07:22

## 2023-02-14 RX ADMIN — Medication 1500 ML/HR: at 18:50

## 2023-02-14 RX ADMIN — SODIUM CHLORIDE 5.07 UNITS/HR: 9 INJECTION, SOLUTION INTRAVENOUS at 11:15

## 2023-02-14 RX ADMIN — SODIUM CHLORIDE: 9 INJECTION, SOLUTION INTRAVENOUS at 05:52

## 2023-02-14 RX ADMIN — FUROSEMIDE 20 MG: 10 INJECTION, SOLUTION INTRAMUSCULAR; INTRAVENOUS at 18:15

## 2023-02-14 RX ADMIN — ETOMIDATE 10 MG: 2 INJECTION INTRAVENOUS at 01:26

## 2023-02-14 RX ADMIN — SODIUM BICARBONATE 25 MEQ: 84 INJECTION INTRAVENOUS at 13:30

## 2023-02-14 RX ADMIN — NOREPINEPHRINE BITARTRATE 100 MCG/MIN: 1 SOLUTION INTRAVENOUS at 19:26

## 2023-02-14 RX ADMIN — MIDAZOLAM HYDROCHLORIDE 2 MG: 2 INJECTION, SOLUTION INTRAMUSCULAR; INTRAVENOUS at 02:00

## 2023-02-14 RX ADMIN — SODIUM BICARBONATE: 84 INJECTION, SOLUTION INTRAVENOUS at 17:25

## 2023-02-14 RX ADMIN — Medication 10 MCG/MIN: at 03:01

## 2023-02-14 RX ADMIN — AMIODARONE HYDROCHLORIDE 300 MG: 50 INJECTION, SOLUTION INTRAVENOUS at 01:15

## 2023-02-14 RX ADMIN — ATORVASTATIN CALCIUM 80 MG: 80 TABLET, FILM COATED ORAL at 19:39

## 2023-02-14 RX ADMIN — VASOPRESSIN 0.03 UNITS/MIN: 20 INJECTION PARENTERAL at 13:54

## 2023-02-14 RX ADMIN — Medication 25 MEQ: at 13:30

## 2023-02-14 RX ADMIN — FUROSEMIDE 20 MG: 10 INJECTION, SOLUTION INTRAMUSCULAR; INTRAVENOUS at 11:19

## 2023-02-14 RX ADMIN — HEPARIN SODIUM 4000 UNITS: 1000 INJECTION INTRAVENOUS; SUBCUTANEOUS at 03:27

## 2023-02-14 RX ADMIN — EPINEPHRINE 1 MG: 0.1 INJECTION INTRACARDIAC; INTRAVENOUS at 01:10

## 2023-02-14 RX ADMIN — MAGNESIUM SULFATE HEPTAHYDRATE 2000 MG: 40 INJECTION, SOLUTION INTRAVENOUS at 16:55

## 2023-02-14 RX ADMIN — TICAGRELOR 90 MG: 90 TABLET ORAL at 12:00

## 2023-02-14 RX ADMIN — NOREPINEPHRINE BITARTRATE 100 MCG/MIN: 1 SOLUTION INTRAVENOUS at 13:52

## 2023-02-14 RX ADMIN — HEPARIN SODIUM 12 UNITS/KG/HR: 10000 INJECTION, SOLUTION INTRAVENOUS at 06:29

## 2023-02-14 RX ADMIN — POTASSIUM CHLORIDE 20 MEQ: 29.8 INJECTION, SOLUTION INTRAVENOUS at 15:35

## 2023-02-14 RX ADMIN — Medication 2 MG/HR: at 05:51

## 2023-02-14 RX ADMIN — Medication 25 MCG/HR: at 08:11

## 2023-02-14 RX ADMIN — Medication 5 MG/HR: at 14:10

## 2023-02-14 RX ADMIN — AMIODARONE HYDROCHLORIDE 1 MG/MIN: 50 INJECTION, SOLUTION INTRAVENOUS at 06:01

## 2023-02-14 RX ADMIN — AMIODARONE HYDROCHLORIDE 1 MG/MIN: 50 INJECTION, SOLUTION INTRAVENOUS at 08:46

## 2023-02-14 RX ADMIN — Medication 1 MG/MIN: at 01:27

## 2023-02-14 RX ADMIN — Medication 300 MCG/MIN: at 16:37

## 2023-02-14 RX ADMIN — PHENYLEPHRINE HYDROCHLORIDE 300 MCG/MIN: 10 INJECTION INTRAVENOUS at 19:46

## 2023-02-14 RX ADMIN — Medication 1500 ML/HR: at 18:52

## 2023-02-14 RX ADMIN — Medication 50 MCG/HR: at 14:08

## 2023-02-14 RX ADMIN — PROPOFOL 15 MCG/KG/MIN: 10 INJECTION, EMULSION INTRAVENOUS at 03:16

## 2023-02-14 RX ADMIN — Medication 1500 ML/HR: at 18:51

## 2023-02-14 RX ADMIN — Medication 25 MCG/MIN: at 12:14

## 2023-02-14 RX ADMIN — MIDAZOLAM HYDROCHLORIDE 2 MG: 1 INJECTION, SOLUTION INTRAMUSCULAR; INTRAVENOUS at 02:00

## 2023-02-14 RX ADMIN — SODIUM CHLORIDE, PRESERVATIVE FREE 20 MG: 5 INJECTION INTRAVENOUS at 15:44

## 2023-02-14 RX ADMIN — AMIODARONE HYDROCHLORIDE 1 MG/MIN: 50 INJECTION, SOLUTION INTRAVENOUS at 03:01

## 2023-02-14 RX ADMIN — SODIUM CHLORIDE, PRESERVATIVE FREE 10 ML: 5 INJECTION INTRAVENOUS at 19:39

## 2023-02-14 RX ADMIN — AMIODARONE HYDROCHLORIDE 0.5 MG/MIN: 50 INJECTION, SOLUTION INTRAVENOUS at 13:57

## 2023-02-14 RX ADMIN — EPINEPHRINE 1 MG: 0.1 INJECTION INTRACARDIAC; INTRAVENOUS at 01:29

## 2023-02-14 RX ADMIN — POTASSIUM CHLORIDE 20 MEQ: 29.8 INJECTION, SOLUTION INTRAVENOUS at 16:38

## 2023-02-14 RX ADMIN — DEXTROSE MONOHYDRATE 125 ML: 100 INJECTION, SOLUTION INTRAVENOUS at 21:22

## 2023-02-14 RX ADMIN — SODIUM CHLORIDE, PRESERVATIVE FREE 10 ML: 5 INJECTION INTRAVENOUS at 10:45

## 2023-02-14 RX ADMIN — EPINEPHRINE 10 MCG/MIN: 1 INJECTION PARENTERAL at 18:52

## 2023-02-14 RX ADMIN — EPINEPHRINE 29 MCG/MIN: 1 INJECTION PARENTERAL at 21:59

## 2023-02-14 RX ADMIN — SUCCINYLCHOLINE CHLORIDE 100 MG: 20 INJECTION, SOLUTION INTRAMUSCULAR; INTRAVENOUS at 01:25

## 2023-02-14 RX ADMIN — NOREPINEPHRINE BITARTRATE 95 MCG/MIN: 1 SOLUTION INTRAVENOUS at 11:17

## 2023-02-14 RX ADMIN — SODIUM BICARBONATE: 84 INJECTION, SOLUTION INTRAVENOUS at 13:49

## 2023-02-14 RX ADMIN — SODIUM CHLORIDE, PRESERVATIVE FREE 20 MG: 5 INJECTION INTRAVENOUS at 19:40

## 2023-02-14 RX ADMIN — Medication 300 MCG/MIN: at 13:50

## 2023-02-14 RX ADMIN — CALCIUM GLUCONATE 2000 MG: 20 INJECTION, SOLUTION INTRAVENOUS at 22:36

## 2023-02-14 RX ADMIN — TICAGRELOR 90 MG: 90 TABLET ORAL at 19:40

## 2023-02-14 RX ADMIN — EPINEPHRINE 1 MCG/KG/MIN: 1 INJECTION PARENTERAL at 01:35

## 2023-02-14 RX ADMIN — ETOMIDATE 40 MG: 2 INJECTION INTRAVENOUS at 01:25

## 2023-02-14 RX ADMIN — HEPARIN SODIUM 8 UNITS/KG/HR: 10000 INJECTION, SOLUTION INTRAVENOUS at 14:06

## 2023-02-14 RX ADMIN — VASOPRESSIN 0.01 UNITS/MIN: 20 INJECTION PARENTERAL at 10:45

## 2023-02-14 RX ADMIN — SODIUM CHLORIDE 1500 MG: 900 INJECTION INTRAVENOUS at 17:56

## 2023-02-14 ASSESSMENT — PULMONARY FUNCTION TESTS
PIF_VALUE: 17
PIF_VALUE: 22
PIF_VALUE: 17

## 2023-02-14 NOTE — PLAN OF CARE
Problem: Respiratory - Adult  Goal: Achieves optimal ventilation and oxygenation  Flowsheets (Taken 2/14/2023 1527)  Achieves optimal ventilation and oxygenation:   Assess for changes in respiratory status   Assess for changes in mentation and behavior   Position to facilitate oxygenation and minimize respiratory effort   Oxygen supplementation based on oxygen saturation or arterial blood gases   Assess the need for suctioning and aspirate as needed   Assess and instruct to report shortness of breath or any respiratory difficulty   Respiratory therapy support as indicated

## 2023-02-14 NOTE — CONSULTS
PULMONARY & CRITICAL CARE MEDICINE CONSULT NOTE     Patient:  Jay Daniel  MRN: [id number]  Admit date: 2/14/2023  Primary Care Physician: KENDALL Duckworth - GOYO  Consulting Physician: Nimesh Dalton MD  CODE Status: Full Code  LOS: 0     SUBJECTIVE     CHIEF COMPLAINT/REASON FOR INITIAL CONSULT: Mechanical ventilation     BRIEF HOSPITAL COURSE:   64-year-old female with past medical history of hyperlipidemia, depression and mild intermittent asthma initially presented to [facility] emergency department with chest pain. Patient apparently went into pulseless with a cardiac arrest and was given 2 mg of epinephrine, 300 mg of amiodarone and defibrillated x2 with 200 J. And then again coded during intubation but ROSC was achieved. EKG done at [facility] showed anterolateral STEMI and patient was transferred to [facility] for urgent PCI. Patient arrived to the hospital intubated, hypotensive and hypoxic and was started on epinephrine and amnio drip. Vital signs on arrival:  VS: HR 81 BP 68/41 SpO2 86% RR 31 Afebrile  Patient was emergently taken to cardiac cath and underwent manual thrombectomy and PTCA/CORNELL of mid LAD. Patient is currently intubated, on pressor support with norepinephrine 90 sedated with Versed and on heparin drip with following vent settings:  Mode PRVC    RR 18    tidal volume 340     PEEP 12    FiO2 100%  ABGs:  pH 7.09    PCO2 57.9      PO2 64.4      HCO3 16.4  Patient was started on bicarb drip for mixed respiratory and metabolic acidosis  Patient was also found to have high blood sugars and started on insulin infusion. .  Pulmonology consulted for mechanical ventilation.       REVIEW OF SYSTEMS:  Unobtainable from patient due to sedation/mechanical ventilation    OBJECTIVE     VENTILATOR SETTINGS:  Vent Information  Ventilator ID: [id number]  Ventilator Initiate: Yes     PaO2/FiO2 RATIO:  Recent Labs     02/14/23  0626   POCPO2 64.4*      FiO2 : 100 %     VITAL SIGNS:   LAST:  BP (!) 135/117   Pulse (!) 117   Temp (!) 95.4 °F (35.2 °C)   Resp (!) 42   LMP 2008   SpO2 96%   8-24 HR RANGE:  TEMP Temp  Av.8 °F (34.9 °C)  Min: 92.3 °F (33.5 °C)  Max: 96.8 °F (36 °C)   BP Systolic (76RNQ), BKN:270 , Min:75 , EJQ:368      Diastolic (63RAZ), PWO:21, Min:18, Max:140     PULSE Pulse  Av.6  Min: 64  Max: 117   RR Resp  Av.4  Min: 16  Max: 42   O2 SAT SpO2  Av.1 %  Min: 26 %  Max: 100 %   OXYGEN DELIVERY No data recorded        SYSTEMIC EXAMINATION:   General appearance - Mechanically ventilated, ill-appearing  Mental status - sedated  Neck - supple, no significant adenopathy, carotids upstroke normal bilaterally, no bruits  Chest - Breath sounds bilaterally were dimnished to auscultation at bases. There were no wheezes, rhonchi or rales.   There is no intercostal recession or use of accessory muscles  Heart - normal rate, regular rhythm, normal S1, S2, no murmurs, rubs, clicks or gallops  Abdomen - soft, nontender, nondistended, no masses or organomegaly  Neurological - DTR's normal and symmetric, motor and sensory grossly normal bilaterally  Extremities - peripheral pulses normal, no pedal edema, no clubbing or cyanosis  Skin - normal coloration and turgor, no rashes, no suspicious skin lesions noted     DATA REVIEW     Medications:  Scheduled Meds:   atropine        sodium chloride flush  5-40 mL IntraVENous 2 times per day    atorvastatin  80 mg Oral Nightly    [START ON 2/15/2023] aspirin  81 mg Oral Daily    vecuronium        ticagrelor  90 mg Oral BID    furosemide  20 mg IntraVENous BID     Continuous Infusions:   heparin (PORCINE) Infusion Stopped (23)    amiodarone 450mg/250ml D5W infusion 1 mg/min (23)    propofol Stopped (23)    norepinephrine 90 mcg/min (23 0751)    sodium chloride Stopped (23)    midazolam 5 mg/hr (23)    sodium bicarbonate infusion 100 mL/hr at 23 0741    vasopressin (Septic Shock) infusion      fentaNYL 50 mcg/mL 25 mcg/hr (02/14/23 0811)       INPUT/OUTPUT:  In: 225.8 [I.V.:225.8]  Out: 235 [Urine:235]  Date 02/14/23 0000 - 02/14/23 2359   Shift 7442-1292 2840-8818 3883-1498 24 Hour Total   INTAKE   I.V. 225.8   225.8   Shift Total 225.8   225.8   OUTPUT   Urine 235   235   Shift Total 235   235   Weight (kg)            LABS:  ABGs:   Recent Labs     02/14/23 0308 02/14/23 0626   POCPH 6.808* 7.059*   POCPCO2 90.3* 57.9*   POCPO2 28.1* 64.4*   POCHCO3 14.3* 16.4*   IFJT5JND 20* 81*     CBC:   Recent Labs     02/14/23 0048 02/14/23  0552 02/14/23  0632   WBC 11.1 33.2* 39.6*   HGB 13.3 10.4* 12.3   HCT 38.1 33.3* 39.6   MCV 96.9 105.0* 105.6*    249 287   LYMPHOPCT 40  --   --    RBC 3.93* 3.17* 3.75*   MCH 33.8* 32.8 32.8   MCHC 34.9* 31.2 31.1   RDW 13.2 14.1 13.7     CRP:   No results for input(s): CRP in the last 72 hours. LDH:   No results for input(s): LDH in the last 72 hours. BMP:   Recent Labs     02/14/23 0048 02/14/23 0307 02/14/23 0308 02/14/23  0626 02/14/23  0632    143  --   --  138   K 3.8 <1.5*  --   --  4.1    131*  --   --  110*   CO2 21 <6*  --   --  15*   BUN 12 4*  --   --  16   CREATININE 0.93* <0.20* 1.35* 1.15 1.12*   GLUCOSE 142* 279*  --   --  352*     Liver Function Test:   Recent Labs     02/14/23 0048 02/14/23 0307   PROT 7.1 1.5*   LABALBU 4.2 0.9*   ALT 14 133*   AST 27 156*   ALKPHOS 96 50   BILITOT 0.2* <0.1*     Coagulation Profile:   Recent Labs     02/14/23  0048 02/14/23  0632   INR 1.0  --    PROTIME 12.6  --    APTT  --  >120.0*     D-Dimer:  No results for input(s): DDIMER in the last 72 hours. Lactic Acid:  No results for input(s): LACTA in the last 72 hours. Cardiac Enzymes:  No results for input(s): CKTOTAL, CKMB, CKMBINDEX, TROPONINI in the last 72 hours.     Invalid input(s): TROPONIN, HSTROP  BNP/ProBNP:   Recent Labs     02/14/23  0307   PROBNP 44     Triglycerides:  No results for input(s): TRIG in the last 72 hours. Microbiology:  Urine Culture:  No components found for: CURINE  Blood Culture:  No components found for: CBLOOD, CFUNGUSBL  Sputum Culture:  No components found for: CSPUTUM  No results for input(s): SPECDESC, SPECIAL, CULTURE, STATUS, ORG, CDIFFTOXPCR, CAMPYLOBPCR, SALMONELLAPC, SHIGAPCR, SHIGELLAPCR, MPNEUG, MPNEUM, LACTOQL in the last 72 hours. No results for input(s): SPUTUM, SPECDESC, SPECIAL, CULTURE, STATUS, ORG, CDIFFTOXPCR, MPNEUM, MPNEUG in the last 72 hours. Invalid input(s): CURINE, CBLOOD, CFUNGUSBL     Pathology:    Radiology Reports:  XR CHEST PORTABLE   Final Result   IABP tip projects near the aortic knob, about 3.5 cm from cara. Stable diffuse patchy and interstitial opacities, nonspecific and may be seen   with pulmonary edema, infection, or ARDS. Endotracheal tube tip is 1 cm from cara, consider retracting 2-3 cm. Request for the core team to call results to the primary team was made   2/14/2023 at 7:28 am.         XR ABDOMEN FOR NG/OG/NE TUBE PLACEMENT   Final Result   Nasogastric tube tip is in the stomach         XR CHEST PORTABLE   Final Result   Extensive areas of patchy and dense opacities are noted throughout the   bilateral lungs suggestive of an extensive interstitial and airspace disease   process. CT HEAD WO CONTRAST    (Results Pending)   CT CHEST PULMONARY EMBOLISM W CONTRAST    (Results Pending)   CT ABDOMEN W CONTRAST Additional Contrast? None    (Results Pending)        Echocardiogram:   No results found for this or any previous visit.        ASSESSMENT AND PLAN     Assessment:  Acute hypoxic hypercapnic respiratory failure  Cardiogenic shock  Anterior STEMI  s/p manual thrombectomy and PTCA/CORNELL of mid LAD 07/14/2020  Acute HFrEF, EF 20% on LV gram  Mixed respiratory and metabolic acidosis  ARDS/pulmonary edema  Leukocytosis    Plan:    I personally interviewed/examined the patient; reviewed interval history, interpreted all available radiographic and laboratory data at the time of service. Patient currently sedated with Versed and Fentanyl  Patient is currently on hemodynamic support with Levophed with a goal MAP of > 65. Start the patient on Vasopressin if needed. Started patient on Insulin infusion for better control of blood sugars- we will check the beta hydroxybutyrate. Continue heparin drip as per cardiology recommendations  Patient is currently on IV Lasix 20 mg twice daily, monitor I/O with a goal of negative fluid balance  Patient is currently on bicarb infusion 100 mEq in dextrose 5% at the rate of 100 mL/h  Patient is currently on aspirin 81 mg daily, atorvastatin 80 mg daily, Brilinta 90 mg twice daily  Continue lung protective mechanical ventilation as per ARDS protocol  Monitor endotracheal secretions   Obtain X-ray chest as needed   Continue pulmonary toilet, aspiration precautions and bronchodilators  Stress ulcer prophylaxis -we will start the patient on Pepcid  Continue to monitor CBC, coagulation profile, transfuse as indicated  Chemical DVT prophylaxis-patient is on full dose heparin  Skin/wound care reviewed with the nursing staff  Physical/occupational therapy    The patient is/remains critically ill with illness/injury that acutely impairs one or more vital organ systems, such that there is a high probability of imminent or life threatening deterioration in the patient's condition. Critical care time of 35 minutes was spent (excluding procedures), in coordination of care during bedside rounds and discussion of patient care in detail, and recommendations of the team were adopted in the plan. Necessity of all invasive devices was also confirmed. Jim Lainez MD      Internal Medicine Resident, PGY-3  [facility]         2/14/2023, 8:13 AM    Please note that this chart was generated using voice recognition Dragon dictation software.   Although every effort was made to ensure the accuracy of this automated transcription, some errors in transcription may have occurred. Attending Physician Statement  I have discussed the care of Barb Moreira David, including pertinent history and exam findings with the resident. I have reviewed the key elements of all parts of the encounter with the resident. I have seen and examined the patient with the resident. I agree with the assessment and plan and status of the problem list as documented. I seen the patient this morning, I have reviewed the chart, multiple lab blood gases and imaging studies seen. I have reviewed the results of the cardiac catheterization cardiology note seen. Patient was a cardiac arrest/V-fib arrest presented to [facility] with chest pain found to have anterolateral ST elevation patient was hypoxic hypotensive resuscitated and ROSC was achieved then she had another arrest she was intubated on ventilator and taken to Cath Lab. [facility].  She had PCI/manual thrombectomy done by cardiology LV function was 20%. Patient had intra-aortic balloon pump placed in Cath Lab and she had acute metabolic and respiratory acidosis on ventilator requiring 100% FiO2 and high PEEP chest x-ray consistent with pulm edema, she is on Levophed drip and vasopressin with cardiogenic shock. Elevated lactic acidosis. Urine output is low. When I saw her at the time she was on 2 pressors she was 100% FiO2 PEEP of 12. Initial blood gases was due in the morning was 7.07 with bicarb of 11 and PCO2 of 40 repeated blood gas this morning was 7.0 6/58/64/16. When I was there a repeat blood gas was done pH of 7.0 7, 57 PCO2, PO2 was 70 and bicarb was 17. When I saw the patient patient was on Versed drip just starting a fentanyl drip. Patient was not responsive to vocal commands has a spontaneous respiration I did not see spontaneous movement. ST elevation MI. Cardiac arrest/V-fib arrest  Status post PCI/manual thrombectomy LAD.   Severe LV dysfunction with EF of 20%. Cardiogenic shock. Status post IABP. Lactic acidosis secondary to cardiogenic shock and hypoperfusion. Cardiogenic pulm edema  Acute hypercapnic and hypoxic respiratory failure secondary to above. Hyperglycemia. Patient is on rate of 28 100% FiO2, PEEP of 12, tidal volume of 6 mL/kg and peak pressures are around 20. Patient was breathing over the ventilator. Increase tidal volume to 8 mL/kg. Patient has been tachypneic ventilator patient asynchrony and she is on Versed drip and started on fentanyl drip. Insulin drip ordered will start on insulin drip ICU protocol to keep blood sugar between 140 and 180. Bicarb drip is just started. She is on amiodarone drip, heparin drip Levophed and vasopressin. Low urine output with high risk of AMAURI and multiorgan failure. We will repeat lactic acid and follow-up lactic acid. We will follow-up ABG every 6 hours. Maintain intermittent bicarbonate boluses. Follow-up urine output and renal function. Follow-up neurological status and mentation as increases for hypoxic encephalopathy. Follow-up cardiology. We will start empirically Unasyn. Overall prognosis is guarded. Discussed with nursing staff, treatment and plan discussed. Discussed with respiratory therapist.    Total critical care time caring for this patient with life threatening, unstable organ failure, including direct patient contact, management of life support systems, review of data including imaging and labs, discussions with other team members and physicians at least 39  Min so far today, excluding procedures. Please note that this chart was generated using voice recognition Dragon dictation software. Although every effort was made to ensure the accuracy of this automated transcription, some errors in transcription may have occurred.      Kevin Delvalle MD  2/14/2023 12:41 PM    .[email or web address]

## 2023-02-14 NOTE — ED NOTES
Levo started at 10mcg/min  Amio at 1mg/min             Delio Brewer RN  02/14/23 1214 D[place] LINDY Cole  02/14/23 [id number]

## 2023-02-14 NOTE — PROGRESS NOTES
Presented with Dr. Rodriguez Just to family room to discuss patients prognosis with family. Discussed patients guarded prognosis in great detail. Discussed the possibility of changing her code status but family would like everything done for now. Will continue current management and keep patient FULL CODE per family wishes.     Gordon Gusman MD  [facility]  [address] [facility]

## 2023-02-14 NOTE — PROCEDURES
PROCEDURE NOTE - HEMODIALYSIS CATHETER LINE PLACEMENT    PATIENT NAME: Barb Rob RECORD NO. [id number]  DATE: 2/14/2023  SURGEON: Dr. Karlo Deluna: KENDALL Reis - GOYO    PREOPERATIVE DIAGNOSIS:  Need for dialysis access  POSTOPERATIVE DIAGNOSIS:  Same  PROCEDURE PERFORMED:  Us guided vascular access of right internal jugular vein, placement of non-tunneled central venous Hemodialysis catheter   SURGEON: Dr. Gil Spotted:  Local utilizing 1% lidocaine  ESTIMATED BLOOD LOSS:  Less than 25 ml  COMPLICATIONS:  None immediately appreciated. TIME OF PROCEDURE: 4:44 PM     DISCUSSION:  Paulette Larose is a 50y.o.-year-old female who requires hemodialysis access. The history and physical examination were reviewed and confirmed. Consent was inferred due to the urgent nature of the procedure. The patient was then prepared for the procedure. PROCEDURE:  A timeout was initiated by the bedside nurse and was confirmed by those present. The patient was placed in a supine position. The skin overlying the Right Internal Jugular Vein was prepped with chlorhexadine and draped in sterile fashion. The right internal jugular vein was evaluated under ultrasound, it was patent and compressible. The skin was infiltrated with local anesthetic. Through the anesthetized region, the introducer needle was inserted into the right internal jugular vein under ultrasound guidance, permanent ultrasound images saved, with return of dark red non pulsatile blood. A guidewire was placed through the center of the needle with no resistance. A small incision made in the skin with a #11 scalpel blade. The dilator was inserted into the skin and vein over guidewire using Seldinger technique. The dilator was then removed and the catheter was placed in the vein over the guidewire using Seldinger technique. The guidewire was then removed and all ports aspirated and flushed appropriately.  The catheter then secured using silk suture and a temporary sterile dressing was applied. No immediate complication was evident. All sponge, instrument and needle counts were correct at the completion of the procedure. Postprocedural chest x-ray showed good position of the catheter with no evidence of hemothorax or pneumothorax. The patient tolerated the procedure well with no immediate complication evident.     Electronically signed by Ritchie Valladares MD on 2/14/2023 at 10:41 PM

## 2023-02-14 NOTE — ED PROVIDER NOTES
[facility]  ED  Emergency Department Encounter  Emergency Medicine Resident     Pt Name: Shivam Rascon  MRN: [id number]  [place] 1975  Date of evaluation: 2/14/23  PCP:  Enedelia Ching, [address] [place], [place] [address]marky       Chief Complaint   Patient presents with    Cardiac Arrest       HISTORY [facility]  (Location/Symptom, Timing/Onset, Context/Setting, Quality, Duration, Modifying Factors,Severity.)      Shivam Rascon is a 50 y. o.yo female who presents with [facility] postcardiac arrest.  According to the report that was given, patient went to [facility] due to complaints of chest pain. At [place], patient coded, with initial rhythm of possibly pulseless V. tach versus V-fib. Patient did subsequently receive 2 rounds of chest compressions, 2 rounds of epinephrine and was shocked 1 time with 200 J. Patient was subsequently started on Levophed and amiodarone drip. Patient was intubated at [facility] and was brought to [facility] for cardiology to see  On further questioning, patient did not receive aspirin or heparin bolus at outlying facility. PAST MEDICAL / SURGICAL / SOCIAL / FAMILY HISTORY      has a past medical history of Collapsed lung, Depression, Headache(784.0), Hypotension, Spontaneous pneumothorax, and Vasodepressor syncope.     has a past surgical history that includes Hysterectomy; Tubal ligation; chest tube insertion; Condyloma Excision (03/17/2021); Cervix surgery (N/A, 3/17/2021); Vagina surgery (06/08/2022); and Vagina surgery (N/A, 6/8/2022).      Social History     Socioeconomic History    Marital status: Legally      Spouse name: Peggy Taylor    Number of children: 3    Years of education: 11    Highest education level: 11th grade   Occupational History    Not on file   Tobacco Use    Smoking status: Every Day     Packs/day: 1.00     Years: 15.00     Pack years: 15.00     Types: Cigarettes    Smokeless tobacco: Never   Vaping Use    Vaping Use: Never used   Substance and Sexual Activity    Alcohol use: Never    Drug use: No    Sexual activity: Yes     Partners: Male     Birth control/protection: Surgical     Comment: pt has had a hysterectomy   Other Topics Concern    Not on file   Social History Narrative    Not on file     Social Determinants of Health     Financial Resource Strain: Low Risk     Difficulty of Paying Living Expenses: Not hard at all   Food Insecurity: No Food Insecurity    Worried About [address] [employer] in the Last Year: Never true    [address] in the Last Year: Never true   Transportation Needs: Unknown    Lack of Transportation (Medical): Not on file    Lack of Transportation (Non-Medical): No   Physical Activity: Not on file   Stress: Not on file   Social Connections: Not on file   Intimate Partner Violence: Not on file   Housing Stability: Unknown    Unable to Pay for Housing in the Last Year: Not on file    Number of Places Lived in the Last Year: Not on file    Unstable Housing in the Last Year: No       Family History   Problem Relation Age of Onset    Diabetes Mother     Thyroid Disease Mother     Arthritis Mother     Cancer Father     Stroke Father     Asthma Father     Emphysema Father     Seizures Sister     Diabetes Sister     No Known Problems Maternal Grandmother     No Known Problems Maternal Grandfather     No Known Problems Paternal Grandmother     No Known Problems Paternal Grandfather     Other Other         no family h/o breast or ovarian cancer         Allergies:  Codeine    Home Medications:  Prior to Admission medications    Medication Sig Start Date End Date Taking? Authorizing Provider   QUEtiapine (SEROQUEL XR) 50 MG extended release tablet Take 1 tablet by mouth nightly 2/8/23   KENDALL Minor CNP   clonazePAM (KLONOPIN) 1 MG tablet Take 1 tablet by mouth 2 times daily as needed for Anxiety for up to 30 days.  1/22/23 2/21/23  KENDALL Minor CNP   estrogens, conjugated, (PREMARIN) 0.625 MG tablet Take 1 tablet by mouth daily 4/7/22   Sang Johnson MD   albuterol sulfate HFA (VENTOLIN HFA) 108 (90 Base) MCG/ACT inhaler Inhale 2 puffs into the lungs every 6 hours as needed for Wheezing 2/1/22   Bobby Sanchez, APRN - CNP       REVIEW OFSYSTEMS    (2-9 systems for level 4, 10 or more for level 5)      Review of Systems   Unable to perform ROS: Intubated     PHYSICAL EXAM   (up to 7 for level 4, 8 or more forlevel 5)      INITIAL VITALS:   ED Triage Vitals   BP Temp Temp Source Heart Rate Resp SpO2 Height Weight   02/14/23 0300 02/14/23 0323 02/14/23 0323 02/14/23 0300 02/14/23 0300 02/14/23 0319 -- --   (!) 75/61 (!) 94.5 °F (34.7 °C) Rectal 66 18 (!) 66 %         Physical Exam  Constitutional:       Comments: Patient intubated, sedated with propofol   HENT:      Head: Normocephalic and atraumatic. Mouth/Throat:      Comments: Presence of ET tube, tube size of 7.5  Eyes:      Pupils: Pupils are equal, round, and reactive to light. Cardiovascular:      Rate and Rhythm: Normal rate. Pulmonary:      Breath sounds: Rales present. Abdominal:      General: There is no distension. Palpations: Abdomen is soft. Tenderness: There is no abdominal tenderness. Musculoskeletal:         General: No deformity or signs of injury. Cervical back: Normal range of motion. Right lower leg: No edema. Left lower leg: No edema. Skin:     Capillary Refill: Capillary refill takes more than 3 seconds.       Comments: Patient cyanotic   Neurological:      Comments: Patient with spontaneous movement of hand, she is intubated, sedated with propofol       DIFFERENTIAL  DIAGNOSIS     PLAN (LABS / [facility] / EKG):  Orders Placed This Encounter   Procedures    COVID-19, Rapid    RAPID INFLUENZA A/B ANTIGENS    Culture, Blood 1    Culture, Urine    XR CHEST PORTABLE    CT HEAD WO CONTRAST    CT CHEST PULMONARY EMBOLISM W CONTRAST    CT ABDOMEN W CONTRAST Additional Contrast? None    CBC with Auto Differential    Comprehensive Metabolic Panel    Magnesium    Brain Natriuretic Peptide    Troponin    Troponin    Lactic Acid    Protime-INR    APTT    APTT    Blood gas, arterial    Blood Gas, Arterial    Urinalysis with Microscopic    ELECTROLYTES PLUS    Hemoglobin and hematocrit, blood    CALCIUM, IONIC (POC)    CBC    Basic Metabolic Panel    Troponin    APTT    Diet NPO    Cardiac Monitor - ED Only    Continuous Pulse Oximetry    Elevate Head of Bed    Spontaneous Awakening Trial (SAT)    Vital signs    Wound care    Tobacco cessation education    Check Cath Site and Distal Pulses    Puncture site care    Verify metformin (GLUCOPHAGE) Discontinued    Nursing communication for POCT - activated clotting time    If any sign of bleeding or hematoma at puncture site do the following:    Maintain Dry Sterile Dressing    Telemetry monitoring - 72 hour duration    Strict Bedrest    Telemetry monitoring - 72 hour duration    Full Code    Inpatient consult to Phase 1 Cardiac Rehab    Outpatient consult to Phase 2 Cardiac Rehab    Inpatient consult to Critical Care    Spontaneous Breathing Trial (SBT)    Post Extubation Oxygen Therapy    Initiate RT Adult Mechanical Ventilation Protocol    Manual Mechanical Ventilation    End Tidal CO2 Continuous    Initiate Oxygen Therapy Protocol    Pulse oximetry, continuous    Initiate Oxygen Therapy Protocol    Arterial Blood Gas, POC    Creatinine W/GFR Point of Care    POCT urea (BUN)    Lactic Acid, POC    POCT Glucose    EKG 12 Lead    EKG 12 lead    ECHO Complete 2D W Doppler W Color    Saline lock IV    ADMIT TO INPATIENT    Restraints non-violent or non-self-destructive       MEDICATIONS ORDERED:  Orders Placed This Encounter   Medications    propofol 1000 MG/100ML injection     Nicki Maddox: cabinet override    heparin (porcine) injection 4,000 Units    heparin 25,000 units in dextrose 5 % 250 mL infusion (rate based)    aspirin suppository 300 mg    DISCONTD: amiodarone (CORDARONE) 50 mg in dextrose 5 % 50 mL syringe    atropine 1 MG/10ML injection     Nicki Joshi: cabinet override    amiodarone (CORDARONE) 450 mg in dextrose 5 % 250 mL infusion    propofol injection     Order Specific Question:   Titrate Infusion? Answer:   Yes     Order Specific Question:   Initial Infusion Dose: Answer:   20 mcg/kg/min     Order Specific Question:   Goal of Therapy:     Answer:   RASS of -1 to 0     Order Specific Question:   Contact Provider if:     Answer:   New onset HR less than 50 bpm     Order Specific Question:   Contact Provider if:     Answer:   New onset SBP less than 90 mmHg     Order Specific Question:   Contact Provider if:     Answer:   Patient is receiving maximum dose and is not achieving the goal of therapy     Order Specific Question:   Contact Provider if:     Answer:   Triglycerides greater than 500 mg/dL    norepinephrine (LEVOPHED) 16 mg in sodium chloride 0.9 % 250 mL infusion     Order Specific Question:   Titrate Infusion? Answer:   Yes     Order Specific Question:   Initial Infusion Dose: Answer:   5 mcg/min     Order Specific Question:   Goal of Therapy is:      Answer:   MAP greater than 65 mmHg     Order Specific Question:   Contact Provider if:     Answer:   Patient is receiving the maximum dose and is not achieving the goal of therapy    sodium chloride flush 0.9 % injection 5-40 mL    sodium chloride flush 0.9 % injection 5-40 mL    0.9 % sodium chloride infusion    acetaminophen (TYLENOL) tablet 650 mg    atorvastatin (LIPITOR) tablet 80 mg    aspirin chewable tablet 81 mg    vecuronium (NORCURON) 10 MG injection     Kaity Curry: cabinet override    ticagrelor (BRILINTA) tablet 90 mg    furosemide (LASIX) injection 20 mg    heparin (porcine) injection 4,360 Units    heparin (porcine) injection 4,360 Units    heparin (porcine) injection 2,180 Units    heparin 25,000 units in dextrose 5 % 250 mL infusion (rate based)           Medical Decision Making  Patient who presents to [facility] in after post cardiac arrest.  On arrival, patient remains intubated however she was hypoxic with good bilateral breath sounds. Because of that we used the glide a scope to assess for tube placement, while the vent was disconnected and we started bagging patient because she was cyanotic. Glide scope was able to confirm ET tube through the vocal cords however patient had copious blood emanating from the vocal cords which we were able to suction. Her chest x-ray did show what was concerning for bilateral pulmonary infiltrates concerning for acute respiratory distress syndrome. And thus ARDS protocol was initiated with vent settings    Stat EKG obtained also on arrival which did show ST segment elevation from V2 3 V6. Cardiology immediately consulted. Patient did get rectal aspirin 300mg in addition to heparin bolus of 4000 units  Her orders of Levophed, amiodarone, propofol were reordered. Timeline  3:05AM - EKG sent to interventional cardiologist  3:06AM - STEMI alert paged  3:06AM - Dr. Zack Ann, interventional cardiologist return call  3:07AM - Cath lab activated    Given that Cath Lab was activated, patient initial CT scans that was ordered was not done. Amount and/or Complexity of Data Reviewed  Labs: ordered. Radiology: ordered. ECG/medicine tests: ordered. Risk  Prescription drug management. Decision regarding hospitalization.     Critical Care  Total time providing critical care: 30-74 minutes     DIAGNOSTIC RESULTS / EMERGENCYDEPARTMENT COURSE / MDM     LABS:  Labs Reviewed   TROPONIN - Abnormal; Notable for the following components:       Result Value    Troponin, High Sensitivity 53 (*)     All other components within normal limits   ELECTROLYTES PLUS - Abnormal; Notable for the following components:    POC Potassium 4.6 (*)     POC Chloride 113 (*)     POC TCO2 15 (*)     All other components within normal limits   HGB/HCT - Abnormal; Notable for the following components:    POC Hemoglobin 11.3 (*)     POC Hematocrit 33 (*)     All other components within normal limits   ARTERIAL BLOOD GAS, POC - Abnormal; Notable for the following components:    POC pH 6.808 (*)     POC pCO2 90.3 (*)     POC PO2 28.1 (*)     POC HCO3 14.3 (*)     Negative Base Excess, Art 21 (*)     POC O2 SAT 20 (*)     All other components within normal limits   CREATININE W/GFR POINT OF CARE - Abnormal; Notable for the following components:    POC Creatinine 1.35 (*)     All other components within normal limits   LACTIC ACID,POINT OF CARE - Abnormal; Notable for the following components:    POC Lactic Acid 12.01 (*)     All other components within normal limits   POCT GLUCOSE - Abnormal; Notable for the following components:    POC Glucose 463 (*)     All other components within normal limits   COVID-19, RAPID   RAPID INFLUENZA A/B ANTIGENS   CULTURE, BLOOD 1   CULTURE, URINE   BRAIN NATRIURETIC PEPTIDE   LACTIC ACID   CALCIUM, IONIC (POC)   CBC WITH AUTO DIFFERENTIAL   COMPREHENSIVE METABOLIC PANEL   MAGNESIUM   TROPONIN   PROTIME-INR   APTT   APTT   APTT   APTT   BLOOD GAS, ARTERIAL   URINALYSIS WITH MICROSCOPIC   TROPONIN   APTT   APTT   APTT   UREA N (POC)         RADIOLOGY:  XR CHEST PORTABLE    Result Date: 2/14/2023  EXAMINATION: ONE XRAY VIEW OF THE CHEST 2/14/2023 3:10 am COMPARISON: None. HISTORY: ORDERING SYSTEM PROVIDED HISTORY: Shortness of Breath TECHNOLOGIST PROVIDED HISTORY: Shortness of Breath FINDINGS: An endotracheal tube is noted with the distal tip 3 cm above the level of the cara. An enteric tube is present with the distal tip below the level of the GE junction. Extensive areas of patchy and dense opacities are noted throughout the bilateral lungs suggestive of an extensive interstitial and airspace disease process.      Extensive areas of patchy and dense opacities are noted throughout the bilateral lungs suggestive of an extensive interstitial and airspace disease process. XR CHEST PORTABLE    Result Date: 2/14/2023  EXAMINATION: ONE XRAY VIEW OF THE CHEST 2/14/2023 1:52 am COMPARISON: None. HISTORY: ORDERING SYSTEM PROVIDED HISTORY: Code, ET placement TECHNOLOGIST PROVIDED HISTORY: Code, ET placement FINDINGS: And is shaky ule tube is noted with the distal tip 1 cm above the level of the cara. An enteric tube is present with distal tip below the level of the GE junction. Extensive patchy opacities are noted throughout the bilateral lungs and is worse on the right side suggestive of an interstitial pulmonary process. Extensive patchy opacities are noted throughout the bilateral lungs and is worse on the right side suggestive of an interstitial pulmonary process. Support tubes and catheters as noted above. EKG  EKG Interpretation    Interpreted by me    Rhythm: normal sinus   Rate: normal  Axis: normal  Ectopy: none  Conduction: normal  ST Segments: ST-segment elevation from leads V2 through V6 which demonstrated acute STEMI  T Waves: no acute change  Q Waves: none    Clinical Impression: ST-segment elevation from leads V2 through V6 which demonstrated acute STEMI    All EKG's are interpreted by the Emergency Department Physicianwho either signs or Co-signs this chart in the absence of a cardiologist.    EMERGENCY DEPARTMENT COURSE:          PROCEDURES:  None    CONSULTS:  IP CONSULT TO CRITICAL CARE  IP CONSULT TO CARDIAC REHAB  IP CONSULT TO CARDIAC REHAB    CRITICAL CARE:      FINAL IMPRESSION      1. Cardiac arrest ([address]    2. Hypothermic shock, initial encounter    3. Other specified hypotension    4. Metabolic acidosis    5. Respiratory acidosis    6. ARDS (adult respiratory distress syndrome) (HCC)    7. ST elevation myocardial infarction (STEMI), unspecified artery ([address]          DISPOSITION / PLAN     DISPOSITION Admitted 02/14/2023 04:17:23 AM      PATIENT REFERRED TO:  No follow-up provider specified.     DISCHARGE MEDICATIONS:  New Prescriptions    No medications on file       Celestino Maharaj MD  Emergency Medicine Resident    (Please note that portions of this note were completed with a voice recognition program.Efforts were made to edit the dictations but occasionally words are mis-transcribed.)        Celestino Maharaj MD  Resident  02/14/23 9915

## 2023-02-14 NOTE — FLOWSHEET NOTE
02/14/23 1230   Treatment Team Notification   Reason for Communication Abnormal vitals; Evaluate; Review case   Team Member Name Dr. Robb April Team Role Attending Provider   Method of Communication Face to face   Response At bedside   Notification Time Darcie Monroy, RN

## 2023-02-14 NOTE — ED NOTES
Pt to ED as transfer from [place] s/p cardiac arrest. Pt initially to [place] ED c/o chest pain. Per report, pt was found to be in v-fib or v-tach, report unclear. Pt defibrillated at [address] [facility]. 2 rounds of epi given. ROSC obtained PTA. Pt started on epi drip and amio drip. Intubated PTA. Pt did not receive aspirin or heparin PTA. Piedad Romano, Dr. Cristin Terry, Dr. Yolanda Spurling and MALU burton at bedside.       Raquel Guevara, RN  02/14/23 5045

## 2023-02-14 NOTE — PROGRESS NOTES
[facility] [address]  Flight Physician       Pt Name: Susana Mills   MRN: [id number]   [place] 1975   Date of evaluation: 02/14/23    REASON FOR FLIGHT      Patient was transported from [place] [address] ED to [facility] ED due to need for higher level of care s/p cardiac arrest from possible myocardial infarct. HISTORY OF PRESENT ILLNESS     Barb Clarke  is a 50 y.o. presented to [place] [address] emergency department this evening due to chest pain. On her arrival at [place] [address] ED, patient was reportedly alert and oriented x4, conversing appropriately and was complaining of chest pain. Shortly after arrival at [place] [address] ED, patient went into pulseless V. tach cardiac arrest, was given a total of 2 mg of epinephrine, 300 mg of amiodarone with defibrillation x2 with 200 J. Patient subsequently coded again during intubation, however did achieved ROSC. On [facility] arrival, patient was intubated, receiving BVM by RT through ETT. Patient was placed on an epinephrine drip and amnio drip prior to [address] [place] [address] arrival.  Hypotensive, hypoxic. Intermittently moving all 4 extremities. Decision made for push dose Versed for sedation en route. Repeat EKG at the time showing a new right bundle branch block with concordance. [facility] [place] [facility]. Patient transported to [facility]. Of note, patient was seen at [place] multiple days ago for chest pain, was discharged for outpatient cath.     Access: b/l 20G AC    [facility] Arrival Time: 0147    VS: HR 81 BP 68/41 SpO2 86% RR 31 Afebrile    Physical Exam:  Gen: Intubated, intermittent movement, off sedation  Neuro: Intermittent movement of all 4 extremities  CV: HRRR  Pulm: Bilateral rhonchi   Abd: Soft and non tender, dark bloody NGT output  MSK: Grossly intact    [facility] Departure Time: 0225    En Route:  Due to persistently poor blood pressure on route, epinephrine drip was switched to Levophed as first-line vasopressor. Patient given a total of 6 mg of Versed for sedation. Fluids running. MDM:  Patient arrived [facility] emergency department in critical condition with no gross deterioration on route.     PRE HOSPITAL MEDICATIONS     Medications at sending facility: Amiodarone gtt, epinephrine gtt, fluids    Medications in flight: Levophed gtt, push dose Versed    PROCEDURES/Ultrasound:  None    STAR Vides MD  [facility] Physician     (Please note that portions of this note were completed with a voice recognition program.  Efforts were made to edit the dictations but occasionally words are mis-transcribed.)

## 2023-02-14 NOTE — PROGRESS NOTES
Writer informed Dr. Madison Hensley with cardiology that Qtc is 560. Orders to switch amiodarone drip to 0.5 mg/min.  Drips adjusted accordingly

## 2023-02-14 NOTE — FLOWSHEET NOTE
02/14/23 1200   Treatment Team Notification   Reason for Communication Review case  (Family still has spoken to a physician)   Team Member Name Dr. Shivani Araiza Team Role Resident   Method of Communication Secure Message   Response En route   Notification Time Kristy Sterling [phone], RN

## 2023-02-14 NOTE — OP NOTE
[facility]    CARDIAC CATHETERIZATION    Date:   2/14/2023  Patient name:  Earlene Colbert  Date of admission:  2/14/2023  2:59 AM  MRN:   [id number]  YOB: 1975    Operators:  Primary:   VENTURA Cherry MD (Attending Physician)      Procedure performed:     [x] Left Heart Catheterization. [] Graft Angiography. [x] Left Ventriculography. [] Right Heart Catheterization. [x] Coronary Angiography. [] Aortic Valve Studies. [x] PCI:      [] Other:       Pre Procedure Conscious Sedation Data:  ASA Class:    [] I [] II [] III [x] IV    Mallampati Class:  [] I [x] II [] III [] IV      Indication:    - Cardiac Arrest Ventricular Fibrillation       - STEMI patient      Procedure:  Procedure Type:      Diagnostic procedure: Lt Heart, Coronary Angio, LVgram, IABP Insertion ,   Ultrasound used for access - Femoral     Complications:    - No complication    Estimated Blood Loss:  [x] Minimal < 25 cc [] Moderate 25-50 cc  [] >50 cc    Findings:  Angiographic Findings      Cardiac Arteries and Lesion Findings     LMCA: Normal 0% stenosis. LAD: mid 70% followed by 95% stenosis with thrombus. The D1 is small. Lesion on Mid LAD: Proximal subsection. 95% stenosis 28 mm length reduced    to 0%. Pre procedure CASSANDRA III flow was noted. Post Procedure CASSANDRA III    flow was present. Good runoff was present. The lesion was diagnosed as    High Risk (C). The lesion was discrete. The lesion showed evidence of    thrombus presence, with irregular contour, mild angulation and mild    tortuosity. Lesion plaque is ruptured. Devices used       - CouchOne Prowater Flex 180 cm. Number of passes: 1.       - Pronto Extraction Catheter. Number of passes: 1.       - Euphora Balloon 2.0mm x 12mm. 1 inflation(s) to a max pressure of: 12    sri. - Resolute Bricelyn 2.5 x 30 CORNELL. 1 inflation(s) to a max pressure of: 20    sri. - NC Euphora Balloon 3.0mm x 20mm.  2 inflation(s) to a max pressure of:    20 sri.     LCx: Normal 0% stenosis. The OM1 is normal.     RCA: Normal 0% stenosis. Coronary Tree      Dominance: Right     LV Analysis  LV function assessed as:Abnormal.  Ejection Fraction  +----------------------------------------------------------------------+---+  ! Method                                                                ! EF%! +----------------------------------------------------------------------+---+  ! LV gram                                                               !20 !  +----------------------------------------------------------------------+---+      Conclusions:    Neisha-infarct Ventricular fibrillation. Anterior STEMI. Acute hypoxic respiratory failure   Acute pulmonary edema   Cardiogenic shock. One vessel coronary artery disease. Severe LV systolic dysfunction; LVEF 20%. Manual thrombectomy of mid LAD. Successful PTCA/CORNELL of mid LAD. Successful placement of IABP. Recommendations:   Medical therapy as needed. Risk factor modification. Routine Post PCI Orders. TTE. Life vest prior to discharge.      ____________________________________________________________________    History and Risk Factors    [] Hypertension     [] Family history of CAD  [] Hyperlipidemia     [] Cerebrovascular Disease   [] Prior MI       [] Peripheral Vascular disease   [] Prior PCI              [] Diabetes Mellitus    [] Left Main PCI. [] Currently on Dialysis. [] Prior CABG. [] Currently smoker. [] Cardiac Arrest outside of healthcare facility. [x] Yes    [] No        Witnessed     [x] Yes   [] No     Arrest after arrival of EMS  [] Yes   [] No     [] Cardiac Arrest at other Facility. [] Yes   [x] No    Pre-Procedure Information. Heart Failure       [] Yes    [x] No        Class  [] I      [] II  [] III    [] IV. New Diagnosis    [] Yes  [] No    HF Type      [] Systolic   [] Diastolic          [] Unknown.     Diagnostic Test:   EKG       [] Normal   [x] Abnormal New antiarrhythmia medications:    [] Yes   [] No   New onset atrial fibrillation / Flutter     [] Yes   [] No   ECG Abnormalities:      [x] V. Fib   [] Tatiana V. Tach           [] NS V. T   [] New LBBB           [] T. Inv  [x]  ST dev > 0.5 mm         [] PVC's freq  [] PVC's infrequent    Stress Test Performed:      [] Yes    [x] No     Type:     [] Stress Echo   [] Exercise Stress Test (no imaging)      [] Stress Nuclear  [] Stress Imaging     Results   [] Negative   [] Positive        [] Indeterminate  [] Unavailable     If Positive/ Risk / Extent of Ischemia:       [] Low  [] Intermediate         [] High  [] Unavailable      Cardiac CTA Performed:     [] Yes    [x] No      Results   [] CAD   [] Non obstructive CAD      [] No CAD   [] Uncertain      [] Unknown   [] Structural Disease. Pre Procedure Medications:   [x] Yes    [] No         [x] ASA   [] Beta Blockers      [] Nitrate   [] Ca Channel Blockers      [] Ranolazine   [] Statin       [] Plavix/Others antiplatelets      Electronically signed on 2/14/2023 at 3:48 AM by:    Alicia Lloyd MD  Fellow, [address] I was present during entire procedure and performed all critical elements of the procedure.     Erasmo Velasquez MD

## 2023-02-14 NOTE — FLOWSHEET NOTE
02/14/23 1215   Treatment Team Notification   Reason for Communication Review case  (spoke with family)   Team Member Name Dr. Harris Henning   Treatment Team Role Resident   Method of Communication Face to face  (obtained consent for CVC)   Response In department   Notification Time Purvi Rivear, RN

## 2023-02-14 NOTE — H&P
[facility]   History & Physical               Today's Date: 2/14/2023  Patient Name: Tori Bailey  Date of admission: 2/14/2023  2:59 AM  Patient's age: 50 y.o., 1975  Admission Dx: No admission diagnoses are documented for this encounter. Reason for Admission:  VF arrest, Ceasar-lateral STEMI, respiratory failure    CHIEF COMPLAINT:    Chief Complaint   Patient presents with    Cardiac Arrest       History Obtained From:  electronic medical record    HISTORY OF PRESENT ILLNESS:      The patient is a 50 y.o. presented as a transfer from [facility] for VF arrest and anterolateral STEMI. Patient was admitted to [facility] ED with left sided chest radiating to left arm associated with SOB. Patient had vfib arrest shortly after, patient received CPR, she received epi x2, shocked 200j x 2, and received 300 of amiodarone with ROSC. Patient was later intubated, placed on epinephrine infusion and amiodarone infusion. Patient reportedly received traumatic intubation, she was hypotensive and hypoxic. EKG done at [facility] showed Anterolateral STEMI, interventional cardiologist on call accepted transfer for STEMI to [facility]. On admission  ABG pH 6.8, pCO2 90.3, pO2 28, Bicarb 13, Lactic Acid 12  POC glucose 463  Hemoglobin 13.3  WBC 11.1  Troponin 20 >> 53    Past Medical History:   has a past medical history of Collapsed lung, Depression, Headache(784.0), Hypotension, Spontaneous pneumothorax, and Vasodepressor syncope. Past Surgical History:   has a past surgical history that includes Hysterectomy; Tubal ligation; chest tube insertion; Condyloma Excision (03/17/2021); Cervix surgery (N/A, 3/17/2021); Vagina surgery (06/08/2022); and Vagina surgery (N/A, 6/8/2022). Home Medications:    Prior to Admission medications    Medication Sig Start Date End Date Taking?  Authorizing Provider   QUEtiapine (SEROQUEL XR) 50 MG extended release tablet Take 1 tablet by mouth nightly 2/8/23   Igor Ramirez Might, APRN - CNP   clonazePAM (KLONOPIN) 1 MG tablet Take 1 tablet by mouth 2 times daily as needed for Anxiety for up to 30 days. 1/22/23 2/21/23  KENDALL Connolly CNP   estrogens, conjugated, (PREMARIN) 0.625 MG tablet Take 1 tablet by mouth daily 4/7/22   Orlando Padilla MD   albuterol sulfate HFA (VENTOLIN HFA) 108 (90 Base) MCG/ACT inhaler Inhale 2 puffs into the lungs every 6 hours as needed for Wheezing 2/1/22   KENDALL Connolly CNP        Current Facility-Administered Medications: propofol 1000 MG/100ML injection, , ,   heparin 25,000 units in dextrose 5 % 250 mL infusion (rate based), 5-30 Units/kg/hr, IntraVENous, Continuous  atropine 1 MG/10ML injection, , ,   amiodarone (CORDARONE) 450 mg in dextrose 5 % 250 mL infusion, 1 mg/min, IntraVENous, Continuous  propofol injection, 5-50 mcg/kg/min, IntraVENous, Continuous  norepinephrine (LEVOPHED) 16 mg in sodium chloride 0.9 % 250 mL infusion, 1-100 mcg/min, IntraVENous, Continuous    Allergies:  Codeine    Social History:   reports that she has been smoking cigarettes. She has a 15.00 pack-year smoking history. She has never used smokeless tobacco. She reports that she does not drink alcohol and does not use drugs. Family History: family history includes Arthritis in her mother; Asthma in her father; Cancer in her father; Diabetes in her mother and sister; Emphysema in her father; No Known Problems in her maternal grandfather, maternal grandmother, paternal grandfather, and paternal grandmother; Other in an other family member; Seizures in her sister; Stroke in her father; Thyroid Disease in her mother.     REVIEW OF SYSTEMS:      intubated        PHYSICAL EXAM:      BP (!) 75/61   Pulse 91   Temp (!) 92.3 °F (33.5 °C) (Bladder)   Resp (!) 35   LMP 04/07/2008   SpO2 (!) 69%    No intake or output data in the 24 hours ending 02/14/23 2302        Constitutional and General Appearance:   sedated  HEENT:  PERRL, EOMI  Respiratory:  intubated  Cardiovascular:  Regular rate and rhythm  S1/S2  No murmurs  The apical impulse is not displaced  Abdomen:  Soft  Bowel sounds present  Non-tender to palpation  Extremities:  No cyanosis or clubbing  Lower extremity edema: No  Skin:  Warm and dry  Neurological:  intubated      Labs:     CBC:   Recent Labs     02/14/23 0048   WBC 11.1   HGB 13.3   HCT 38.1        BMP:   Recent Labs     02/14/23 0048 02/14/23 0308     --    K 3.8  --    CO2 21  --    BUN 12  --    CREATININE 0.93* 1.35*   LABGLOM >60  --    GLUCOSE 142*  --      Pro-BNP:    Recent Labs     02/14/23 0307   PROBNP 44     BNP: No results for input(s): BNP in the last 72 hours. PT/INR:   Recent Labs     02/14/23 0048   PROTIME 12.6   INR 1.0     APTT:No results for input(s): APTT in the last 72 hours. CARDIAC ENZYMES:No results for input(s): CKTOTAL, CKMB, CKMBINDEX, TROPONINI in the last 72 hours. Invalid input(s):  TROPONINT  No results for input(s): TROPONINT in the last 72 hours. FASTING LIPID PANEL:  Lab Results   Component Value Date/Time    HDL 42 08/02/2021 11:34 AM    TRIG 140 08/02/2021 11:34 AM     LIVER PROFILE:  Recent Labs     02/14/23 0048   AST 27   ALT 14   LABALBU 4.2         Patient's Active Problem List  Active Problems:    * No active hospital problems. *  Resolved Problems:    * No resolved hospital problems. *    DATA:    Diagnostics:    EKG: Anterior lateral STEMI      IMPRESSION:    Ceasar-lateral STEMI  In hospital Vfib arrest  Acute hypoxic respiratory failure requiring intubation  Severe metabolic and respiratory acidosis (pH 6.8)    RECOMMENDATIONS:  Proceed with LHC/cors emergently  Further recommendations to follow post op      Discussed with patient and Nurse. Ruth Corea MD, M.D. Fellow, [address] [place] [address]      Please note that part of this chart were generated using voice recognition  dictation software.   Although every effort was made to ensure the accuracy of this automated transcription, some errors in transcription may have occurred. I performed a history and physical examination of the patient and discussed management with the resident. I reviewed the residents note and agree with the documented findings and plan of care. Any areas of disagreement are noted on the chart. I was personally present for the key portions of any procedures. I have documented in the chart those procedures where I was not present during the key portions. I have personally evaluated this patient and have completed at least one if not all key elements of the E/M (history, physical exam, and MDM). Additional findings are as noted.     Maikel Li MD MD

## 2023-02-14 NOTE — FLOWSHEET NOTE
02/14/23 0745   Treatment Team Notification   Reason for Communication Abnormal vitals;Critical results; Review case  (bp, ph, restraints, sedation)   Team Member Name Dr. Zurdo Carrasquillo   Treatment Team Role Consulting Provider   Method of Communication Secure Message   Response See orders   Notification Time Sushma Toro RN

## 2023-02-14 NOTE — FLOWSHEET NOTE
02/14/23 1145   Treatment Team Notification   Reason for Communication Abnormal vitals  (maxed on 2 pressors.  Still hypotensive)   Team Member Name Dr. Lamont Andersen   Treatment Team Role Resident   Method of Communication Secure Message   Response See orders   Notification Time 0388     Ebony Ragsdale, RN

## 2023-02-14 NOTE — ED PROVIDER NOTES
[facility] ED  EMERGENCY DEPARTMENT ENCOUNTER      Pt Name: Jeermiah Hooper  MRN: [id number]  [place] 1975  Date of evaluation: 2/14/2023  Provider: Arsalan He MD    CHIEF COMPLAINT       Chief Complaint   Patient presents with    Chest Pain     Pt here for midsternal cp that radiates to the neck and L arm. Reports sob. HISTORY OF PRESENT ILLNESS   (Location/Symptom, Timing/Onset, Context/Setting, Quality, Duration, Modifying Factors, Severity)  Note limiting factors. Jeremiah Hooper is a 50 y.o. female who presents to the emergency department      68-year-old female presents emergency department by EMS for evaluation of left-sided chest pain that radiated towards her left neck and arm. Was also reporting feeling short of breath. He recently been in the emergency department for chest discomfort and been discharged home with scheduled for an outpatient stress test.  Patient had sudden V-fib arrest in the ED noted on monitor. Was initiated. Nursing Notes were reviewed. REVIEW OF SYSTEMS    (2-9 systems for level 4, 10 or more for level 5)     Review of Systems    Patient's initial complaints were chest pain shortness of breath.     PAST MEDICAL HISTORY     Past Medical History:   Diagnosis Date    Collapsed lung     Depression     Headache(784.0)     Hypotension     Spontaneous pneumothorax     Vasodepressor syncope          SURGICAL HISTORY       Past Surgical History:   Procedure Laterality Date    CERVIX SURGERY N/A 3/17/2021    CERVIX ABLATION LASER-CO2 VAPORIZATION OF GENITAL CONDYLOMA performed by Princess Knapp MD at [facility]  03/17/2021    genital    HYSTERECTOMY (CERVIX STATUS UNKNOWN)      TUBAL LIGATION      VAGINA SURGERY  06/08/2022    Wide excision    VAGINA SURGERY N/A 6/8/2022    VAGINAL BIOPSY EXCISION - WIDE LOCAL performed by Princess Knapp MD at [address] [place] [address]       Previous Medications ALBUTEROL SULFATE HFA (VENTOLIN HFA) 108 (90 BASE) MCG/ACT INHALER    Inhale 2 puffs into the lungs every 6 hours as needed for Wheezing    CLONAZEPAM (KLONOPIN) 1 MG TABLET    Take 1 tablet by mouth 2 times daily as needed for Anxiety for up to 30 days.     ESTROGENS, CONJUGATED, (PREMARIN) 0.625 MG TABLET    Take 1 tablet by mouth daily    QUETIAPINE (SEROQUEL XR) 50 MG EXTENDED RELEASE TABLET    Take 1 tablet by mouth nightly       ALLERGIES     Codeine    FAMILY HISTORY       Family History   Problem Relation Age of Onset    Diabetes Mother     Thyroid Disease Mother     Arthritis Mother     Cancer Father     Stroke Father     Asthma Father     Emphysema Father     Seizures Sister     Diabetes Sister     No Known Problems Maternal Grandmother     No Known Problems Maternal Grandfather     No Known Problems Paternal Grandmother     No Known Problems Paternal Grandfather     Other Other         no family h/o breast or ovarian cancer           SOCIAL HISTORY       Social History     Socioeconomic History    Marital status: Legally      Spouse name: Mike Holm    Number of children: 3    Years of education: 11    Highest education level: 11th grade   Tobacco Use    Smoking status: Every Day     Packs/day: 1.00     Years: 15.00     Pack years: 15.00     Types: Cigarettes    Smokeless tobacco: Never   Vaping Use    Vaping Use: Never used   Substance and Sexual Activity    Alcohol use: Never    Drug use: No    Sexual activity: Yes     Partners: Male     Birth control/protection: Surgical     Comment: pt has had a hysterectomy     Social Determinants of Health     Financial Resource Strain: Low Risk     Difficulty of Paying Living Expenses: Not hard at all   Food Insecurity: No Food Insecurity    Worried About Running Out of Food in the Last Year: Never true    Ran Out of Food in the Last Year: Never true   Transportation Needs: Unknown    Lack of Transportation (Non-Medical): No   Housing Stability: Unknown Unstable Housing in the Last Year: No       SCREENINGS        [place] Coma Scale  Eye Opening: Spontaneous  Best Verbal Response: Oriented  Best Motor Response: Obeys commands  [place] Coma Scale Score: 15               PHYSICAL EXAM    (up to 7 for level 4, 8 or more for level 5)     ED Triage Vitals [02/14/23 0042]   BP Temp Temp Source Heart Rate Resp SpO2 Height Weight   114/65 96.8 °F (36 °C) Tympanic 64 24 100 % 5' 5\" (1.651 m) 160 lb (72.6 kg)       Physical Exam  Vitals and nursing note reviewed. Constitutional:       Comments: On initial arrival patient was awake and alert. Complaining of chest pain. Patient had V-fib arrest while in the ED. HENT:      Head: Normocephalic and atraumatic. Cardiovascular:      Comments: V-fib arrest  Pulmonary:      Comments: Respiratory arrest  Skin:     Comments: Cool.   No rash   Neurological:      Comments: Unresponsive       DIAGNOSTIC RESULTS     EKG: All EKG's are interpreted by the Emergency Department Physician who either signs or Co-signs this chart in the absence of a cardiologist.        RADIOLOGY:   Non-plain film images such as CT, Ultrasound and MRI are read by the radiologist. Plain radiographic images are visualized and preliminarily interpreted by the emergency physician with the below findings:        Interpretation per the Radiologist below, if available at the time of this note:    XR CHEST PORTABLE    (Results Pending)         ED BEDSIDE ULTRASOUND:   Performed by ED Physician - none    LABS:  Labs Reviewed   CBC WITH AUTO DIFFERENTIAL - Abnormal; Notable for the following components:       Result Value    RBC 3.93 (*)     MCH 33.8 (*)     MCHC 34.9 (*)     Absolute Lymph # 4.42 (*)     All other components within normal limits   TROPONIN - Abnormal; Notable for the following components:    Troponin, High Sensitivity 20 (*)     All other components within normal limits   TSH - Abnormal; Notable for the following components:    TSH 7.29 (*) All other components within normal limits   COMPREHENSIVE METABOLIC PANEL - Abnormal; Notable for the following components:    Glucose 142 (*)     Creatinine 0.93 (*)     Total Bilirubin 0.2 (*)     All other components within normal limits   PROTIME-INR   LACTIC ACID       All other labs were within normal range or not returned as of this dictation. EMERGENCY DEPARTMENT COURSE and DIFFERENTIAL DIAGNOSIS/MDM:   Vitals:    Vitals:    02/14/23 0042   BP: 114/65   Pulse: 64   Resp: 24   Temp: 96.8 °F (36 °C)   TempSrc: Tympanic   SpO2: 100%   Weight: 160 lb (72.6 kg)   Height: 5' 5\" (1.651 m)           MDM  Number of Diagnoses or Management Options  Cardiac arrest ([facility] [address]  Respiratory failure, unspecified chronicity, unspecified whether with hypoxia or hypercapnia (HCC)  ST elevation myocardial infarction (STEMI), unspecified artery [facility])  Diagnosis management comments: 73-year-old female brought to the emergency department for evaluation of left-sided chest pain and shortness of breath. V-fib arrest while in the ED. CPR was initiated. Please refer to nursing chart for timing and dosage of medications and shocks. Patient did have spontaneous return of circulation. Agonal respirations were noted. Patient was intubated. Following intubation patient did have a recurrent episode of V-fib. Amiodarone drip was started patient did require second shock. Epinephrine drip was also been initiated. [facility] ER was notified and patient was excepted for transfer. Did speak with Dr. Nimco Fuller interventional radiology. Second EKG does show STEMI anterior lateral leads. EKG image was texted to Dr. Nimco Fuller cardiology. Patient's family was updated. [facility]       REASSESSMENT          CRITICAL CARE TIME   Total Critical Care time was  minutes, excluding separately reportable procedures.   There was a high probability of clinically significant/life threatening deterioration in the patient's condition which required my urgent intervention. CONSULTS:  None    PROCEDURES:  Unless otherwise noted below, none     Intubation    Date/Time: 2/14/2023 2:20 AM  Performed by: Pat Halsted, MD  Authorized by: Pat Halsted, MD     Consent:     Consent obtained:  Emergent situation  Pre-procedure details:     Induction agents:  Etomidate    Paralytics:  Succinylcholine  Procedure details:     Preoxygenation:  Bag valve mask    CPR in progress: yes      Intubation method:  Oral    Intubation technique: direct      Laryngoscope blade: Mac 4    Bougie used: no      Tube size (mm):  7.5    Tube type:  Cuffed    Number of attempts:  1    Tube visualized through cords: yes    Placement assessment:     CXR findings:  Appropriate position        FINAL IMPRESSION      1. Cardiac arrest ([facility] [address]    2. ST elevation myocardial infarction (STEMI), unspecified artery ([facility] [address]    3. Respiratory failure, unspecified chronicity, unspecified whether with hypoxia or hypercapnia [facility])          DISPOSITION/PLAN   DISPOSITION Decision To Transfer 02/14/2023 01:36:07 AM      PATIENT REFERRED TO:  No follow-up provider specified. DISCHARGE MEDICATIONS:  New Prescriptions    No medications on file     Controlled Substances Monitoring:     RX Monitoring 1/22/2023   Periodic Controlled Substance Monitoring No signs of potential drug abuse or diversion identified.        (Please note that portions of this note were completed with a voice recognition program.  Efforts were made to edit the dictations but occasionally words are mis-transcribed.)    Pat Halsted, MD (electronically signed)  Attending Emergency Physician             Pat Halsted, MD  02/14/23 5990

## 2023-02-14 NOTE — PROGRESS NOTES
Comprehensive Nutrition Assessment    Type and Reason for Visit:  Initial (vent)    Nutrition Recommendations/Plan:   Continue NPO. Will monitor pt care plan/ need for nutrition support. If TF desired/warranted, suggest Diabetic goal 50 mL/hr. Malnutrition Assessment:  Malnutrition Status:  Insufficient data (02/14/23 6087)      Nutrition Assessment:    Pt admitted s/p cardiac arrest. Pt is currently intubated. No nutrition support at this time. Meds/labs reviewed. Current Nutrition Intake & Therapies:    Diet NPO    Additional Calorie Sources:  sodium bicarbonate 150 mEq in dextrose 5% at 150 mL/hr =612 kcal/day    Anthropometric Measures:  Height: 5' 5\" (165.1 cm)  Ideal Body Weight (IBW): 125 lbs (57 kg)    Admission Body Weight: 160 lb 0.9 oz (72.6 kg)  Current Body Weight: 160 lb 0.9 oz (72.6 kg), 128 % IBW. Weight Source: Not Specified  Current BMI (kg/m2): 26.6                          BMI Categories: Overweight (BMI 25.0-29. 9)    Estimated Daily Nutrient Needs:  Energy Requirements Based On: Kcal/kg  Weight Used for Energy Requirements: Current  Energy (kcal/day): 1800 kcal/day  Weight Used for Protein Requirements: Ideal  Protein (g/day): 85 g pro/day  Method Used for Fluid Requirements: Other (Comment)  Fluid (ml/day): per MD    Nutrition Diagnosis:   Inadequate oral intake related to impaired respiratory function as evidenced by NPO or clear liquid status due to medical condition    Nutrition Interventions:   Food and/or Nutrient Delivery: Continue NPO, Monitor pt care plan/ need for nutrition support.   Nutrition Education/Counseling: No recommendation at this time  Coordination of Nutrition Care: Continue to monitor while inpatient       Goals:     Goals: Meet at least 75% of estimated needs, within 7 days       Nutrition Monitoring and Evaluation:   Behavioral-Environmental Outcomes: None Identified  Food/Nutrient Intake Outcomes: Diet Advancement/Tolerance  Physical Signs/Symptoms Outcomes: Biochemical Data, Skin, Nutrition Focused Physical Findings, Weight, Fluid Status or Edema, Hemodynamic Status    Discharge Planning:     Too soon to determine     [address], LD  Contact: [phone]

## 2023-02-14 NOTE — PROGRESS NOTES
Insert Arterial Line  Date/Time:  02/14/23, 6:27 AM  Performed by: Mikel Griffin RCP    Patient identity confirmed: arm band and provided demographic data   Time out: Immediately prior to procedure a \"time out\" was called to verify the correct patient, procedure, equipment, support staff. Preparation: Patient was prepped and draped in the usual sterile fashion.     Location:left radial    Johnson's test normal: yes  Needle gauge: 20     Number of attempts: 1  Post-procedure: transparent dressing applied and line secured    Patient tolerance: well

## 2023-02-14 NOTE — PROGRESS NOTES
Patient admitted on Mechanical Ventilator Protocol. Patients height measured at 65 for an IBW 57 kg. Patient placed on the ventilator on settings as charted on flowsheeet. Ventilator Bronchodilator assessment    Breath sounds: clear  Inspiratory Pressure: 17  Plateau Pressure: mouna    Patient assessed at level 1          []    Bronchodilator Assessment    BRONCHODILATOR ASSESSMENT SCORE  Score 0 (Home) 1 2 3 4   Breath Sounds   []  Chronic Ventilator: Patient at baseline [x]  Mild Wheezes/ Clear []  Intermittent wheezes with good air entry []  Bilateral/unilateral wheezing with diminished air entry []  Insp/Exp wheeze and/or poor aeration   Ventilator Pressures   []  Chronic Ventilator [x]  Insp. Pressure less than 25 cm H20 []  Insp. Pressure less than 25 cm H20 []  Insp. Pressure exceeds 25 cm H20 []  Insp.  Pressure exceeds 30 cm H20   Plateau Pressure [x]  NA   []  Plateau Pressure less than 4  []  Plateau Pressure less than or equal to 5 []  Plateau Pressure greater than or equal to 6 []  Plateau Pressure greater than or equal to 8       [facility]  3:23 AM

## 2023-02-14 NOTE — PLAN OF CARE
Problem: Safety - Medical Restraint  Goal: Remains free of injury from restraints (Restraint for Interference with Medical Device)  Description: INTERVENTIONS:  1. Determine that other, less restrictive measures have been tried or would not be effective before applying the restraint  2. Evaluate the patient's condition at the time of restraint application  3. Inform patient/family regarding the reason for restraint  4.  Q2H: Monitor safety, psychosocial status, comfort, nutrition and hydration  Outcome: Progressing  Flowsheets  Taken 2/14/2023 1600 by Randa Ly RN  Remains free of injury from restraints (restraint for interference with medical device): Every 2 hours: Monitor safety, psychosocial status, comfort, nutrition and hydration  Taken 2/14/2023 1400 by Randa Ly RN  Remains free of injury from restraints (restraint for interference with medical device): Every 2 hours: Monitor safety, psychosocial status, comfort, nutrition and hydration  Taken 2/14/2023 1200 by Randa Ly RN  Remains free of injury from restraints (restraint for interference with medical device): Every 2 hours: Monitor safety, psychosocial status, comfort, nutrition and hydration  Taken 2/14/2023 1000 by Randa Ly RN  Remains free of injury from restraints (restraint for interference with medical device): Every 2 hours: Monitor safety, psychosocial status, comfort, nutrition and hydration  Taken 2/14/2023 0800 by Randa Ly RN  Remains free of injury from restraints (restraint for interference with medical device): Every 2 hours: Monitor safety, psychosocial status, comfort, nutrition and hydration  Taken 2/14/2023 0600 by Thuy Whalen RN  Remains free of injury from restraints (restraint for interference with medical device): Every 2 hours: Monitor safety, psychosocial status, comfort, nutrition and hydration  Taken 2/14/2023 0500 by Thuy Whalen RN  Remains free of injury from restraints (restraint for interference with medical device): Determine that other, less restrictive measures have been tried or would not be effective before applying the restraint     Problem: Discharge Planning  Goal: Discharge to home or other facility with appropriate resources  Outcome: Progressing     Problem: Pain  Goal: Verbalizes/displays adequate comfort level or baseline comfort level  Outcome: Progressing     Problem: Skin/Tissue Integrity  Goal: Absence of new skin breakdown  Description: 1. Monitor for areas of redness and/or skin breakdown  2. Assess vascular access sites hourly  3. Every 4-6 hours minimum:  Change oxygen saturation probe site  4. Every 4-6 hours:  If on nasal continuous positive airway pressure, respiratory therapy assess nares and determine need for appliance change or resting period.   Outcome: Progressing     Problem: Safety - Adult  Goal: Free from fall injury  Outcome: Progressing     Problem: ABCDS Injury Assessment  Goal: Absence of physical injury  Outcome: Progressing     Problem: Nutrition Deficit:  Goal: Optimize nutritional status  Outcome: Progressing     Problem: Respiratory - Adult  Goal: Achieves optimal ventilation and oxygenation  2/14/2023 1736 by Dania Ghosh RN  Outcome: Progressing  2/14/2023 1527 by Leon Edmonds RCP  Flowsheets (Taken 2/14/2023 1527)  Achieves optimal ventilation and oxygenation:   Assess for changes in respiratory status   Assess for changes in mentation and behavior   Position to facilitate oxygenation and minimize respiratory effort   Oxygen supplementation based on oxygen saturation or arterial blood gases   Assess the need for suctioning and aspirate as needed   Assess and instruct to report shortness of breath or any respiratory difficulty   Respiratory therapy support as indicated

## 2023-02-14 NOTE — PROGRESS NOTES
Call placed to legally  spouse- Clifford Hartmans. About current patient status and him being current decision maker since they are still legally  but . Per  he would like to defer POA status to their adult children (3) all over 26 y/o. Will discuss with primary team and children. Basilio Sandra RN at bedside for telephone call.

## 2023-02-14 NOTE — PROGRESS NOTES
Call received from [facility] radiology regarding AM patient xray- ETT needs retracted 2-3 cm. RCP called. RCP bedside along with writer jaswinder Skaggs. Orders to retract the tube 2cm per Dr. Letitia Skaggs. RCP retracted tube. Portable chest xray will be placed for confirmation.

## 2023-02-14 NOTE — FLOWSHEET NOTE
02/14/23 1027   Treatment Team Notification   Reason for Communication Change in status  (no pulses in RLE)   Team Member Name Dr. JHONNY Ramos   Treatment Team Role Resident   Method of Communication Secure Message   Response See orders   Notification Time 1027     Brigitte Toro RN

## 2023-02-14 NOTE — FLOWSHEET NOTE
02/14/23 0930   Treatment Team Notification   Reason for Communication Review case  (still waiting for a central line and nobody has updated family)   Team Member Name Dr. Markham Courts Team Role Resident   Method of Communication Secure Message   Response Other (Comment)  (will address after rounds)   Notification Time [id number] [facility] Eugenio, RN

## 2023-02-14 NOTE — FLOWSHEET NOTE
02/14/23 1230   Treatment Team Notification   Reason for Communication Abnormal vitals; Evaluate; Review case   Team Member Name Dr. Jessica Bazzi Attending Provider   Method of Communication Face to face   Response At bedside   Notification Time Darcie Monroy, RN

## 2023-02-14 NOTE — CONSULTS
Division of Vascular Surgery        New Consult      Physician Requesting Consult: Dr. Long Coreas    Reason for Consult:   lack of pulses of right lower extremity after IABP    Chief Complaint:      Cardiac arrest     History of Present Illness:      Angleia Swanson is a 50 y.o. woman who presents as a cardiac Vfib arrest found to have a STEMI. She was taken emergently to the cath lab with cardiology PCI. She was found to have 95% stenosis of LAD with thrombus. A drug eluting stent placed as well as an intra-aortic balloon pump. Post intervention, the patient required significant vasopressor support with levophed at 100 mcg/min, vasopressin and phenylephrine. Vascular surgery was consulted due to loss of signals to the right lower extremity and for hemodialysis access. Medical history includes spontaneous pneumothorax, hypotension,JESUS, and chronic tobacco use. Surgical history includes hysterectomy and thoracostomy tube placement.      Medical History:     Past Medical History:   Diagnosis Date    Collapsed lung     Depression     Headache(784.0)     Hypotension     Spontaneous pneumothorax     Vasodepressor syncope        Surgical History:     Past Surgical History:   Procedure Laterality Date    CERVIX SURGERY N/A 3/17/2021    CERVIX ABLATION LASER-CO2 VAPORIZATION OF GENITAL CONDYLOMA performed by Noé Doan MD at [address] [place] [address]  03/17/2021    genital    HYSTERECTOMY (CERVIX STATUS UNKNOWN)      TUBAL LIGATION      VAGINA SURGERY  06/08/2022    Wide excision    VAGINA SURGERY N/A 6/8/2022    VAGINAL BIOPSY EXCISION - WIDE LOCAL performed by Noé Doan MD at [facility] AT THE [facility]       Family History:     Family History   Problem Relation Age of Onset    Diabetes Mother     Thyroid Disease Mother     Arthritis Mother     Cancer Father     Stroke Father     Asthma Father     Emphysema Father     Seizures Sister     Diabetes Sister     No Known Problems Maternal Grandmother No Known Problems Maternal Grandfather     No Known Problems Paternal Grandmother     No Known Problems Paternal Grandfather     Other Other         no family h/o breast or ovarian cancer        Allergies:       Codeine    Medications:      Current Facility-Administered Medications   Medication Dose Route Frequency Provider Last Rate Last Admin    heparin 25,000 units in dextrose 5 % 250 mL infusion (rate based)  5-30 Units/kg/hr IntraVENous Continuous Latosha Goodrich MD   Paused at 02/14/23 1540    amiodarone (CORDARONE) 450 mg in dextrose 5 % 250 mL infusion  1 mg/min IntraVENous Continuous Assumpta C Rochelle Tucker MD 16.7 mL/hr at 02/14/23 1357 0.5 mg/min at 02/14/23 1357    propofol injection  5-50 mcg/kg/min IntraVENous Continuous Les Patiño MD   Stopped at 02/14/23 0601    sodium chloride flush 0.9 % injection 5-40 mL  5-40 mL IntraVENous 2 times per day Constantin Cote MD   10 mL at 02/14/23 1045    sodium chloride flush 0.9 % injection 5-40 mL  5-40 mL IntraVENous PRN Constantin Cote MD        0.9 % sodium chloride infusion   IntraVENous PRN Constantin Cote MD 44.5 mL/hr at 02/14/23 1113 95 mcg/min at 02/14/23 1113    acetaminophen (TYLENOL) tablet 650 mg  650 mg Oral Q4H PRN Constantin Cote MD        atorvastatin (LIPITOR) tablet 80 mg  80 mg Oral Nightly Constantin Cote MD        [START ON 2/15/2023] aspirin chewable tablet 81 mg  81 mg Oral Daily Constantin Cote MD        ticagrelor (BRILINTA) tablet 90 mg  90 mg Oral BID Constantin Cote MD   90 mg at 02/14/23 1200    furosemide (LASIX) injection 20 mg  20 mg IntraVENous BID Constantin Cote MD   20 mg at 02/14/23 1119    heparin (porcine) injection 4,000 Units  4,000 Units IntraVENous PRN Constantin Cote MD        heparin (porcine) injection 2,000 Units  2,000 Units IntraVENous PRN Constantin Cote MD        midazolam (VERSED) 1 mg/mL in NS infusion  1-10 mg/hr IntraVENous Continuous Constantin Cote MD 5 mL/hr at 02/14/23 1410 5 mg/hr at 02/14/23 1410    vasopressin 20 Units in sodium chloride 0.9 % 100 mL infusion  0.01-0.03 Units/min IntraVENous Continuous Damien Frey MD 9 mL/hr at 02/14/23 1354 0.03 Units/min at 02/14/23 1354    fentaNYL 50 mcg/mL 50 mL infusion   mcg/hr IntraVENous Continuous Damien Frey MD 1 mL/hr at 02/14/23 1408 50 mcg/hr at 02/14/23 1408    insulin regular (HUMULIN R;NOVOLIN R) 100 Units in sodium chloride 0.9 % 100 mL infusion  1-50 Units/hr IntraVENous Continuous Estephania Horn MD 3.2 mL/hr at 02/14/23 1528 3.24 Units/hr at 02/14/23 1528    glucose chewable tablet 16 g  4 tablet Oral PRN Estephania Horn MD        dextrose bolus 10% 125 mL  125 mL IntraVENous PRN Estephania Horn MD        Or    dextrose bolus 10% 250 mL  250 mL IntraVENous PRN Estephania Horn MD        glucagon (rDNA) injection 1 mg  1 mg SubCUTAneous PRN Estephania Horn MD        dextrose 10 % infusion   IntraVENous Continuous PRN Estephania Horn MD        norepinephrine (LEVOPHED) 32 mg in sodium chloride 0.9 % 250 mL infusion  1-100 mcg/min IntraVENous Continuous Gayathri Quarles RN 46.9 mL/hr at 02/14/23 1352 100 mcg/min at 02/14/23 1352    famotidine (PEPCID) 20 mg in sodium chloride (PF) 0.9 % 10 mL injection  20 mg IntraVENous BID Estephania Horn MD   20 mg at 02/14/23 1544    ampicillin-sulbactam (UNASYN) 1,500 mg in sodium chloride 0.9 % 50 mL IVPB (mini-bag)  1,500 mg IntraVENous Kalani Butler MD        magnesium sulfate 2000 mg in 50 mL IVPB premix  2,000 mg IntraVENous Once Damien Frey MD        EPINEPHrine (EPINEPHrine HCL) 1 mg/mL 5 mg in sodium chloride 0.9 % 250 mL infusion  1-30 mcg/min IntraVENous Continuous Damien Frey MD        prismaSATE BK 0/3.5 5,000 mL with potassium chloride 10 mEq solution  1,500 mL/hr Dialysis Continuous Ofelia Hahn MD        sodium bicarbonate 150 mEq in sterile water infusion   IntraVENous Continuous Ofelia Hahn MD        heparin (porcine) injection 1,600 Units  1,600 Units IntraCATHeter PRN Alicia De La Cruz MD        heparin (porcine) injection 1,500 Units  1,500 Units IntraCATHeter PRN Belkis Moe MD        phenylephrine (IRMA-SYNEPHRINE) 100 mg in sodium chloride 0.9 % 250 mL infusion   mcg/min IntraVENous Continuous Hanh Cano MD           Social History:     Tobacco:    reports that she has been smoking cigarettes. She has a 15.00 pack-year smoking history. She has never used smokeless tobacco.  Alcohol:      reports no history of alcohol use. Drug Use:  reports no history of drug use. Review of Systems:     Review of Systems   Unable to perform ROS: Acuity of condition     Physical Exam:     Vitals:  BP (!) 141/93   Pulse (!) 130   Temp 98.6 °F (37 °C)   Resp 17   Ht 5' 5\" (1.651 m)   LMP 04/07/2008   SpO2 100%   BMI 26.63 kg/m²     Physical Exam  Constitutional:       Comments: Intubated    HENT:      Head: Normocephalic and atraumatic. Right Ear: External ear normal.      Left Ear: External ear normal.      Mouth/Throat:      Mouth: Mucous membranes are dry. Comments: ETT in place/ OGT in place  Neck:      Comments: Trachea midline  Cardiovascular:      Rate and Rhythm: Tachycardia present. Rhythm irregular. Comments: IABP in right femoral artery, no doppler signals to right popliteal, DP or PT. Left popliteal, DP and PT signals present. Abdominal:      General: There is no distension. Tenderness: There is no abdominal tenderness. Musculoskeletal:         General: No deformity or signs of injury. Skin:     Capillary Refill: Capillary refill takes more than 3 seconds. Coloration: Skin is pale. Comments: Right lower extremity cool to touch, foot with mottle appearance. Neurological:      Comments: Intubated,        Imaging/Labs:     XR CHEST PORTABLE   Final Result   Devices as above. Extensive scattered pulmonary opacities consistent with   multifocal airspace disease. There has been interval placement of a Jack   type catheter on the right. No postplacement pneumothorax. XR CHEST PORTABLE   Final Result   The intra-aortic balloon pump marker appears to be just above the AP window   (although the aortic knob is not well visualized). Tip of the endotracheal tube is now in expected position approximately 2-3 cm   above the cara. XR CHEST PORTABLE   Final Result   IABP tip projects near the aortic knob, about 3.5 cm from cara. Stable diffuse patchy and interstitial opacities, nonspecific and may be seen   with pulmonary edema, infection, or ARDS. Endotracheal tube tip is 1 cm from cara, consider retracting 2-3 cm. Request for the core team to call results to the primary team was made   2/14/2023 at 7:28 am.         XR ABDOMEN FOR NG/OG/NE TUBE PLACEMENT   Final Result   Nasogastric tube tip is in the stomach         XR CHEST PORTABLE   Final Result   Extensive areas of patchy and dense opacities are noted throughout the   bilateral lungs suggestive of an extensive interstitial and airspace disease   process. CT HEAD WO CONTRAST    (Results Pending)   CT CHEST PULMONARY EMBOLISM W CONTRAST    (Results Pending)   CT ABDOMEN W CONTRAST Additional Contrast? None    (Results Pending)         Assessment and Plan:     No acute vascular surgery intervention at this time. Continue heparinization. Recommend removal of IABP as early as possible. Cardiac perfusion benefits outweigh removal of balloon catheter at this time. Continue to monitor RLE. Electronically signed by Jaylen Chester MD on 2/14/23 at 4:49 PM [address]: [phone]  C: [phone]  Email: [email or web address]

## 2023-02-14 NOTE — PROGRESS NOTES
[facility] CARE DEPARTMENT - [address]     Emergency/Trauma Note    PATIENT NAME: Richy Lezama    Shift date: 2/13/2023  Shift day: Monday   Shift # 3    Room # ED13/ED13  Name: Richy Lezama            Age: 50 y.o. Gender: female          Gnosticist: None   Place of Yazidi: Unknown    Trauma/Incident type: Stemi Alert  Admit Date & Time: 2/14/2023  2:59 AM  TRAUMA NAME: N/A    ADVANCE DIRECTIVES IN CHART? No    NAME OF DECISION MAKER: Per next of kin hierarchy, patient's primary decision maker is her daughter, Monica Cash ([phone]). RELATIONSHIP OF DECISION MAKER TO PATIENT: Patient's Daughter    PATIENT/EVENT DESCRIPTION:  Richy Lezama is a 50 y.o. female who arrived as a transfer from [address] [place] [address] to [facility] and was paged out as a \"STEMI Alert. \" Per report, patient \"coded four times prior to arrival to the hospital.\" Patient was \"intubated\" at time of visit. Pt to be admitted to 1015/1015-01. SPIRITUAL ASSESSMENT-INTERVENTION-OUTCOME:   responded to page and gathered patient's information.  worked alongside ED Registration to locate patient's daughter's phone number.  reached Monica Cash, by phone, and she indicated that she had just arrived to the ED Waiting Room.  greeted her, her fiance, and patient's sister in waiting room.  facilitated medical update between family and ED Resident.  assisted family to the Cath Lab Waiting Room and informed Cath team of their location. Patient's family members expressed concern for patient and indicated that she has been under [place] lot of stress lately. \"  provided support and hospitality to them. Patient's family thanked  for care and support. PATIENT BELONGINGS:  No belongings noted    ANY BELONGINGS OF SIGNIFICANT VALUE NOTED:  N/A    REGISTRATION STAFF NOTIFIED?   Yes      WHAT IS YOUR SPIRITUAL CARE PLAN FOR THIS PATIENT?:  Chaplains can make follow-up visit, per request. Kevinaxel Sandrabraxton can be reached 24/7 via [employer].       02/14/23 0316   Encounter Summary   Service Provided For: Family   Referral/Consult From: Multi-disciplinary team  (STEMI Alert)   Support System Children;Family members   Last Encounter  02/13/23   Complexity of Encounter High   Begin Time 0316   End Time  0442   Total Time Calculated 86 min   Encounter    Type Initial Screen/Assessment   Crisis   Type Code STEMI   Spiritual/Emotional needs   Type Spiritual Support   Assessment/Intervention/Outcome   Assessment Coping   Intervention Sustaining Presence/Ministry of presence   Outcome Receptive   Plan and Referrals   Plan/Referrals Continue to visit, (comment)  (as needed)     Electronically signed by Peyton Sandhu on 2/14/2023 at 8:26 AM.  Curt Reynolds  [phone]

## 2023-02-14 NOTE — ED NOTES
The following labs were labeled with appropriate pt sticker and tubed to lab:     [x] Blue     [x] Lavender   [] on ice  [x] Green/yellow  [x] Green/black [] on ice  [] Rhonda Sivan  [] on ice  [x] Yellow  [] Red  [] Type/ Screen  [] ABG  [] VBG    [] COVID-19 swab    [] Rapid  [] PCR  [] Flu swab  [] Peds Viral Panel     [] Urine Sample  [] Fecal Sample  [] Pelvic Cultures  [] Blood Cultures  [] X 2  [] STREP Cultures         Ade Solano RN  02/14/23 1547

## 2023-02-14 NOTE — CONSULTS
Renal Consult Note    Patient :  Carlitos Alston; 50 y.o. MRN# [id number]  Location:  [facility]/0635-  Attending:  Shelly Romero MD  Admit Date:  2/14/2023   Hospital Day: 0    Reason for Consult:     Asked by Dr Shelly Romero MD to see for AMAURI/Elevated Creatinine. History Obtained From:     non-family caregiver - chart, electronic medical record    History of Present Illness:     Barb Lovett Else; 50 y.o. female with past medical history as mentioned below presented to the hospital with the chief complaint of chest pain to [facility], ER. She has known history of hyperlipidemia and depression and mild intermittent asthma. Has been on inhalers, Klonopin and Seroquel and estrogens at home. Does not take any other medicines. She does not have any known history of kidney disease, baseline creatinine is 0.8-0.9 range. She presented to [facility], ER as described above with symptoms of chest pain. In the ER she went pulseless and had full-blown cardiac arrest.  Was given epinephrine and amiodarone and defibrillated twice. ROSC was established. Subsequently during the process of intubation she had another full-blown arrest requiring further resuscitation procedures. EKG done in the ER showed ST elevation MI in the anterior leads. She was then transferred to [facility]. Nikki Was on epinephrine and Levophed and amiodarone drip on initial presentation. She was profoundly acidotic with a pH of 7.09, bicarbonate 16 PCO2 57. She was taken emergently to the Cath Lab underwent stent placement to the mid LAD. Postprocedure she is been placed on a balloon pump. Cardiogenic shock is persisting. She is now on 3 pressors with systolic pressure in the 04T. She is currently on Levophed vasopressin and Santi-Synephrine. Has been maxed out on those. Lower extremity pulses are barely palpable especially on the right side. Circulation is being maintained with the balloon pump though.   She is also had persistent metabolic and respiratory acidosis. Lactic acid is now up to 6.8. Acidosis continue to persist and even worsened despite patient being on a bicarbonate infusion. Creatinine down to 1.2, urine output has decreased to less than 10 mL an hour. Nephrology has been consulted for acute kidney injury, profound anion gap metabolic acidosis, nongap acidosis and metabolic acidosis. Labs this morning showed a sodium of 137 potassium 3.9 chloride 107 bicarb 14 creatinine 1.2  CODE STATUS was discussed with family they still wish her to be a full code at present. Past History/Allergies? Social History:     Past Medical History:   Diagnosis Date    Collapsed lung     Depression     Headache(784.0)     Hypotension     Spontaneous pneumothorax     Vasodepressor syncope        Allergies   Allergen Reactions    Codeine Itching       Social History     Socioeconomic History    Marital status: Legally      Spouse name: Zenia Hodge    Number of children: 3    Years of education: 11    Highest education level: 11th grade   Occupational History    Not on file   Tobacco Use    Smoking status: Every Day     Packs/day: 1.00     Years: 15.00     Pack years: 15.00     Types: Cigarettes    Smokeless tobacco: Never   Vaping Use    Vaping Use: Never used   Substance and Sexual Activity    Alcohol use: Never    Drug use: No    Sexual activity: Yes     Partners: Male     Birth control/protection: Surgical     Comment: pt has had a hysterectomy   Other Topics Concern    Not on file   Social History Narrative    Not on file     Social Determinants of Health     Financial Resource Strain: Low Risk     Difficulty of Paying Living Expenses: Not hard at all   Food Insecurity: No Food Insecurity    Worried About Running Out of Food in the Last Year: Never true    Ran Out of Food in the Last Year: Never true   Transportation Needs: Unknown    Lack of Transportation (Medical): Not on file    Lack of Transportation (Non-Medical):  No   Physical Activity: Not on file   Stress: Not on file   Social Connections: Not on file   Intimate Partner Violence: Not on file   Housing Stability: Unknown    Unable to Pay for Housing in the Last Year: Not on file    Number of Places Lived in the Last Year: Not on file    Unstable Housing in the Last Year: No       Family History:        Family History   Problem Relation Age of Onset    Diabetes Mother     Thyroid Disease Mother     Arthritis Mother     Cancer Father     Stroke Father     Asthma Father     Emphysema Father     Seizures Sister     Diabetes Sister     No Known Problems Maternal Grandmother     No Known Problems Maternal Grandfather     No Known Problems Paternal Grandmother     No Known Problems Paternal Grandfather     Other Other         no family h/o breast or ovarian cancer        Outpatient Medications:     Medications Prior to Admission: QUEtiapine (SEROQUEL XR) 50 MG extended release tablet, Take 1 tablet by mouth nightly  clonazePAM (KLONOPIN) 1 MG tablet, Take 1 tablet by mouth 2 times daily as needed for Anxiety for up to 30 days.  estrogens, conjugated, (PREMARIN) 0.625 MG tablet, Take 1 tablet by mouth daily  albuterol sulfate HFA (VENTOLIN HFA) 108 (90 Base) MCG/ACT inhaler, Inhale 2 puffs into the lungs every 6 hours as needed for Wheezing    Current Medications:     Scheduled Meds:    atropine        sodium chloride flush  5-40 mL IntraVENous 2 times per day    atorvastatin  80 mg Oral Nightly    [START ON 2/15/2023] aspirin  81 mg Oral Daily    vecuronium        ticagrelor  90 mg Oral BID    furosemide  20 mg IntraVENous BID    famotidine (PEPCID) injection  20 mg IntraVENous BID    ampicillin-sulbactam  1,500 mg IntraVENous Q8H    sodium bicarbonate        sodium bicarbonate  25 mEq IntraVENous Once    potassium chloride  20 mEq IntraVENous Once    magnesium sulfate  2,000 mg IntraVENous Once     Continuous Infusions:    heparin (PORCINE) Infusion 8 Units/kg/hr (02/14/23 3089)     amiodarone 450mg/250ml D5W infusion 0.5 mg/min (23 1357)    propofol Stopped (23 0601)    sodium chloride 95 mcg/min (23 1113)    midazolam 5 mg/hr (23 1410)    vasopressin (Septic Shock) infusion 0.03 Units/min (23 1354)    fentaNYL 50 mcg/mL 50 mcg/hr (23 1408)    insulin 4.36 Units/hr (23 1415)    dextrose      norepinephrine (LEVOPHED) infusion (double concentration) 100 mcg/min (23 1352)    phenylephrine 300 mcg/min (23 1350)    sodium bicarbonate infusion 150 mL/hr at 23 1349     PRN Meds:  sodium chloride flush, sodium chloride, acetaminophen, heparin (porcine), heparin (porcine), glucose, dextrose bolus **OR** dextrose bolus, glucagon (rDNA), dextrose    Review of Systems:     Review of systems not possible as patient is on vent     Input/Output:       I/O last 3 completed shifts:  In: -   Out: 235 [Urine:235]  No data found. Vital Signs:   Temperature:  Temp: 98.6 °F (37 °C)  TMax:   Temp (24hrs), Av.3 °F (35.7 °C), Min:92.3 °F (33.5 °C), Max:99 °F (37.2 °C)    Respirations:  Resp: 17  Pulse:   Heart Rate: (!) 130  BP:    BP: (!) 141/93  BP Range: Systolic (02MUC), PRW:530 , Min:75 , EMS:640       Diastolic (44IYY), GIBSON:73, Min:18, Max:140      Physical Examination:     General:  Sedated on ventilator. FiO2 100% with a PEEP of 10  HEENT:  Atraumatic, normocephalic, no throat congestion, moist mucosa. Eyes:   Pupils equal, round and reactive to light, pallor, no icterus. Neck:   No JVD, no thyromegaly, no lymphadenopathy. Chest:  Air entry fair bilaterally, decreased air entry bilaterally basal crackles  Cardiac: S1 S2 RR no murmurs  Abdomen:  Soft, non-tender, no masses or organomegally appreciable, BS sluggish. :   No suprapubic or flank tenderness. Neuro:  Sedated on ventilator. Skin:   No rashes, good skin turgor. Extremities:  No edema, palpable peripheral pulses, no cyanosis, no calf tenderness.   Sluggish to absent peripheral pulses    Labs:       Recent Labs     02/14/23  0048 02/14/23  0552 02/14/23  0632   WBC 11.1 33.2* 39.6*   RBC 3.93* 3.17* 3.75*   HGB 13.3 10.4* 12.3   HCT 38.1 33.3* 39.6   MCV 96.9 105.0* 105.6*   MCH 33.8* 32.8 32.8   MCHC 34.9* 31.2 31.1   RDW 13.2 14.1 13.7    249 287   MPV 9.9 9.5 9.2      BMP:   Recent Labs     02/14/23  0307 02/14/23  0308 02/14/23  0632 02/14/23  0839 02/14/23  0844     --  138 137  --    K <1.5*  --  4.1 3.9  --    *  --  110* 107  --    CO2 <6*  --  15* 14*  --    BUN 4*  --  16 16  --    CREATININE <0.20*   < > 1.12* 1.18* 1.34*   GLUCOSE 279*  --  352* 366*  --    CALCIUM 2.0*  --  7.3* 7.4*  --     < > = values in this interval not displayed. Phosphorus:   No results for input(s): PHOS in the last 72 hours.   Magnesium:    Recent Labs     02/14/23 0307 02/14/23  0632 02/14/23  0839   MG 0.6* 1.9 1.8     Albumin:    Recent Labs     02/14/23 0048 02/14/23  0307   LABALBU 4.2 0.9*     BNP:    No results found for: BNP  PRICE:    No results found for: PRICE  SPEP:  Lab Results   Component Value Date/Time    PROT 1.5 02/14/2023 03:07 AM     UPEP:   No results found for: LABPE  C3:   No results found for: C3  C4:   No results found for: C4  MPO ANCA:   No results found for: MPO  PR3 ANCA:   No results found for: PR3  Anti-GBM:   No results found for: GBMABIGG  Hep BsAg:       No results found for: HEPBSAG  Hep C AB:        No results found for: HEPCAB    Urinalysis/Chemistries:      Lab Results   Component Value Date/Time    NITRU NEGATIVE 02/14/2023 07:49 AM    COLORU Yellow 02/14/2023 07:49 AM    PHUR 5.5 02/14/2023 07:49 AM    WBCUA 5 TO 10 02/14/2023 07:49 AM    RBCUA 0 TO 2 02/14/2023 07:49 AM    MUCUS 2+ 12/09/2014 02:29 PM    TRICHOMONAS NOT REPORTED 12/09/2014 02:29 PM    YEAST NOT REPORTED 12/09/2014 02:29 PM    BACTERIA TRACE 12/09/2014 02:29 PM    CLARITYU clear 09/22/2020 01:45 PM    SPECGRAV 1.017 02/14/2023 07:49 AM    LEUKOCYTESUR SMALL 02/14/2023 07:49 AM    UROBILINOGEN Normal 02/14/2023 07:49 AM    BILIRUBINUR NEGATIVE 02/14/2023 07:49 AM    BILIRUBINUR small 09/22/2020 01:45 PM    BLOODU trace 09/22/2020 01:45 PM    GLUCOSEU 3+ 02/14/2023 07:49 AM    KETUA NEGATIVE 02/14/2023 07:49 AM    AMORPHOUS 1+ 02/14/2023 07:49 AM     Urine Sodium:   No results found for: BERT  Urine Potassium:  No results found for: KUR  Urine Chloride:  No results found for: CLUR  Urine Osmolarity: No results found for: OSMOU  Urine Protein:   No results found for: TPU  Urine Creatinine:     Lab Results   Component Value Date/Time    LABCREA 76.6 09/19/2017 01:40 PM     Urine Eosinophils:  No components found for: UEOS    Radiology:     CXR:     Assessment:     1. Acute Kidney Injury: Secondary to ischemic and nephrotoxic ATN from cardiogenic shock, contrast exposure. Baseline creatinine 0.8 which is now up to 1.3 urine output is less than 10 mL an hour for the last 24 hours. Acute kidney injury stage III  2. Anion gap and nongap metabolic acidosis and respiratory acidosis secondary to acute kidney injury, cardiogenic shock with progressive worsening lactic acidosis and alveolar under ventilation  3. Multisystem organ failure including respiratory failure, ischemic hepatitis, kidney failure and encephalopathy  4. Severe ischemic cardiomyopathy ejection fraction 20%  5. Hyperglycemia from stress induced increased blood sugar along with D5 use    Plan:   1. Change fluids to sterile water 150 mg sodium bicarbonate at 200 mL an hour  2. Discussed with patient's daughter about poor prognosis and multisystem organ failure they still wish everything to be done. Keeping that in mind we will initiate CVVHD in the hopes that I might be able to stabilize her metabolic state and achieve better hemodynamic stability although that appears to be a long shot at present  3. Pressor support and balloon pump per cardiology  4. We will have vascular put a dialysis catheter in   5.   CRRT orders written   6. Prognosis extremely poor  7. We will follow  Nutrition   Please ensure that patient is on a renal diet/TF. Avoid nephrotoxic drugs/contrast exposure. Thank you for the consultation. Please do not hesitate to contact us for any further questions/concerns. We will continue to follow along with you.

## 2023-02-14 NOTE — ED PROVIDER NOTES
[facility]   Emergency Department  Faculty Attestation       I performed a history and physical examination of the patient and discussed management with the resident. I reviewed the residents note and agree with the documented findings including all diagnostic interpretations and plan of care. Any areas of disagreement are noted on the chart. I was personally present for the key portions of any procedures. I have documented in the chart those procedures where I was not present during the key portions. I have reviewed the emergency nurses triage note. I agree with the chief complaint, past medical history, past surgical history, allergies, medications, social and family history as documented unless otherwise noted below. Documentation of the HPI, Physical Exam and Medical Decision Making performed by constance is based on my personal performance of the HPI, PE and MDM. For Physician Assistant/ Nurse Practitioner cases/documentation I have personally evaluated this patient and have completed at least one if not all key elements of the E/M (history, physical exam, and MDM). Additional findings are as noted. Pertinent Comments     Primary Care Physician: Ewelina Sanchez, APRN - CNP      ED Triage Vitals   BP Temp Temp Source Heart Rate Resp SpO2 Height Weight   02/14/23 0300 02/14/23 0315 02/14/23 0315 02/14/23 0300 02/14/23 0300 02/14/23 0306 -- --   (!) 75/61 (!) 92.3 °F (33.5 °C) Bladder 66 18 (!) 26 %          This is a 50 y.o. female who presents to the Emergency Department as a transfer from [facility] after cardiac arrest.  Per prior physician patient had been recently seen in their ED for chest pain and discharged with plans for outpatient stress. Returned to [facility] tonight for chest pain. Reported course of events at [facility]:  Vfib cardiac arrest - 2 rounds of Epi + defib w/ ROSC  Started amio  Intubated  Repeat vfib arrest with 1 epi and defib.    I accepted patient via transfer line and did request that lisa speak to interventional cardiology. Epi drip  LF transported - they provided versed for sedation and switched epinephrine to levophed. On arrival to the ED here patient was intubated and sedated. Grey/ashen in color. Heart sounds regular. Lungs with diffuse crackels and ETT in place. Abdomen soft. GCS 3T. However, when sedation wearing off patiuent is moving arms towards ETT. Medical Decision Making  Post-arrest patient transfer. Pale and grey in color on arrival on Levophed and Amiodarone    Delay in accessing patient's chart and ordering meds/labs due to patient not being discharged out of [facility]'s ED     Respiratory Status  Hypoxia and low end tidal CO2. CXR from [facility] evaluated and showed bilateral diffuse pulmonary edema. Repeat xray in the ED shows worsening of this. Place on ARDS protocol. Developed increased work of breathing and patient was taken from vent and bagged with BVM with significant improvement. Glidescope used by Dr. Pascale Prince to confirm tube placement. ABG obtained to look at O2 sat due to poor waveform. Of note color, ETCO2 and O2 improved with baggin of patient. Post-arrest  Place temperature sensing chan   Basic labs  EKG ordered, cannot see prior. Propofol for sedation with Levophed to facilitate easier mental status checks if tolerates. EKG shows STEMI at 3:02 AM     STEMI ALERT:  Paged by [facility]   See times in resident documentation  I spoke directly to Dr. Nimco Fuller at 3:07 AM who activated cath lab. Performed chart review with him and no heparin or aspirin had been given  Heparin 4000 units ordered. Hernan does have some dark NG tube output and blood in the oropharynx, however given the STEMI risk vs benefit weighed and decision to give heparin bolus was made  ASA rectally.          Problems Addressed:  ARDS (adult respiratory distress syndrome) ([facility] [address]): acute illness or injury     Details: Xray reviwed from [place]. Cardiac arrest [facility]): acute illness or injury  Respiratory acidosis: acute illness or injury  ST elevation myocardial infarction (STEMI), unspecified artery [facility]): acute illness or injury    Amount and/or Complexity of Data Reviewed  Independent Historian: EMS     Details: and outlying facility  External Data Reviewed: labs and radiology. Labs: ordered. Radiology: ordered and independent interpretation performed. Details: CXR from [place] shows that ETT is close to cara with singificant b/l pulmonary edema. Repeat CXR in the ED shows good tube placement with worsening bilateraly pulm edema. No signs of mediatstinal widening  ECG/medicine tests: ordered and independent interpretation performed. Details: STEMI  Discussion of management or test interpretation with external provider(s): Interventional cardiology     Risk  Prescription drug management. Decision regarding hospitalization. EKG Interpretation    Interpreted by emergency department physician    Clinical Impression: Normal sinus with elevations in V2- V6 and depressions in III and aVF. STEMI alert called. Emely Mcneill MD      Critical Care: There was significant risk of life threatening deterioration of patient's condition requiring my direct management. Critical care time 40 minutes, excluding any documented procedures.     Emely Mcneill MD  Attending Emergency Physician        Emely Mcneill MD  02/14/23 1588

## 2023-02-14 NOTE — PROGRESS NOTES
[facility]   Progress Note                    Date:   2/14/2023  Patient name:  Cuong Wells  Date of admission:  2/14/2023  2:59 AM  MRN:   [id number]  YOB: 1975  PCP:    KENDALL Mcdonnell CNP    Reason for Admission:  Cardiac arrest [facility]) [I46.9]  Other specified hypotension [I95.89]  ARDS (adult respiratory distress syndrome) ([facility] [J80]  Respiratory acidosis [P54.24]  Metabolic acidosis [P48.55]  STEMI (ST elevation myocardial infarction) ([facility] [I21.3]  Hypothermic shock, initial encounter [T68. XXXA]  ST elevation myocardial infarction (STEMI), unspecified artery ([facility] [I21.3]    Subjective:   Clinical Changes / Abnormalities:    Patient seen and examined at bedside. Patient remains intubated and sedated. Currently on levophed gtt @ 90 with IABP at 1:1 setting with heparin gtt. Patient additionally is also on amiodarone gtt in the setting of V fib arrest prior to presentation. Underwent manual thrombectomy and PTCA/CORNELL of mid LAD.      Urine output in the last 24 hours:     Intake/Output Summary (Last 24 hours) at 2/14/2023 0839  Last data filed at 2/14/2023 0741  Gross per 24 hour   Intake 225.78 ml   Output 235 ml   Net -9.22 ml     I/O since admission: -9.2 cc    Medications:   Scheduled Meds:   atropine        sodium chloride flush  5-40 mL IntraVENous 2 times per day    atorvastatin  80 mg Oral Nightly    [START ON 2/15/2023] aspirin  81 mg Oral Daily    vecuronium        ticagrelor  90 mg Oral BID    furosemide  20 mg IntraVENous BID     Continuous Infusions:   heparin (PORCINE) Infusion Stopped (02/14/23 0736)    amiodarone 450mg/250ml D5W infusion 1 mg/min (02/14/23 0741)    propofol Stopped (02/14/23 0601)    norepinephrine 90 mcg/min (02/14/23 0751)    sodium chloride Stopped (02/14/23 0719)    midazolam 5 mg/hr (02/14/23 0741)    sodium bicarbonate infusion 100 mL/hr at 02/14/23 0741    vasopressin (Septic Shock) infusion      fentaNYL 50 mcg/mL 25 mcg/hr (02/14/23 6755)    insulin      dextrose       CBC:   Recent Labs     02/14/23 0048 02/14/23  0552 02/14/23  0632   WBC 11.1 33.2* 39.6*   HGB 13.3 10.4* 12.3    249 287     BMP:    Recent Labs     02/14/23 0048 02/14/23  0307 02/14/23  0308 02/14/23  0626 02/14/23  0632    143  --   --  138   K 3.8 <1.5*  --   --  4.1    131*  --   --  110*   CO2 21 <6*  --   --  15*   BUN 12 4*  --   --  16   CREATININE 0.93* <0.20* 1.35* 1.15 1.12*   GLUCOSE 142* 279*  --   --  352*     Hepatic:   Recent Labs     02/14/23 0048 02/14/23 0307   AST 27 156*   ALT 14 133*   BILITOT 0.2* <0.1*   ALKPHOS 96 50     Troponin: No results for input(s): TROPONINI in the last 72 hours. No results for input(s): TROPONINT in the last 72 hours. BNP:   Recent Labs     02/14/23 0307   PROBNP 44      No results for input(s): BNP in the last 72 hours. Lipids: No results for input(s): CHOL, HDL in the last 72 hours. Invalid input(s): LDLCALCU  INR:   Recent Labs     02/14/23 0048   INR 1.0       Objective:   Vitals: BP (!) 135/117   Pulse (!) 117   Temp (!) 95.4 °F (35.2 °C)   Resp (!) 42   LMP 04/07/2008   SpO2 96%    Last Recorded Weight:  [unfilled]    Constitutional and General Appearance:   alert, cooperative, no distress and appears stated age  Respiratory:  No for increased work of breathing. On auscultation: diminished bilaterally  ETT in place  Cardiovascular: The apical impulse is not displaced  Heart tones are crisp and normal. Regular S1 and S2. No murmurs. IABP in place. Abdomen:   No masses or tenderness  Bowel sounds present  Extremities:   No Cyanosis or Clubbing   Lower extremity edema: No   Skin: Warm and dry  Neurological:  Intubated and sedated    Diagnostic Studies:     EKG: Anterior STEMI    ECHO:   pending    Cardiac Angiography: 2/14/23   Cardiac Arteries and Lesion Findings     LMCA: Normal 0% stenosis. LAD: mid 70% followed by 95% stenosis with thrombus. The D1 is small.        Lesion on Mid LAD: Proximal subsection. 95% stenosis 28 mm length reduced    to 0%. Pre procedure CASSANDRA III flow was noted. Post Procedure CASSANDRA III    flow was present. Good runoff was present. The lesion was diagnosed as    High Risk (C). The lesion was discrete. The lesion showed evidence of    thrombus presence, with irregular contour, mild angulation and mild    tortuosity. Lesion plaque is ruptured. LCx: Normal 0% stenosis. The OM1 is normal.     RCA: Normal 0% stenosis. Coronary Tree Dominance: Right     LV Analysis  LV function assessed as:Abnormal.  Ejection Fraction 20%      Conclusions:   Neisha-infarct Ventricular fibrillation. Anterior STEMI. Acute hypoxic respiratory failure   Acute pulmonary edema   Cardiogenic shock. One vessel coronary artery disease. Severe LV systolic dysfunction; LVEF 20%. Manual thrombectomy of mid LAD. Successful PTCA/CORNELL of mid LAD. Successful placement of IABP. Recommendations:   Medical therapy as needed. Risk factor modification. Routine Post PCI Orders. TTE. Life vest prior to discharge. Assessment / Acute Cardiac Problems:   Anterior STEMI s/p manual thrombectomy of mid LAD followed by PTCA/CORNELL of mid LAD  Cardiogenic shock s/p IABP  Ventricular fibrillation s/p Anterior STEMI on ROSC  Acute hypoxic respiratory failure  Acute pulmonary Edema  Cardiomyopathy LVEF 20% on LV gram, 2D ECHO pending  AMAURI   DLD  HTN  T2DM  GERD  Smoker  Anxiety    Plan of Treatment:   Continue ASA and brilinta  Continue atorvastatin 80 mg nightly  Continue lasix 20 IV BID  IABP on 1:1. Continue heparin gtt  Continue amiodarone gtt  Currently on levophed gtt @ 90. Wean as tolerated. MAP goal >65. If MAP goal not achieved start vasopressin gtt. 2D ECHO pending  Plan to start BB once Cardiogenic shock resolves  Currently intubated and sedated.  Pulmonary/Critical Care on board, appreciate recommendations  Continue fentanyl gtt and versed gtt  Continue insulin gtt per Critical Care recommendations  If kidney function does not improve will consider Nephrology consult. K>4, Mg>2    Venkat Solorio MD  Fellow, [address] [facility]      Attending note,   Patient was seen and examined agree with the above evaluation and plan. Intubated. She presented with a STEMI and V-fib cardiac arrest.  S/p CORNELL of the LAD. In cardiogenic shock. Severe acidosis with pH of 6.8 on presentation. Current pH is around 7. She is on bicarb drip. Pulmonary is following. We will consult nephrology. On multiple pressors. On IV amiodarone. Will obtain immediate echocardiogram.  Her LV function was severely reduced on echo. Her prognosis is very guarded considering her severe acidosis and V-fib cardiac arrest.  Detailed discussion with the family. We will consult vascular surgery due to cold right lower extremity which is a site of the intra-aortic balloon pump.

## 2023-02-14 NOTE — PROGRESS NOTES
Pt arrived to the ER via [facility] with a 7.5 ETT secured at 25cm at the teeth. Placed on ventilator with setting from Dr. Tasha Villalobos. Will continue to monitor.

## 2023-02-15 ENCOUNTER — APPOINTMENT (OUTPATIENT)
Dept: CARDIAC CATH/INVASIVE PROCEDURES | Age: 48
End: 2023-02-15
Payer: MEDICAID

## 2023-02-15 ENCOUNTER — APPOINTMENT (OUTPATIENT)
Dept: GENERAL RADIOLOGY | Age: 48
End: 2023-02-15
Payer: MEDICAID

## 2023-02-15 VITALS
SYSTOLIC BLOOD PRESSURE: 126 MMHG | HEIGHT: 65 IN | BODY MASS INDEX: 26.63 KG/M2 | OXYGEN SATURATION: 100 % | DIASTOLIC BLOOD PRESSURE: 96 MMHG | RESPIRATION RATE: 33 BRPM | TEMPERATURE: 101.5 F | HEART RATE: 75 BPM

## 2023-02-15 PROBLEM — E87.20 METABOLIC ACIDOSIS: Status: ACTIVE | Noted: 2023-02-15

## 2023-02-15 PROBLEM — E87.29 RESPIRATORY ACIDOSIS: Status: ACTIVE | Noted: 2023-02-15

## 2023-02-15 PROBLEM — D72.829 LEUKOCYTOSIS: Status: ACTIVE | Noted: 2023-02-15

## 2023-02-15 PROBLEM — R57.0 CARDIOGENIC SHOCK (HCC): Status: ACTIVE | Noted: 2023-02-15

## 2023-02-15 PROBLEM — I50.1 CARDIOGENIC PULMONARY EDEMA (HCC): Status: ACTIVE | Noted: 2023-02-15

## 2023-02-15 PROBLEM — J96.02 ACUTE RESPIRATORY FAILURE WITH HYPOXIA AND HYPERCAPNIA (HCC): Status: ACTIVE | Noted: 2023-02-15

## 2023-02-15 PROBLEM — N17.9 AKI (ACUTE KIDNEY INJURY) (HCC): Status: ACTIVE | Noted: 2023-02-15

## 2023-02-15 PROBLEM — R73.9 HYPERGLYCEMIA: Status: ACTIVE | Noted: 2023-02-15

## 2023-02-15 PROBLEM — I25.110 CORONARY ARTERY DISEASE INVOLVING NATIVE CORONARY ARTERY OF NATIVE HEART WITH UNSTABLE ANGINA PECTORIS (HCC): Status: ACTIVE | Noted: 2023-02-15

## 2023-02-15 PROBLEM — J96.01 ACUTE RESPIRATORY FAILURE WITH HYPOXIA AND HYPERCAPNIA (HCC): Status: ACTIVE | Noted: 2023-02-15

## 2023-02-15 LAB
ACTION: NORMAL
ACTIVATED CLOTTING TIME: 182 SEC (ref 79–149)
ACTIVATED CLOTTING TIME: 182 SEC (ref 79–149)
ACTIVATED CLOTTING TIME: 186 SEC (ref 79–149)
ACTIVATED CLOTTING TIME: 190 SEC (ref 79–149)
ADENOVIRUS PCR: NOT DETECTED
ALBUMIN SERPL-MCNC: 2.2 G/DL (ref 3.5–5.2)
ALBUMIN SERPL-MCNC: 2.4 G/DL (ref 3.5–5.2)
ALBUMIN/GLOBULIN RATIO: 1.5 (ref 1–2.5)
ALBUMIN/GLOBULIN RATIO: 1.8 (ref 1–2.5)
ALLEN TEST: ABNORMAL
ALP SERPL-CCNC: 102 U/L (ref 35–104)
ALP SERPL-CCNC: 106 U/L (ref 35–104)
ALT SERPL-CCNC: 563 U/L (ref 5–33)
ALT SERPL-CCNC: 950 U/L (ref 5–33)
ANION GAP SERPL CALCULATED.3IONS-SCNC: 22 MMOL/L (ref 9–17)
ANION GAP SERPL CALCULATED.3IONS-SCNC: 22 MMOL/L (ref 9–17)
AST SERPL-CCNC: 1007 U/L
AST SERPL-CCNC: 1781 U/L
B PARAP IS1001 DNA NPH QL NAA+NON-PROBE: NOT DETECTED
B PERT DNA SPEC QL NAA+PROBE: NOT DETECTED
BILIRUB SERPL-MCNC: 1 MG/DL (ref 0.3–1.2)
BILIRUB SERPL-MCNC: 1.7 MG/DL (ref 0.3–1.2)
BUN SERPL-MCNC: 15 MG/DL (ref 6–20)
BUN SERPL-MCNC: 16 MG/DL (ref 6–20)
CA-I BLD-SCNC: 0.96 MMOL/L (ref 1.13–1.33)
CA-I BLD-SCNC: 0.97 MMOL/L (ref 1.13–1.33)
CALCIUM SERPL-MCNC: 6.5 MG/DL (ref 8.6–10.4)
CALCIUM SERPL-MCNC: 6.7 MG/DL (ref 8.6–10.4)
CHLAMYDIA PNEUMONIAE BY PCR: NOT DETECTED
CHLORIDE SERPL-SCNC: 100 MMOL/L (ref 98–107)
CHLORIDE SERPL-SCNC: 102 MMOL/L (ref 98–107)
CO2 SERPL-SCNC: 13 MMOL/L (ref 20–31)
CO2 SERPL-SCNC: 17 MMOL/L (ref 20–31)
CORONAVIRUS 229E PCR: NOT DETECTED
CORONAVIRUS HKU1 PCR: NOT DETECTED
CORONAVIRUS NL63 PCR: NOT DETECTED
CORONAVIRUS OC43 PCR: NOT DETECTED
CREAT SERPL-MCNC: 1.17 MG/DL (ref 0.5–0.9)
CREAT SERPL-MCNC: 1.19 MG/DL (ref 0.5–0.9)
DATE AND TIME: NORMAL
EKG ATRIAL RATE: 70 BPM
EKG P AXIS: 71 DEGREES
EKG P-R INTERVAL: 184 MS
EKG Q-T INTERVAL: 396 MS
EKG QRS DURATION: 126 MS
EKG QTC CALCULATION (BAZETT): 427 MS
EKG R AXIS: 11 DEGREES
EKG T AXIS: 46 DEGREES
EKG VENTRICULAR RATE: 70 BPM
FIO2: 100
GFR SERPL CREATININE-BSD FRML MDRD: 56 ML/MIN/1.73M2
GFR SERPL CREATININE-BSD FRML MDRD: 57 ML/MIN/1.73M2
GLUCOSE BLD-MCNC: 109 MG/DL (ref 74–100)
GLUCOSE BLD-MCNC: 114 MG/DL (ref 65–105)
GLUCOSE BLD-MCNC: 133 MG/DL (ref 74–100)
GLUCOSE BLD-MCNC: 83 MG/DL (ref 74–100)
GLUCOSE SERPL-MCNC: 116 MG/DL (ref 70–99)
GLUCOSE SERPL-MCNC: 137 MG/DL (ref 70–99)
HCT VFR BLD AUTO: 27.6 % (ref 36.3–47.1)
HGB BLD-MCNC: 9 G/DL (ref 11.9–15.1)
HUMAN METAPNEUMOVIRUS PCR: NOT DETECTED
INFLUENZA A BY PCR: NOT DETECTED
INFLUENZA B BY PCR: NOT DETECTED
LACTIC ACID, WHOLE BLOOD: 13.3 MMOL/L (ref 0.7–2.1)
LACTIC ACID, WHOLE BLOOD: 13.8 MMOL/L (ref 0.7–2.1)
MAGNESIUM SERPL-MCNC: 1.8 MG/DL (ref 1.6–2.6)
MAGNESIUM SERPL-MCNC: 2.3 MG/DL (ref 1.6–2.6)
MCH RBC QN AUTO: 33 PG (ref 25.2–33.5)
MCHC RBC AUTO-ENTMCNC: 32.6 G/DL (ref 28.4–34.8)
MCV RBC AUTO: 101.1 FL (ref 82.6–102.9)
MICROORGANISM SPEC CULT: NO GROWTH
MODE: ABNORMAL
MYCOPLASMA PNEUMONIAE PCR: NOT DETECTED
NEGATIVE BASE EXCESS, ART: 14 (ref 0–2)
NEGATIVE BASE EXCESS, ART: 9 (ref 0–2)
NEGATIVE BASE EXCESS, ART: 9 (ref 0–2)
NOTIFY: NORMAL
NRBC AUTOMATED: 0 PER 100 WBC
O2 DEVICE/FLOW/%: ABNORMAL
PARAINFLUENZA 1 PCR: NOT DETECTED
PARAINFLUENZA 2 PCR: NOT DETECTED
PARAINFLUENZA 3 PCR: NOT DETECTED
PARAINFLUENZA 4 PCR: NOT DETECTED
PARTIAL THROMBOPLASTIN TIME: 49.5 SEC (ref 20.5–30.5)
PATIENT TEMP: 35.1
PDW BLD-RTO: 14.6 % (ref 11.8–14.4)
PHOSPHATE SERPL-MCNC: 4.5 MG/DL (ref 2.6–4.5)
PHOSPHATE SERPL-MCNC: 5.6 MG/DL (ref 2.6–4.5)
PLATELET # BLD AUTO: 146 K/UL (ref 138–453)
PMV BLD AUTO: 10.7 FL (ref 8.1–13.5)
POC HCO3: 14.7 MMOL/L (ref 21–28)
POC HCO3: 17.8 MMOL/L (ref 21–28)
POC HCO3: 18.8 MMOL/L (ref 21–28)
POC IONIZED CALCIUM: 0.99 MMOL/L (ref 1.15–1.33)
POC LACTIC ACID: 10.44 MMOL/L (ref 0.56–1.39)
POC LACTIC ACID: 13.97 MMOL/L (ref 0.56–1.39)
POC LACTIC ACID: 14.15 MMOL/L (ref 0.56–1.39)
POC O2 SATURATION: 100 % (ref 94–98)
POC O2 SATURATION: 98 % (ref 94–98)
POC O2 SATURATION: 99 % (ref 94–98)
POC PCO2 TEMP: 46 MM HG
POC PCO2: 40.4 MM HG (ref 35–48)
POC PCO2: 44.8 MM HG (ref 35–48)
POC PCO2: 49.9 MM HG (ref 35–48)
POC PH TEMP: 7.21
POC PH: 7.12 (ref 7.35–7.45)
POC PH: 7.18 (ref 7.35–7.45)
POC PH: 7.25 (ref 7.35–7.45)
POC PO2 TEMP: 128 MM HG
POC PO2: 138.8 MM HG (ref 83–108)
POC PO2: 193.8 MM HG (ref 83–108)
POC PO2: 274.3 MM HG (ref 83–108)
POTASSIUM SERPL-SCNC: 4.8 MMOL/L (ref 3.7–5.3)
POTASSIUM SERPL-SCNC: 5.7 MMOL/L (ref 3.7–5.3)
PROT SERPL-MCNC: 3.7 G/DL (ref 6.4–8.3)
PROT SERPL-MCNC: 3.7 G/DL (ref 6.4–8.3)
RBC # BLD: 2.73 M/UL (ref 3.95–5.11)
READ BACK: YES
RESP SYNCYTIAL VIRUS PCR: NOT DETECTED
RHINO/ENTEROVIRUS PCR: NOT DETECTED
SAMPLE SITE: ABNORMAL
SARS-COV-2 RNA NPH QL NAA+NON-PROBE: NOT DETECTED
SODIUM SERPL-SCNC: 137 MMOL/L (ref 135–144)
SODIUM SERPL-SCNC: 139 MMOL/L (ref 135–144)
SPECIMEN DESCRIPTION: NORMAL
SPECIMEN DESCRIPTION: NORMAL
TROPONIN I SERPL DL<=0.01 NG/ML-MCNC: 1782 NG/L (ref 0–14)
TROPONIN I SERPL DL<=0.01 NG/ML-MCNC: 2334 NG/L (ref 0–14)
WBC # BLD AUTO: 35.8 K/UL (ref 3.5–11.3)

## 2023-02-15 PROCEDURE — 0202U NFCT DS 22 TRGT SARS-COV-2: CPT

## 2023-02-15 PROCEDURE — 2720000010 HC SURG SUPPLY STERILE

## 2023-02-15 PROCEDURE — 89220 SPUTUM SPECIMEN COLLECTION: CPT

## 2023-02-15 PROCEDURE — 6360000002 HC RX W HCPCS: Performed by: STUDENT IN AN ORGANIZED HEALTH CARE EDUCATION/TRAINING PROGRAM

## 2023-02-15 PROCEDURE — 2500000003 HC RX 250 WO HCPCS: Performed by: STUDENT IN AN ORGANIZED HEALTH CARE EDUCATION/TRAINING PROGRAM

## 2023-02-15 PROCEDURE — 87040 BLOOD CULTURE FOR BACTERIA: CPT

## 2023-02-15 PROCEDURE — 6370000000 HC RX 637 (ALT 250 FOR IP): Performed by: STUDENT IN AN ORGANIZED HEALTH CARE EDUCATION/TRAINING PROGRAM

## 2023-02-15 PROCEDURE — 82803 BLOOD GASES ANY COMBINATION: CPT

## 2023-02-15 PROCEDURE — 84100 ASSAY OF PHOSPHORUS: CPT

## 2023-02-15 PROCEDURE — 99232 SBSQ HOSP IP/OBS MODERATE 35: CPT | Performed by: SURGERY

## 2023-02-15 PROCEDURE — 82947 ASSAY GLUCOSE BLOOD QUANT: CPT

## 2023-02-15 PROCEDURE — 93325 DOPPLER ECHO COLOR FLOW MAPG: CPT

## 2023-02-15 PROCEDURE — 2580000003 HC RX 258: Performed by: STUDENT IN AN ORGANIZED HEALTH CARE EDUCATION/TRAINING PROGRAM

## 2023-02-15 PROCEDURE — 85347 COAGULATION TIME ACTIVATED: CPT

## 2023-02-15 PROCEDURE — 93308 TTE F-UP OR LMTD: CPT

## 2023-02-15 PROCEDURE — 2500000003 HC RX 250 WO HCPCS

## 2023-02-15 PROCEDURE — 2580000003 HC RX 258: Performed by: INTERNAL MEDICINE

## 2023-02-15 PROCEDURE — 85027 COMPLETE CBC AUTOMATED: CPT

## 2023-02-15 PROCEDURE — 87186 SC STD MICRODIL/AGAR DIL: CPT

## 2023-02-15 PROCEDURE — 99233 SBSQ HOSP IP/OBS HIGH 50: CPT | Performed by: INTERNAL MEDICINE

## 2023-02-15 PROCEDURE — 85730 THROMBOPLASTIN TIME PARTIAL: CPT

## 2023-02-15 PROCEDURE — C1894 INTRO/SHEATH, NON-LASER: HCPCS

## 2023-02-15 PROCEDURE — 83605 ASSAY OF LACTIC ACID: CPT

## 2023-02-15 PROCEDURE — 99291 CRITICAL CARE FIRST HOUR: CPT | Performed by: INTERNAL MEDICINE

## 2023-02-15 PROCEDURE — 2500000003 HC RX 250 WO HCPCS: Performed by: INTERNAL MEDICINE

## 2023-02-15 PROCEDURE — 87205 SMEAR GRAM STAIN: CPT

## 2023-02-15 PROCEDURE — 71045 X-RAY EXAM CHEST 1 VIEW: CPT

## 2023-02-15 PROCEDURE — 37799 UNLISTED PX VASCULAR SURGERY: CPT

## 2023-02-15 PROCEDURE — 87070 CULTURE OTHR SPECIMN AEROBIC: CPT

## 2023-02-15 PROCEDURE — 83735 ASSAY OF MAGNESIUM: CPT

## 2023-02-15 PROCEDURE — 82330 ASSAY OF CALCIUM: CPT

## 2023-02-15 PROCEDURE — 2580000003 HC RX 258

## 2023-02-15 PROCEDURE — 94003 VENT MGMT INPAT SUBQ DAY: CPT

## 2023-02-15 PROCEDURE — 84484 ASSAY OF TROPONIN QUANT: CPT

## 2023-02-15 PROCEDURE — 87077 CULTURE AEROBIC IDENTIFY: CPT

## 2023-02-15 PROCEDURE — 36415 COLL VENOUS BLD VENIPUNCTURE: CPT

## 2023-02-15 PROCEDURE — 2709999900 HC NON-CHARGEABLE SUPPLY

## 2023-02-15 PROCEDURE — 33990 INSJ PERQ VAD L HRT ARTERIAL: CPT

## 2023-02-15 PROCEDURE — 93010 ELECTROCARDIOGRAM REPORT: CPT | Performed by: INTERNAL MEDICINE

## 2023-02-15 PROCEDURE — 6360000002 HC RX W HCPCS: Performed by: INTERNAL MEDICINE

## 2023-02-15 PROCEDURE — 80053 COMPREHEN METABOLIC PANEL: CPT

## 2023-02-15 PROCEDURE — 6360000002 HC RX W HCPCS

## 2023-02-15 PROCEDURE — 6360000004 HC RX CONTRAST MEDICATION

## 2023-02-15 RX ORDER — CALCIUM GLUCONATE 20 MG/ML
1000 INJECTION, SOLUTION INTRAVENOUS ONCE
Status: COMPLETED | OUTPATIENT
Start: 2023-02-15 | End: 2023-02-15

## 2023-02-15 RX ORDER — SODIUM CHLORIDE 0.9 % (FLUSH) 0.9 %
5-40 SYRINGE (ML) INJECTION EVERY 12 HOURS SCHEDULED
Status: DISCONTINUED | OUTPATIENT
Start: 2023-02-15 | End: 2023-02-15 | Stop reason: HOSPADM

## 2023-02-15 RX ORDER — CALCIUM GLUCONATE 20 MG/ML
3000 INJECTION, SOLUTION INTRAVENOUS ONCE
Status: DISCONTINUED | OUTPATIENT
Start: 2023-02-15 | End: 2023-02-15

## 2023-02-15 RX ORDER — ACETAMINOPHEN 325 MG/1
650 TABLET ORAL EVERY 4 HOURS PRN
Status: DISCONTINUED | OUTPATIENT
Start: 2023-02-15 | End: 2023-02-15 | Stop reason: HOSPADM

## 2023-02-15 RX ORDER — SODIUM CHLORIDE 0.9 % (FLUSH) 0.9 %
5-40 SYRINGE (ML) INJECTION PRN
Status: DISCONTINUED | OUTPATIENT
Start: 2023-02-15 | End: 2023-02-15 | Stop reason: HOSPADM

## 2023-02-15 RX ORDER — SODIUM CHLORIDE 9 MG/ML
INJECTION, SOLUTION INTRAVENOUS PRN
Status: DISCONTINUED | OUTPATIENT
Start: 2023-02-15 | End: 2023-02-15 | Stop reason: HOSPADM

## 2023-02-15 RX ORDER — CALCIUM GLUCONATE 20 MG/ML
2000 INJECTION, SOLUTION INTRAVENOUS ONCE
Status: COMPLETED | OUTPATIENT
Start: 2023-02-15 | End: 2023-02-15

## 2023-02-15 RX ADMIN — FUROSEMIDE 20 MG: 10 INJECTION, SOLUTION INTRAMUSCULAR; INTRAVENOUS at 11:12

## 2023-02-15 RX ADMIN — PHENYLEPHRINE HYDROCHLORIDE 300 MCG/MIN: 10 INJECTION INTRAVENOUS at 01:24

## 2023-02-15 RX ADMIN — HEPARIN SODIUM: 5000 INJECTION, SOLUTION INTRAVENOUS; SUBCUTANEOUS at 07:15

## 2023-02-15 RX ADMIN — ACETAMINOPHEN 650 MG: 325 TABLET ORAL at 14:57

## 2023-02-15 RX ADMIN — POTASSIUM CHLORIDE 20 MEQ: 400 INJECTION, SOLUTION INTRAVENOUS at 00:22

## 2023-02-15 RX ADMIN — SODIUM CHLORIDE 1500 MG: 900 INJECTION INTRAVENOUS at 17:38

## 2023-02-15 RX ADMIN — CALCIUM GLUCONATE 2000 MG: 20 INJECTION, SOLUTION INTRAVENOUS at 06:33

## 2023-02-15 RX ADMIN — VASOPRESSIN 0.04 UNITS/MIN: 20 INJECTION PARENTERAL at 05:06

## 2023-02-15 RX ADMIN — SODIUM CHLORIDE, PRESERVATIVE FREE 10 ML: 5 INJECTION INTRAVENOUS at 11:13

## 2023-02-15 RX ADMIN — SODIUM BICARBONATE 50 MEQ: 84 INJECTION INTRAVENOUS at 06:23

## 2023-02-15 RX ADMIN — SODIUM CHLORIDE 1500 MG: 900 INJECTION INTRAVENOUS at 00:15

## 2023-02-15 RX ADMIN — SODIUM CHLORIDE, PRESERVATIVE FREE 20 MG: 5 INJECTION INTRAVENOUS at 11:12

## 2023-02-15 RX ADMIN — POTASSIUM CHLORIDE 20 MEQ: 400 INJECTION, SOLUTION INTRAVENOUS at 00:49

## 2023-02-15 RX ADMIN — MAGNESIUM SULFATE HEPTAHYDRATE 2000 MG: 40 INJECTION, SOLUTION INTRAVENOUS at 00:43

## 2023-02-15 RX ADMIN — ASPIRIN 81 MG: 81 TABLET, CHEWABLE ORAL at 11:11

## 2023-02-15 RX ADMIN — Medication 6 MG/HR: at 05:04

## 2023-02-15 RX ADMIN — SODIUM CHLORIDE, PRESERVATIVE FREE 10 ML: 5 INJECTION INTRAVENOUS at 11:12

## 2023-02-15 RX ADMIN — HEPARIN SODIUM 1600 UNITS: 1000 INJECTION INTRAVENOUS; SUBCUTANEOUS at 02:11

## 2023-02-15 RX ADMIN — NOREPINEPHRINE BITARTRATE 100 MCG/MIN: 1 SOLUTION INTRAVENOUS at 06:05

## 2023-02-15 RX ADMIN — SODIUM BICARBONATE: 84 INJECTION, SOLUTION INTRAVENOUS at 08:53

## 2023-02-15 RX ADMIN — CALCIUM GLUCONATE 1000 MG: 20 INJECTION, SOLUTION INTRAVENOUS at 08:37

## 2023-02-15 RX ADMIN — NOREPINEPHRINE BITARTRATE 60 MCG/MIN: 1 SOLUTION INTRAVENOUS at 12:57

## 2023-02-15 RX ADMIN — TICAGRELOR 90 MG: 90 TABLET ORAL at 11:11

## 2023-02-15 RX ADMIN — VASOPRESSIN 0.03 UNITS/MIN: 20 INJECTION PARENTERAL at 17:04

## 2023-02-15 RX ADMIN — EPINEPHRINE 29 MCG/MIN: 1 INJECTION INTRAMUSCULAR; INTRAVENOUS; SUBCUTANEOUS at 01:06

## 2023-02-15 RX ADMIN — PHENYLEPHRINE HYDROCHLORIDE 300 MCG/MIN: 10 INJECTION INTRAVENOUS at 06:47

## 2023-02-15 RX ADMIN — NOREPINEPHRINE BITARTRATE 100 MCG/MIN: 1 SOLUTION INTRAVENOUS at 00:40

## 2023-02-15 RX ADMIN — SODIUM CHLORIDE 1500 MG: 900 INJECTION INTRAVENOUS at 10:44

## 2023-02-15 RX ADMIN — SODIUM BICARBONATE: 84 INJECTION, SOLUTION INTRAVENOUS at 01:12

## 2023-02-15 RX ADMIN — HEPARIN SODIUM 1500 UNITS: 1000 INJECTION INTRAVENOUS; SUBCUTANEOUS at 02:11

## 2023-02-15 RX ADMIN — PHENYLEPHRINE HYDROCHLORIDE 230 MCG/MIN: 10 INJECTION INTRAVENOUS at 14:20

## 2023-02-15 RX ADMIN — SODIUM BICARBONATE: 84 INJECTION, SOLUTION INTRAVENOUS at 18:10

## 2023-02-15 RX ADMIN — CALCIUM GLUCONATE 1000 MG: 20 INJECTION, SOLUTION INTRAVENOUS at 14:58

## 2023-02-15 RX ADMIN — VASOPRESSIN 0.04 UNITS/MIN: 20 INJECTION PARENTERAL at 00:00

## 2023-02-15 RX ADMIN — DOPAMINE HYDROCHLORIDE 5 MCG/KG/MIN: 160 INJECTION, SOLUTION INTRAVENOUS at 01:58

## 2023-02-15 RX ADMIN — AMIODARONE HYDROCHLORIDE 0.5 MG/MIN: 50 INJECTION, SOLUTION INTRAVENOUS at 05:00

## 2023-02-15 ASSESSMENT — PULMONARY FUNCTION TESTS
PIF_VALUE: 25
PIF_VALUE: 22
PIF_VALUE: 18
PIF_VALUE: 26
PIF_VALUE: 19
PIF_VALUE: 20
PIF_VALUE: 24

## 2023-02-15 NOTE — PROGRESS NOTES
STAT limited Echo at the bedside. Impella rep at the bedside. Cardiology Massey notified of preliminary findings.

## 2023-02-15 NOTE — PROGRESS NOTES
Dr Christopher Linton at bedside prepping patient to remove right femoral arterial sheath. 1558: Arterial sheath removed from right femoral site. Manual pressure held per physician, small amount of bleeding at first. Leg continues to remain cold and mottled. Vitals stable. Manual pressure held per Dr Christopher Linton x 27 minutes. The by [employer] for 25 minutes. Right femoral soft, no bleeding, no hematoma. Gauze 4x4 to site. Pedal pulses and popliteal pulses remain absent, right femoral pulse palpable.

## 2023-02-15 NOTE — CARE COORDINATION
Case Management Assessment  Initial Evaluation    Date/Time of Evaluation: 2/15/2023 4:10 PM  Assessment Completed by: AARON Rutledge    If patient is discharged prior to next notation, then this note serves as note for discharge by case management. Patient Name: Estefani Moore                   YOB: 1975  Diagnosis: Cardiac arrest [facility]) [I46.9]  Other specified hypotension [I95.89]  ARDS (adult respiratory distress syndrome) (HCC) [J80]  Respiratory acidosis [B61.85]  Metabolic acidosis [P18.35]  STEMI (ST elevation myocardial infarction) ([facility] [I21.3]  Hypothermic shock, initial encounter [T68. XXXA]  ST elevation myocardial infarction (STEMI), unspecified artery ([facility] [I21.3]                   Date / Time: 2/14/2023  2:59 AM    Patient Admission Status: Inpatient   Readmission Risk (Low < 19, Mod (19-27), High > 27): Readmission Risk Score: 16.3    Current PCP: KENDALL Hill CNP  PCP verified by CM? Yes    Chart Reviewed: Yes      History Provided by: Child/Family  Patient Orientation: Sedated (intubated/sedated)    Patient Cognition:      Hospitalization in the last 30 days (Readmission):  No    If yes, Readmission Assessment in CM Navigator will be completed.     Advance Directives:      Code Status: Full Code   Patient's Primary Decision Maker is: Legal Next of Kin    Primary Decision Maker (Active): Brenda Segovia Child - [phone]    Primary Decision Maker: Yulisa Bose Child - [phone]    Primary Decision Maker: Doreen Ingram Child - [phone]    Discharge Planning:    Patient lives with: Spouse/Significant Other, Children Type of Home: Other (Comment) (Plan for transfer to [facility])  Primary Care Giver: Self  Patient Support Systems include: Spouse/Significant Other, Children   Current Financial resources: Medicaid  Current community resources:    Current services prior to admission: None            Current DME:              Type of Home Care services:  None    ADLS  Prior functional level: Independent in ADLs/IADLs  Current functional level: Other (see comment) (unable to assess)    PT AM-PAC:   /24  OT AM-PAC:   /24    Family can provide assistance at DC: Yes  Would you like Case Management to discuss the discharge plan with any other family members/significant others, and if so, who? Plans to Return to Present Housing: Unknown at present  Other Identified Issues/Barriers to RETURNING to current housing: If pt able to return home at discharge, daughter plans for her to come to her home so she is able to assist with all needs  Potential Assistance needed at discharge: Other (Comment) (Plan for transfer to [facility])            Potential DME:    Patient expects to discharge to: Unknown  Plan for transportation at discharge:      Financial    Payor: Rinku Horowitz / Plan: Rinku Horowitz / Product Type: *No Product type* /     Does insurance require precert for SNF: Yes    Potential assistance Purchasing Medications: No  Meds-to-Beds request: Yes      [facility] [id number] [place], [address] [place] [address]  [address]  [place] [address]  Phone: [phone] Fax: [phone]      Notes:    Factors facilitating achievement of predicted outcomes: Family support    Barriers to discharge: No barriers identified at this time. Additional Case Management Notes: Plan for transfer to [facility]. Daughter Pelon Nieves and mark Leach Blanca in waiting room at this time    The Plan for Transition of Care is related to the following treatment goals of Cardiac arrest [facility]) [I46.9]  Other specified hypotension [I95.89]  ARDS (adult respiratory distress syndrome) (HCC) [J80]  Respiratory acidosis [M79.75]  Metabolic acidosis [B54.19]  STEMI (ST elevation myocardial infarction) ([facility] [I21.3]  Hypothermic shock, initial encounter [T68. XXXA]  ST elevation myocardial infarction (STEMI), unspecified artery ([facility] [J27.5]    IF APPLICABLE: The Patient and/or patient representative Barb and her family were provided with a choice of provider and agrees with the discharge plan. Freedom of choice list with basic dialogue that supports the patient's individualized plan of care/goals and shares the quality data associated with the providers was provided to:     Patient Representative Name:       The Patient and/or Patient Representative Agree with the Discharge Plan?       AARON Magallon  Case Management Department  Ph:  Fax:

## 2023-02-15 NOTE — PLAN OF CARE
Problem: Safety - Medical Restraint  Goal: Remains free of injury from restraints (Restraint for Interference with Medical Device)  Description: INTERVENTIONS:  1. Determine that other, less restrictive measures have been tried or would not be effective before applying the restraint  2. Evaluate the patient's condition at the time of restraint application  3. Inform patient/family regarding the reason for restraint  4. Q2H: Monitor safety, psychosocial status, comfort, nutrition and hydration  2/15/2023 0921 by Tamie Lim RN  Outcome: Completed  Flowsheets  Taken 2/15/2023 9256 by Erica Petersen RN  Remains free of injury from restraints (restraint for interference with medical device):  Inform patient/family regarding the reason for restraint  Taken 2/15/2023 0200 by Erica Petersen RN  Remains free of injury from restraints (restraint for interference with medical device): Evaluate the patient's condition at the time of restraint application  Taken 5/65/0073 0000 by Erica Petersen RN  Remains free of injury from restraints (restraint for interference with medical device): Determine that other, less restrictive measures have been tried or would not be effective before applying the restraint  Taken 2/14/2023 2200 by Erica Petersen RN  Remains free of injury from restraints (restraint for interference with medical device): Determine that other, less restrictive measures have been tried or would not be effective before applying the restraint  2/14/2023 2040 by Erica Petersen RN  Outcome: Progressing  Flowsheets  Taken 2/14/2023 2000 by Erica Petersen RN  Remains free of injury from restraints (restraint for interference with medical device): Every 2 hours: Monitor safety, psychosocial status, comfort, nutrition and hydration  Taken 2/14/2023 1800 by Mayra Orona RN  Remains free of injury from restraints (restraint for interference with medical device): Every 2 hours: Monitor safety, psychosocial status, comfort, nutrition and hydration

## 2023-02-15 NOTE — PLAN OF CARE
Problem: Safety - Medical Restraint  Goal: Remains free of injury from restraints (Restraint for Interference with Medical Device)  Description: INTERVENTIONS:  1. Determine that other, less restrictive measures have been tried or would not be effective before applying the restraint  2. Evaluate the patient's condition at the time of restraint application  3. Inform patient/family regarding the reason for restraint  4.  Q2H: Monitor safety, psychosocial status, comfort, nutrition and hydration  2/14/2023 2040 by Ghislaine Telles RN  Outcome: Progressing  Flowsheets  Taken 2/14/2023 2000 by Ghislaine Telles RN  Remains free of injury from restraints (restraint for interference with medical device): Every 2 hours: Monitor safety, psychosocial status, comfort, nutrition and hydration  Taken 2/14/2023 1800 by Wilfred Isidro RN  Remains free of injury from restraints (restraint for interference with medical device): Every 2 hours: Monitor safety, psychosocial status, comfort, nutrition and hydration     Problem: Discharge Planning  Goal: Discharge to home or other facility with appropriate resources  2/14/2023 2040 by Ghislaine Telles RN  Outcome: Progressing     Problem: Pain  Goal: Verbalizes/displays adequate comfort level or baseline comfort level  2/14/2023 2040 by Ghislaine Telles RN  Outcome: Progressing     Problem: ABCDS Injury Assessment  Goal: Absence of physical injury  2/14/2023 2040 by Ghsilaine Telles RN  Outcome: Progressing

## 2023-02-15 NOTE — PROGRESS NOTES
[facility]   Progress Note                    Date:   2/15/2023  Patient name:  Fadi Garcia  Date of admission:  2/14/2023  2:59 AM  MRN:   [id number]  YOB: 1975  PCP:    KENDALL Davis CNP    Reason for Admission:  Cardiac arrest [facility]) [I46.9]  Other specified hypotension [I95.89]  ARDS (adult respiratory distress syndrome) ([facility] [J80]  Respiratory acidosis [X11.87]  Metabolic acidosis [C82.39]  STEMI (ST elevation myocardial infarction) ([facility] [I21.3]  Hypothermic shock, initial encounter [T68. XXXA]  ST elevation myocardial infarction (STEMI), unspecified artery ([facility] [address] [I21.3]    Subjective:   Clinical Changes / Abnormalities:    Patient seen and examined by bedside. Intubated and sedated on Versed. Patient currently on Levophed, phenylephrine, vasopressin, epinephrine for vasopressor support. Patient blood pressure was critical overnight patient required placement of Impella device for mechanical support I was able to wean off dopamine afterwards. After transfer back to room, Impella device was alarming noting the device was malpositioned during transport, echocardiogram reviewed confirming malposition. Discussed case with Dr. Marisol Sosa, Impella device was adjusted by bedside with echo tech and an Company rep present by bedside, Impella was adjusted from 108 to 100 for correct positioning. However device continue to alarm possibly due to low volume and patient was given 250 cc bolus in the a.m. Cloteal Camara      Urine output in the last 24 hours:     Intake/Output Summary (Last 24 hours) at 2/15/2023 0929  Last data filed at 2/15/2023 4133  Gross per 24 hour   Intake 9199.75 ml   Output 3120 ml   Net 6079.75 ml       I/O since admission: -9.2 cc    Medications:   Scheduled Meds:   sodium chloride flush  5-40 mL IntraVENous 2 times per day    calcium gluconate-NaCl  1,000 mg IntraVENous Once    sodium chloride flush  5-40 mL IntraVENous 2 times per day    atorvastatin  80 mg Oral Nightly    aspirin  81 mg Oral Daily    ticagrelor  90 mg Oral BID    furosemide  20 mg IntraVENous BID    famotidine (PEPCID) injection  20 mg IntraVENous BID    ampicillin-sulbactam  1,500 mg IntraVENous Q8H     Continuous Infusions:   EPINEPHrine infusion (double concentration) Stopped (02/15/23 0819)    sodium chloride      heparin (Impella Purge) solution 5 mL/hr at 02/15/23 0924    heparin (PORCINE) Infusion 3 Units/kg/hr (02/15/23 0924)    amiodarone 450mg/250ml D5W infusion 0.5 mg/min (02/15/23 0924)    propofol Stopped (02/14/23 0601)    sodium chloride 95 mcg/min (02/14/23 1113)    midazolam 6 mg/hr (02/15/23 0924)    vasopressin (Septic Shock) infusion 0.03 Units/min (02/15/23 0924)    fentaNYL 50 mcg/mL Stopped (02/15/23 0200)    insulin 10 Units/hr (02/14/23 2329)    dextrose      norepinephrine (LEVOPHED) infusion (double concentration) 90 mcg/min (02/15/23 0924)    CRRT dialysis builder 1,500 mL/hr (02/14/23 1852)    IV infusion builder 150 mL/hr at 02/15/23 0853    phenylephrine (IRMA-SYNEPHRINE) 100 mg in sodium chloride 0.9% 250 mL infusion 280 mcg/min (02/15/23 0924)    DOPamine Stopped (02/15/23 0622)     CBC:   Recent Labs     02/14/23  0552 02/14/23  0632 02/15/23  0539   WBC 33.2* 39.6* 35.8*   HGB 10.4* 12.3 9.0*    287 146       BMP:    Recent Labs     02/14/23  1549 02/14/23  1841 02/14/23  2307 02/15/23  0538   *  --  142 137   K 3.2*  --  3.5* 4.8   *  --  105 102   CO2 18*  --  17* 13*   BUN 17  --  15 15   CREATININE 1.55* 1.56* 1.01* 1.19*   GLUCOSE 131*  --  106* 137*       Hepatic:   Recent Labs     02/14/23  0307 02/14/23  1549 02/15/23  0538   * 863* 1,007*   * 366* 563*   BILITOT <0.1* 0.9 1.0   ALKPHOS 50 129* 106*       Troponin: No results for input(s): TROPONINI in the last 72 hours. No results for input(s): TROPONINT in the last 72 hours.   BNP:   Recent Labs     02/14/23  0307   PROBNP 44        No results for input(s): BNP in the last 72 hours. Lipids: No results for input(s): CHOL, HDL in the last 72 hours. Invalid input(s): LDLCALCU  INR:   Recent Labs     02/14/23  0048   INR 1.0         Objective:   Vitals: BP (!) 127/50   Pulse (!) 104   Temp 99.1 °F (37.3 °C)   Resp 30   Ht 5' 5\" (1.651 m)   LMP 04/07/2008   SpO2 97%   BMI 26.63 kg/m²    Last Recorded Weight:  [unfilled]    Constitutional and General Appearance:   intubated  Respiratory:  No for increased work of breathing. On auscultation: diminished bilaterally  ETT in place  Cardiovascular: The apical impulse is not displaced  Heart tones are crisp and normal. Regular S1 and S2. No murmurs. Impella in place  Abdomen:   No masses or tenderness  Bowel sounds present  Extremities:   No Cyanosis or Clubbing   Lower extremity edema: No   Skin: Warm and dry  Neurological:  Intubated and sedated    Diagnostic Studies:     EKG: Anterior STEMI    ECHO:   pending    Cardiac Angiography: 2/14/23   Cardiac Arteries and Lesion Findings     LMCA: Normal 0% stenosis. LAD: mid 70% followed by 95% stenosis with thrombus. The D1 is small. Lesion on Mid LAD: Proximal subsection. 95% stenosis 28 mm length reduced    to 0%. Pre procedure CASSANDRA III flow was noted. Post Procedure CASSANDRA III    flow was present. Good runoff was present. The lesion was diagnosed as    High Risk (C). The lesion was discrete. The lesion showed evidence of    thrombus presence, with irregular contour, mild angulation and mild    tortuosity. Lesion plaque is ruptured. LCx: Normal 0% stenosis. The OM1 is normal.     RCA: Normal 0% stenosis. Coronary Tree Dominance: Right     LV Analysis  LV function assessed as:Abnormal.  Ejection Fraction 20%      Conclusions:   Neisha-infarct Ventricular fibrillation. Anterior STEMI. Acute hypoxic respiratory failure   Acute pulmonary edema   Cardiogenic shock. One vessel coronary artery disease. Severe LV systolic dysfunction; LVEF 20%.    Manual thrombectomy of mid LAD. Successful PTCA/CORNELL of mid LAD. Successful placement of IABP     Recommendations:   Medical therapy as needed. Risk factor modification. Routine Post PCI Orders. TTE. Life vest prior to discharge. Assessment / Acute Cardiac Problems:   Anterior STEMI s/p manual thrombectomy of mid LAD followed by PTCA/CORNELL of mid LAD  Cardiogenic shock s/p IABP. Removal of IABP on 2/15 and placement of Impella on 2/15  Ventricular fibrillation s/p Anterior STEMI on ROSC  Acute hypoxic respiratory failure  Acute pulmonary Edema  Cardiomyopathy LVEF 20% on LV gram, 2D ECHO pending  AMAURI   DLD  HTN  T2DM  GERD  Smoker  Anxiety    Plan of Treatment:   Continue ASA and brilinta  Continue atorvastatin 80 mg nightly  Continue lasix 20 IV BID  Continue amiodarone gtt at 0.5 mg  Currently on levophed gtt, vasopressin, phenylepherine, epinephrine. Wean as tolerated. MAP goal >65. If MAP goal not achieved start vasopressin gtt. On impella device, placed on 2/15. Currently intubated and sedated. Pulmonary/Critical Care on board, appreciate recommendations  Continue fentanyl gtt and versed gtt  Echocardiogram read pending  Continue insulin gtt per Critical Care recommendations  Plan to start BB once Cardiogenic shock resolves  If kidney function does not improve will consider Nephrology consult.   K>4, Mg>2    Arpan Avila MD  Fellow, [id number] [facility] assessment, plan and orders as documented by the resident With changes made to the note.      Electronically signed by Liliana Cheema MD on 2/21/2023 at 4:38 PM.    [facility]      [phone]

## 2023-02-15 NOTE — PROGRESS NOTES
Division of Vascular Surgery             Progress Note      Name: Trey An  MRN: [id number]         Overnight Events:     Patient was taken overnight for Impella placement. Intra-aortic balloon pump was removed however catheter remained in place this morning. Now has no signals in bilateral lower extremities. Continues to have high pressor requirements. Subjective:     Gissel Summers, 78-year-old female who presented with V-fib arrest found to have loss of bilateral lower extremity signals due to bilateral femoral artery catheter size and increasing pressor requirements    Physical Exam:     Vitals:  BP (!) 140/106   Pulse 89   Temp (!) 101.3 °F (38.5 °C)   Resp (!) 33   Ht 5' 5\" (1.651 m)   LMP 04/07/2008   SpO2 100%   BMI 26.63 kg/m²       General appearance -intubated with minimal sedation  Mental status -does not respond, intubated  Head - normocephalic and atraumatic  Neck -right-sided HD catheter in place  Chest -symmetrical rise and fall of chest  Heart -tachycardic, irregular rate  Abdomen - soft, non-tender, non-distended  Neurological -intubated on minimal sedation  Skin -pale and mottled lower extremities  Vascular Exam -palpable bilateral femoral pulses, no DP, PT or popliteal signals bilateral lower extremities      Imaging:     XR CHEST PORTABLE    Result Date: 2/15/2023  EXAMINATION: ONE XRAY VIEW OF THE CHEST 2/15/2023 9:46 am COMPARISON: Chest radiograph performed 02/14/2023. HISTORY: ORDERING SYSTEM PROVIDED HISTORY: Pulmonary edema/ respiratory Failure TECHNOLOGIST PROVIDED HISTORY: Pulmonary edema/ respiratory Failure FINDINGS: There are scattered infiltrates. There is no effusion. There is no pneumothorax. The mediastinal structures are unremarkable. The upper abdomen is unremarkable. The extrathoracic soft tissues are unremarkable. There is endotracheal tube the tip in the midtrachea. There is a gastric tube and the tip is not well visualized.   There is a right internal jugular line with the tip in the proximal SVC. Scattered infiltrates. Support tubes as described above. XR CHEST PORTABLE    Result Date: 2/14/2023  EXAMINATION: ONE XRAY VIEW OF THE CHEST 2/14/2023 5:17 pm COMPARISON: 14 February 2023 at 1049 hours HISTORY: ORDERING SYSTEM PROVIDED HISTORY: Jack catheter placement TECHNOLOGIST PROVIDED HISTORY: Jack catheter placement FINDINGS: AP portable view of the chest time stamped at 1703 hours demonstrates an endotracheal tube terminating 2.4 cm above the cara and intestinal tube extending below the diaphragm. Right-sided Jack type catheter enters from the neck terminating in the distal superior vena cava. Heart size is normal. No change in scattered pulmonary opacities when allowances are made for differences in technique and positioning. Intra-aortic balloon pump for is present over the aortic arch just above the AP window. Devices as above. Extensive scattered pulmonary opacities consistent with multifocal airspace disease. There has been interval placement of a Jack type catheter on the right. No postplacement pneumothorax. XR CHEST PORTABLE    Result Date: 2/14/2023  EXAMINATION: ONE X-RAY VIEW OF THE CHEST 2/14/2023 7:56 am COMPARISON: 02/14/2023 HISTORY: ORDERING SYSTEM PROVIDED HISTORY:  IABP placement, ETT placement. TECHNOLOGIST PROVIDED HISTORY: IABP placement, ETT placement. Reason for Exam:  IABP placement, ETT placement. FINDINGS: The aortic knob is not well visualized secondary to obscuration by airspace disease. With that said, intra-aortic balloon pump marker appears to be just above the AP window. Endotracheal tube is noted, partially withdrawn, with its tip projecting over the lower trachea approximately 2-3 cm above the cara. Nasogastric tube is seen with its tip below the image.  There is continued increased interstitial opacity and pulmonary vascular congestion, similar to decreased when compared to the previous exam. No pneumothorax identified. No free air. The intra-aortic balloon pump marker appears to be just above the AP window (although the aortic knob is not well visualized). Tip of the endotracheal tube is now in expected position approximately 2-3 cm above the cara. XR CHEST PORTABLE    Result Date: 2/14/2023  EXAMINATION: ONE XRAY VIEW OF THE CHEST 2/14/2023 6:16 am COMPARISON: 02/14/2023 HISTORY: ORDERING SYSTEM PROVIDED HISTORY: intubated/IABP placement TECHNOLOGIST PROVIDED HISTORY: intubated/IABP placement FINDINGS: IABP tip projects near the aortic knob, about 3.5 cm from cara. Stable diffuse patchy and interstitial opacities. Endotracheal tube tip is 1 cm from cara. Enteric tube courses out of field-of-view. Stable cardiomediastinal silhouette. No pneumothorax. No pleural effusion. IABP tip projects near the aortic knob, about 3.5 cm from cara. Stable diffuse patchy and interstitial opacities, nonspecific and may be seen with pulmonary edema, infection, or ARDS. Endotracheal tube tip is 1 cm from cara, consider retracting 2-3 cm. Request for the core team to call results to the primary team was made 2/14/2023 at 7:28 am.     XR CHEST PORTABLE    Result Date: 2/14/2023  EXAMINATION: ONE XRAY VIEW OF THE CHEST 2/14/2023 3:10 am COMPARISON: None. HISTORY: ORDERING SYSTEM PROVIDED HISTORY: Shortness of Breath TECHNOLOGIST PROVIDED HISTORY: Shortness of Breath FINDINGS: An endotracheal tube is noted with the distal tip 3 cm above the level of the cara. An enteric tube is present with the distal tip below the level of the GE junction. Extensive areas of patchy and dense opacities are noted throughout the bilateral lungs suggestive of an extensive interstitial and airspace disease process. Extensive areas of patchy and dense opacities are noted throughout the bilateral lungs suggestive of an extensive interstitial and airspace disease process.      XR CHEST PORTABLE    Result Date: 2/14/2023  EXAMINATION: ONE XRAY VIEW OF THE CHEST 2/14/2023 1:52 am COMPARISON: None. HISTORY: ORDERING SYSTEM PROVIDED HISTORY: Code, ET placement TECHNOLOGIST PROVIDED HISTORY: Code, ET placement FINDINGS: And is shaky ule tube is noted with the distal tip 1 cm above the level of the cara. An enteric tube is present with distal tip below the level of the GE junction. Extensive patchy opacities are noted throughout the bilateral lungs and is worse on the right side suggestive of an interstitial pulmonary process. Extensive patchy opacities are noted throughout the bilateral lungs and is worse on the right side suggestive of an interstitial pulmonary process. Support tubes and catheters as noted above. XR ABDOMEN FOR NG/OG/NE TUBE PLACEMENT    Result Date: 2/14/2023  EXAMINATION: ONE SUPINE XRAY VIEW(S) OF THE ABDOMEN 2/14/2023 6:16 am COMPARISON: None. HISTORY: ORDERING SYSTEM PROVIDED HISTORY: Confirmation of course of NG/OG/NE tube and location of tip of tube TECHNOLOGIST PROVIDED HISTORY: Confirmation of course of NG/OG/NE tube and location of tip of tube Portable? ->Yes FINDINGS: Nasogastric tube tip is in the stomach with the side hole probably just past the GE junction. Faintly visualized contrast in the renal collecting systems. Intra-aortic balloon pump noted. Nonspecific nonobstructive bowel gas pattern. Partially visualized lung parenchymal opacities. Nasogastric tube tip is in the stomach        Assessment:     Misael Mac, 64-year-old female who presented with V-fib arrest found to have loss of bilateral lower extremity signals due to bilateral femoral artery catheter size and increasing pressor requirements      Plan:     No acute surgical intervention at this time. Recommend removal of right femoral access site since aortic balloon was removed overnight. Continue to monitor left lower extremity with Impella device in place. Continue heparin drip, supportive care.   Patient is at high risk of limb loss. Tami Herrera MD      [address] [facility],[address] [place] [address] North: [phone]  C: [phone]  Email: [email or web address]
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
## 2023-02-15 NOTE — PROGRESS NOTES
Nephrology Progress Note      SUBJECTIVE      Nephrology consulted yesterday because of severe metabolic acidosis, acute renal injury both stemming from severe cardiogenic shock, contrast exposure. Patient was severely compromised from a hemodynamic standpoint requiring 4 pressor drips yesterday along with balloon pump, Impella device was inserted yesterday with removal of the intra-aortic balloon pump. This morning patient's urine output has significantly improved. Her CRRT was put on hold through the night pending morning evaluation. Currently urine output is around 152 to 50 mL an hour for the last 3 hours. We have been able to get her off the epi, she is down on Santi-Synephrine as well. Still on vaso, levo which have been unchanged. She has developed mottling of the lower extremities laterally right more than left. Vascular following  Patient currently on a bicarbonate drip. Most recent ABG shows a pH of 7.24 with a CO2 of 42, O2 of 263 and a bicarb of 19.7. OBJECTIVE      CURRENT TEMPERATURE:  Temp: 99.1 °F (37.3 °C)  MAXIMUM TEMPERATURE OVER 24HRS:  Temp (24hrs), Av.6 °F (36.4 °C), Min:95.2 °F (35.1 °C), Max:99.1 °F (37.3 °C)    CURRENT RESPIRATORY RATE:  Resp: 30  CURRENT PULSE:  Heart Rate: (!) 104  CURRENT BLOOD PRESSURE:  BP: (!) 127/50  24HR BLOOD PRESSURE RANGE:  Systolic (55JJD), PJJ:722 , Min:82 , SIR:568   ; Diastolic (30WNU), ZEP:24, Min:50, Max:120    24HR INTAKE/OUTPUT:    Intake/Output Summary (Last 24 hours) at 2/15/2023 1008  Last data filed at 2/15/2023 0924  Gross per 24 hour   Intake 9199.75 ml   Output 3105 ml   Net 6094.75 ml     WEIGHT :No data found. PHYSICAL EXAM      GENERAL APPEARANCE: Patient intubated, sedated  SKIN: warm and dry, no rash or erythema  EYES: conjunctivae pale and sclera anicteric  ENT: ETT in place  NECK:   No carotid bruits and no carotid lymphadenopathy . PULMONARY: Somewhat coarse breath sounds on auscultation. Tony Davidson    CADRDIOVASCULAR: S1 and S2 . No rubs , no murmur. ABDOMEN: soft nontender, bowel sounds absent, no organomegaly, no ascites.      EXTREMITIES: + Mottling of both lower extremities right greater than left, no significant pitting edema     CURRENT MEDICATIONS      EPINEPHrine (EPINEPHrine HCL) 1 mg/mL 10 mg in sodium chloride 0.9 % 250 mL infusion, Continuous  sodium chloride flush 0.9 % injection 5-40 mL, 2 times per day  sodium chloride flush 0.9 % injection 5-40 mL, PRN  0.9 % sodium chloride infusion, PRN  acetaminophen (TYLENOL) tablet 650 mg, Q4H PRN  heparin (porcine) 12,500 Units in dextrose 5 % 500 mL infusion (FOR IMPELLA PURGE), Continuous  heparin 25,000 units in dextrose 5 % 250 mL infusion (rate based), Continuous  amiodarone (CORDARONE) 450 mg in dextrose 5 % 250 mL infusion, Continuous  propofol injection, Continuous  sodium chloride flush 0.9 % injection 5-40 mL, 2 times per day  sodium chloride flush 0.9 % injection 5-40 mL, PRN  0.9 % sodium chloride infusion, PRN  atorvastatin (LIPITOR) tablet 80 mg, Nightly  aspirin chewable tablet 81 mg, Daily  ticagrelor (BRILINTA) tablet 90 mg, BID  furosemide (LASIX) injection 20 mg, BID  heparin (porcine) injection 4,000 Units, PRN  heparin (porcine) injection 2,000 Units, PRN  midazolam (VERSED) 1 mg/mL in NS infusion, Continuous  vasopressin 20 Units in sodium chloride 0.9 % 100 mL infusion, Continuous  fentaNYL 50 mcg/mL 50 mL infusion, Continuous  insulin regular (HUMULIN R;NOVOLIN R) 100 Units in sodium chloride 0.9 % 100 mL infusion, Continuous  glucose chewable tablet 16 g, PRN  dextrose bolus 10% 125 mL, PRN   Or  dextrose bolus 10% 250 mL, PRN  glucagon (rDNA) injection 1 mg, PRN  dextrose 10 % infusion, Continuous PRN  norepinephrine (LEVOPHED) 32 mg in sodium chloride 0.9 % 250 mL infusion, Continuous  famotidine (PEPCID) 20 mg in sodium chloride (PF) 0.9 % 10 mL injection, BID  ampicillin-sulbactam (UNASYN) 1,500 mg in sodium chloride 0.9 % 50 mL IVPB (mini-bag), Q8H  prismaSATE BK 0/3.5 5,000 mL with potassium chloride 10 mEq solution, Continuous  sodium bicarbonate 150 mEq in sterile water infusion, Continuous  heparin (porcine) injection 1,600 Units, PRN  heparin (porcine) injection 1,500 Units, PRN  phenylephrine (IRMA-SYNEPHRINE) 100 mg in sodium chloride 0.9 % 250 mL infusion, Continuous  DOPamine (INTROPIN) 400 mg in dextrose 5 % 250 mL infusion, Continuous          LABS      CBC:   Recent Labs     02/14/23  0552 02/14/23  0632 02/15/23  0539   WBC 33.2* 39.6* 35.8*   RBC 3.17* 3.75* 2.73*   HGB 10.4* 12.3 9.0*   HCT 33.3* 39.6 27.6*   .0* 105.6* 101.1   MCH 32.8 32.8 33.0   MCHC 31.2 31.1 32.6   RDW 14.1 13.7 14.6*    287 146   MPV 9.5 9.2 10.7      BMP:   Recent Labs     02/14/23  1549 02/14/23  1841 02/14/23  2307 02/15/23  0538   *  --  142 137   K 3.2*  --  3.5* 4.8   *  --  105 102   CO2 18*  --  17* 13*   BUN 17  --  15 15   CREATININE 1.55* 1.56* 1.01* 1.19*   GLUCOSE 131*  --  106* 137*   CALCIUM 6.9*  --  7.0* 6.5*        PHOSPHORUS:    Recent Labs     02/14/23  1549 02/15/23  0538   PHOS 3.9 5.6*     MAGNESIUM:   Recent Labs     02/14/23  1549 02/14/23  2307 02/15/23  0538   MG 1.6 1.8 2.3     ALBUMIN:   Recent Labs     02/14/23  0307 02/14/23  1549 02/15/23  0538   LABALBU 0.9* 2.9* 2.2*     URINALYSIS:  U/A:   Lab Results   Component Value Date/Time    NITRU NEGATIVE 02/14/2023 07:49 AM    COLORU Yellow 02/14/2023 07:49 AM    PHUR 5.5 02/14/2023 07:49 AM    WBCUA 5 TO 10 02/14/2023 07:49 AM    RBCUA 0 TO 2 02/14/2023 07:49 AM    MUCUS 2+ 12/09/2014 02:29 PM    TRICHOMONAS NOT REPORTED 12/09/2014 02:29 PM    YEAST NOT REPORTED 12/09/2014 02:29 PM    BACTERIA TRACE 12/09/2014 02:29 PM    CLARITYU clear 09/22/2020 01:45 PM    SPECGRAV 1.017 02/14/2023 07:49 AM    LEUKOCYTESUR SMALL 02/14/2023 07:49 AM    UROBILINOGEN Normal 02/14/2023 07:49 AM    BILIRUBINUR NEGATIVE 02/14/2023 07:49 AM    BILIRUBINUR small 09/22/2020 01:45 PM BLOODU trace 09/22/2020 01:45 PM    GLUCOSEU 3+ 02/14/2023 07:49 AM    KETUA NEGATIVE 02/14/2023 07:49 AM    AMORPHOUS 1+ 02/14/2023 07:49 AM         ASSESSMENT      1. AMAURI secondary to ischemic ATN from cardiogenic shock, contrast exposure. Creatinine stable at 1.1. CRRT was discontinued yesterday because she was almost to the point of needing 5 pressors. Likely urine output has steadily improved. 2.  Severe metabolic and respiratory acidosis. Bicarb is 13 this morning. pH was 7.2 earlier. Multiorgan failure secondary to cardiogenic shock  3. Ischemic cardiomyopathy. 4.  Developed mottling of the lower extremities likely secondary to significant vasopressor requirement because of shock. PLAN      1. Change bicarb drip 225 mill an hour, repeat ABG at noon. 2.  Currently is off CRRT. Will follow urine output and labs closely. 3.  Still maximally supported. Luckily it appears as though her pressor requirements have come down some. 4.  Prognosis still quite guarded. Please do not hesitate to call with questions.     This note is created with the assistance of a speech-recognition program. While intending to generate a document that actually reflects the content of the visit, no guarantees can be provided that every mistake has been identified and corrected by editing    Electronically signed by Josefina Roberts MD on 2/15/2023 at 10:08 AM

## 2023-02-15 NOTE — PROGRESS NOTES
Renetta ISRAEL updated impella rep, Major Revering about transfer initiation to [facility]. Also updated about Dr Holm Flight advancing impella cath and now able to increase flow.

## 2023-02-15 NOTE — OP NOTE
[facility]    Impella Insertion Note               Today's Date:  2/15/2023  Patient name:  Gianna Meneses  MRN:   [id number]  YOB: 1975  PCP:    KENDALL Kunz CNP      Procedure:  Impella Insertion, Left coronary angiography     Operators:  Primary: Dr Prema Gillette (Attending Physician    Indications: Hemodynamic support, cardiogenic shock      Procedure:  Consent was implied given the emergent nature of the procedure. Patient was brought to the cath lab. The access area was prepped and draped in sterile fashion. 1% lidocaine was used for local block. The Left femoral artery was cannulated with brisk arterial blood return and a guidewire was passed without resistance. Left coronary angiography performed with JL4 catheter then removed. Dilator was passed over the guidewire prior to the insertion of a 14 Fr sheath. The port was flushed with heparinized saline. The Impella was advanced over guide wire using fluoroscopy guidance. The sheath was sutured into place after being in a satisfactory position. The catheter was secured in place. Proper augmentation was established. Complications: None    Bleeding: minimal      Conclusions:  Widely patent LAD stent with CASSANDRA III flow. Successful placement of Impella through left femoral artery. Leatha Lan MD       Cardiovascular Fellow      Physician Statement  I have discussed the case of Gianna Meneses including pertinent history and exam findings with the resident. I have seen and examined the patient and the key elements of the encounter have been performed by me. I agree with the assessment, plan and orders as documented by the resident With changes made to the note. Procedure performed by me.     Electronically signed by Migdalia He MD on 2/15/2023 at 2:42 PM.    [facility]      [phone]

## 2023-02-15 NOTE — PROGRESS NOTES
Patient transfer to [facility] initiated. Pending call back from CICU team at [address] [place] [address]. Respiratory panel ordered. Facesheet and results to be faxed when obtained to [phone]. Update 3:33 pm:    Had a conference call with the shock team at [facility], patient was accepted for urgent transfer.

## 2023-02-15 NOTE — PROGRESS NOTES
Impella device advanced to 103, echo tech by bedside confirming placement, RN by bedside. Blood pressure stable. No complications.

## 2023-02-15 NOTE — PROGRESS NOTES
PULMONARY & CRITICAL CARE MEDICINE PROGRESS NOTE     Patient:  Paulette Larose  MRN: [id number]  Admit date: 2/14/2023  Primary Care Physician: General Blackwell, APRN - CNP  Consulting Physician: Jessica Mayfield MD  CODE Status: Full Code  LOS: 1    SUBJECTIVE     CHIEF COMPLAINT/REASON FOR INITIAL CONSULT:    Acute hypoxic hypercapnic respiratory failure/cardiogenic shock and pulm edema on mechanical ventilation    BRIEF HOSPITAL COURSE:  The patient is a 50 y.o. female Patient was a cardiac arrest/V-fib arrest presented to [facility] with chest pain found to have anterolateral ST elevation patient was hypoxic hypotensive resuscitated and ROSC was achieved then she had another arrest she was intubated on ventilator and taken to Cath Lab. [facility].  She had PCI/manual thrombectomy done by cardiology LV function was 20%. Patient had intra-aortic balloon pump placed in Cath Lab and she had acute metabolic and respiratory acidosis on ventilator requiring 100% FiO2 and high PEEP chest x-ray consistent with pulm edema, she is on Levophed drip and vasopressin with cardiogenic shock. Elevated lactic acidosis. Urine output is low. When I saw her at the time she was on 2 pressors she was 100% FiO2 PEEP of 12. Initial blood gases was due in the morning was 7.07 with bicarb of 11 and PCO2 of 40 repeated blood gas this morning was 7.0 6/58/64/16. When I was there a repeat blood gas was done pH of 7.0 7, 57 PCO2, PO2 was 70 and bicarb was 17. When I saw the patient patient was on Versed drip just starting a fentanyl drip. Patient was not responsive to vocal commands has a spontaneous respiration I did not see spontaneous movement. INTERVAL HISTORY:  02/15/23  I have reviewed the overnight events, chart reviewed, labs multiple blood gases ventilator settings seen and imaging studies reviewed.   During the course of yesterday patient remained on pressors she was up to 5 pressors and on maximum of those including Santi-Synephrine, Levophed, vasopressin, dopamine and epinephrine this morning she was weaned down on epinephrine and dopamine and she just came off epinephrine currently she is on Levophed Santi-Synephrine and vasopressin. Patient was on intra-aortic balloon pump she developed decreased pulses on the right side and vascular surgery was consulted as she was in severe cardiac shock no option to discontinue balloon pump. Overnight cardiology had inserted Impella device from left groin and intra-aortic balloon pump on right side discontinued. Patient has decreased pulses in both the legs. Patient had decreased urine output yesterday nephrology was consulted she was started on CRRT by nephrology but overnight because of severe shock CRRT was stopped and currently she is off CRRT. She has good urine output overnight now and she had 2390 in last 24 hours. She continued to be acidotic/metabolic acidosis with lactic acidosis. Lactic acid last was 9.9 it was 7.2 earlier. Creatinine jumped up to 1.5-now 1. 19. Potassium is 4.8 bicarbonate is 13. ABG this morning was 7.1 23/44/193/14 0.7 on ventilator setting of PRVC/18/450/12 PEEP/100% FiO2. Her WBC count is elevated 35.8 hemoglobin is 9 hematocrit 27.6 platelet count 412. She is on heparin drip and on amiodarone drip. She is sedated with Versed drip and off fentanyl drip.     Chest x-ray 02/15/2023 as compared to chest x-ray on 02/14/2023 shows bilateral pulmonary infiltrate/edema better clearance on the left side today as compared to right    REVIEW OF SYSTEMS:  Unobtainable from patient due to sedation/mechanical ventilation    OBJECTIVE     Ventilator Settings:  Vent Information  Ventilator ID: [id number]  Equipment Changed: Expiratory Filter, HME  Ventilator Initiate: Yes  Vent Mode: AC/PRVC    VITAL SIGNS:   LAST-  BP (!) 127/50   Pulse (!) 104   Temp 99.1 °F (37.3 °C)   Resp 30   Ht 5' 5\" (1.651 m)   LMP 04/07/2008   SpO2 97%   BMI 26.63 kg/m² 8-24 HR RANGE-  TEMP Temp  Av.6 °F (36.4 °C)  Min: 95.2 °F (35.1 °C)  Max: 99.1 °F (03.1 °C)   BP Systolic (66EBT), LPY:729 , Min:82 , SOV:520      Diastolic (34DNU), TKN:22, Min:50, Max:120     PULSE Pulse  Av.6  Min: 97  Max: 131   RR Resp  Av.9  Min: 17  Max: 37   O2 SAT SpO2  Av.4 %  Min: 97 %  Max: 100 %   OXYGEN DELIVERY No data recorded     SYSTEMIC EXAMINATION:   General appearance - Mechanically ventilated, ill-appearing  Mental status - sedated with Versed drip not responsive  Eyes - pupils equal and reactive sluggish, sclera anicteric, no coronary flex  Mouth -Orally intubated on ventilator  Neck - supple, no significant adenopathy, carotids upstroke normal bilaterally, no bruits  Chest - Breath sounds bilaterally were dimnished to auscultation at bases. There were mild rhonchi no crackles anteriorly no wheezing. Heart -S1-S2 present tachycardic with heart rate of 120  Abdomen - soft, nontender, nondistended, no masses or organomegaly  Neurological - DTR's equivocal not responsive motor and sensory cannot be performed no sponges movement noted.   Extremities - peripheral pulses normal, no pedal edema, cold feet both lower extremities and difficult to palpate pulses mild cyanosis present  Skin - no rashes, no suspicious skin lesions noted     DATA REVIEW     Medications: Current Inpatient  Scheduled Meds:   sodium chloride flush  5-40 mL IntraVENous 2 times per day    calcium gluconate-NaCl  1,000 mg IntraVENous Once    sodium chloride flush  5-40 mL IntraVENous 2 times per day    atorvastatin  80 mg Oral Nightly    aspirin  81 mg Oral Daily    ticagrelor  90 mg Oral BID    furosemide  20 mg IntraVENous BID    famotidine (PEPCID) injection  20 mg IntraVENous BID    ampicillin-sulbactam  1,500 mg IntraVENous Q8H     Continuous Infusions:   EPINEPHrine infusion (double concentration) Stopped (02/15/23 0819)    sodium chloride      heparin (Impella Purge) solution 5 mL/hr at 02/15/23 7748    heparin (PORCINE) Infusion 3 Units/kg/hr (02/15/23 4945)    amiodarone 450mg/250ml D5W infusion 0.5 mg/min (02/15/23 0924)    propofol Stopped (02/14/23 0601)    sodium chloride 95 mcg/min (02/14/23 1113)    midazolam 6 mg/hr (02/15/23 0924)    vasopressin (Septic Shock) infusion 0.03 Units/min (02/15/23 0924)    fentaNYL 50 mcg/mL Stopped (02/15/23 0200)    insulin 10 Units/hr (02/14/23 2329)    dextrose      norepinephrine (LEVOPHED) infusion (double concentration) 90 mcg/min (02/15/23 0924)    CRRT dialysis builder 1,500 mL/hr (02/14/23 1852)    IV infusion builder 150 mL/hr at 02/15/23 0853    phenylephrine (IRMA-SYNEPHRINE) 100 mg in sodium chloride 0.9% 250 mL infusion 280 mcg/min (02/15/23 0924)    DOPamine Stopped (02/15/23 0622)     INPUT/OUTPUT:  In: 9425.5 [I.V.:8751.8; NG/GT:60]  Out: 1597 [Urine:2840]  Date 02/15/23 0000 - 02/15/23 2359   Shift 9129-2284 6468-2203 2552-5838 24 Hour Total   INTAKE   I.V. 2832.3 728. 4  3560.7   IV Piggyback 268.4 125.5  393.9   Shift Total 3100.7 854  3954.6   OUTPUT   Urine 1145 450  1595   Emesis/NG output 350   350   CRRT 0   0   Shift Total 1495 450  1945   Weight (kg)           LABS:-  ABG:   Recent Labs     02/14/23  0844 02/14/23  1231 02/14/23  1841 02/15/23  0100 02/15/23  0506   POCPH 7.068* 7.033* 7.274* 7.184* 7.123*   POCPCO2 57.3* 53.8* 42.5 49.9* 44.8   POCPO2 70.3* 147.3* 263.1* 138.8* 193.8*   POCHCO3 16.5* 14.3* 19.7* 18.8* 14.7*   SUTW9HOG 85* 98 100* 98 99*     CBC:   Recent Labs     02/14/23  0048 02/14/23  0552 02/14/23  0632 02/15/23  0539   WBC 11.1 33.2* 39.6* 35.8*   HGB 13.3 10.4* 12.3 9.0*   HCT 38.1 33.3* 39.6 27.6*   MCV 96.9 105.0* 105.6* 101.1    249 287 146   LYMPHOPCT 40  --   --   --    RBC 3.93* 3.17* 3.75* 2.73*   MCH 33.8* 32.8 32.8 33.0   MCHC 34.9* 31.2 31.1 32.6   RDW 13.2 14.1 13.7 14.6*     BMP:   Recent Labs     02/14/23  1549 02/14/23  1841 02/14/23  2307 02/15/23  0538   *  --  142 137   K 3.2*  --  3.5* 4.8   *  --  105 102   CO2 18*  --  17* 13*   BUN 17  --  15 15   CREATININE 1.55* 1.56* 1.01* 1.19*   GLUCOSE 131*  --  106* 137*   PHOS 3.9  --   --  5.6*     Liver Function Test:   Recent Labs     02/15/23  0538   PROT 3.7*   LABALBU 2.2*   *   AST 1,007*   ALKPHOS 106*   BILITOT 1.0     Amylase/Lipase:  No results for input(s): AMYLASE, LIPASE in the last 72 hours. Coagulation Profile:   Recent Labs     02/14/23  0048 02/14/23  0632 02/14/23  1455 02/14/23  2308 02/15/23  0725   INR 1.0  --   --   --   --    PROTIME 12.6  --   --   --   --    APTT  --    < > 93.5* 82.5* 49.5*    < > = values in this interval not displayed. Cardiac Enzymes:  No results for input(s): CKTOTAL, CKMB, CKMBINDEX, TROPONINI in the last 72 hours. Lactic Acid:  No results found for: LACTA  BNP:   No results found for: BNP  D-Dimer:  Lab Results   Component Value Date    DDIMER 0.34 02/06/2023     Others:   Lab Results   Component Value Date    TSH 7.29 (H) 02/14/2023     No results found for: PRICE, RHEUMFACTOR, SEDRATE, CRP  Lab Results   Component Value Date    URICACID 3.9 05/13/2022     Lab Results   Component Value Date    IRON 91 02/10/2021    TIBC 277 02/10/2021     No results found for: SPEP, UPEP  No results found for: PSA, CEA, , ME8439,     Microbiology:  Recent Labs     02/14/23  0749   SPECDESC . URINE,STRAIGHT CATHETER   CULTURE NO GROWTH       Pathology:    Radiology Reports:  XR CHEST PORTABLE   Final Result   Devices as above. Extensive scattered pulmonary opacities consistent with   multifocal airspace disease. There has been interval placement of a Jack   type catheter on the right. No postplacement pneumothorax. XR CHEST PORTABLE   Final Result   The intra-aortic balloon pump marker appears to be just above the AP window   (although the aortic knob is not well visualized). Tip of the endotracheal tube is now in expected position approximately 2-3 cm   above the cara. XR CHEST PORTABLE   Final Result   IABP tip projects near the aortic knob, about 3.5 cm from cara. Stable diffuse patchy and interstitial opacities, nonspecific and may be seen   with pulmonary edema, infection, or ARDS. Endotracheal tube tip is 1 cm from cara, consider retracting 2-3 cm. Request for the core team to call results to the primary team was made   2/14/2023 at 7:28 am.         XR ABDOMEN FOR NG/OG/NE TUBE PLACEMENT   Final Result   Nasogastric tube tip is in the stomach         XR CHEST PORTABLE   Final Result   Extensive areas of patchy and dense opacities are noted throughout the   bilateral lungs suggestive of an extensive interstitial and airspace disease   process. CT HEAD WO CONTRAST    (Results Pending)   CT CHEST PULMONARY EMBOLISM W CONTRAST    (Results Pending)   CT ABDOMEN W CONTRAST Additional Contrast? None    (Results Pending)       Echocardiogram:   No results found for this or any previous visit. Cardiac Catheterization:   No results found for this or any previous visit. ASSESSMENT AND PLAN     Assessment:    // ST elevation MI.  //Cardiac arrest/V-fib arrest  //Status post PCI/manual thrombectomy LAD.  //Severe cardiogenic shock status post IABP on Impella device now  //Acute hypercapnic and hypoxic respiratory failure secondary to above.  //Severe LV dysfunction with EF of 20%. //Lactic acidosis secondary to cardiogenic shock and hypoperfusion.  //Cardiogenic pulm edema  //AMAURI requiring CRRT  //Leukocytosis    Plan:    I personally evaluated and examined the patient; reviewed interval history, interpreted all available radiographic and laboratory data at the time of service. Patient currently sedated with Versed drip off fentanyl drip  Patient is currently on hemodynamic support with Impella device and she is on Levophed, Santi-Synephrine and vasopressin off epi and dopamine drip. Ventilator setting reviewed adjustment done.   Recommend to continue to follow ABG every 6 hours and follow-up lactic acid every 6 hours as she was not 5 pressors expected lactic acid to be elevated especially with severe cardiogenic shock and bilateral lower leg decreased pulses. Continue antiplatelet, anticoagulation, statins per cardiology  Follow-up with vascular surgery and nephrology. Continue to follow urine output currently urine output is good she is off CRRT. She is on bicarbonate drip 150 mill could not at 150 an hour with a straight water. Adjustment of bicarbonate drip per nephrology especially with CRRT. Continue to monitor I/O with a goal of even/negative fluid balance  Continue lung protective mechanical ventilation, appropriate changes made in ventilator settings  Continue to monitor blood sugar she is currently off insulin drip  Stress ulcer prophylaxis with PPI  Continue to monitor CBC, coagulation profile, transfuse as indicated   Chemical DVT prophylaxis on therapeutic heparin drip  Antimicrobials reviewed; continue Unasyn. Recommend blood cultures and continue follow-up WBC count      All concerns/questions were addressed to the best of my abilities. The patient is/remains critically ill with illness/injury that acutely impairs one or more vital organ systems, such that there is a high probability of imminent or life threatening deterioration in the patient's condition. Critical care time of greater than 35 minutes was spent (excluding procedures), in coordination of care during bedside rounds and discussion of patient care in detail, and recommendations of the team were adopted in the plan. Necessity of all invasive devices was also confirmed. Will Cid MD, M.D. Pulmonary and Critical Care Medicine           2/15/2023, 9:36 AM    Please note that this chart was generated using voice recognition Dragon dictation software.   Although every effort was made to ensure the accuracy of this automated transcription, some errors in transcription may have occurred.

## 2023-02-16 NOTE — PROGRESS NOTES
Pt discharged from unit via Life flight team; impella secured to stretcher. All medication drips taken via pump and life flight team will return impella and IV pumps back to Car 1. At the time of discharge patient with full bag of vaso, Santi (additional bag in case), Amio (additional bag due to other close to end), levo (with additional bag), Versed (approximately 15-20ml left in bag/tubing/gtt chamber - unable to waste due to rest of versed gtt left with life flight team), full bag of heparin, and full bag of sodium bicarb.

## 2023-02-16 NOTE — PROGRESS NOTES
Renetta ISRAEL spoke with [email or web address] and informed him about transferring to [facility].

## 2023-02-16 NOTE — PROGRESS NOTES
Renetta ISRAEL called to [employer] with [employer] about transfer time. Stated he was already in contact with the clinic, rep, perfusionist, surgeon there.

## 2023-02-16 NOTE — PROGRESS NOTES
1935: Renetta ISRAEL called [facility], spoke with oncoming nurse, Madeline Qurios that will be on the receiving end to take patient. Full detailed report given, reviewed pt history, admission history, assessment, impella, labs, LDA's. No additional questions.

## 2023-02-16 NOTE — PROGRESS NOTES
Kassy and Heather Hung (patient's children) at bedside, waiting for patient to be transferred. Updated them on process and what unit/bed number that pt will be going to and accepting physician.

## 2023-02-16 NOTE — PROGRESS NOTES
Dr Jyoti John spoke with pt's children Kassy and Radha Ortiz about patient's condition, change in status, limb complications and discussed transferring to [facility] for an impella to be placed in the axillary site due to high risk for limb loss.

## 2023-02-17 NOTE — DISCHARGE SUMMARY
[facility]  Discharge Note                 Name:  Stanton Khan  YOB: 1975  Social Security Number:  [id number]  Medical Record Number:  [id number]    Date of Admission:  2/14/2023  Date of Discharge:  2/15/2023    Admitting physician: Ashley Cantu MD    Discharge Attending: Marie Argueta MD, MD  Primary Care Physician: Nataliia Suarez APRN - CNP  Consultants: Cardiology, vascular surgery  Discharge to To a [facility] facility [[facility]]  [facility] LIST:  Patient Active Problem List   Diagnosis    Insomnia    Depression    Anxiety associated with depression    Tobacco abuse    Dyslipidemia    Dysphagia    Family history of throat cancer    Gastroesophageal reflux disease    H/O: hysterectomy    Mild intermittent asthma without complication    Condylomata acuminata in female    Stress    LAYLA III (vulvar intraepithelial neoplasia III)    JESUS (generalized anxiety disorder)    STEMI (ST elevation myocardial infarction) ([address]    Cardiac arrest ([address]    Acute renal failure with tubular necrosis ([address]    Critical limb ischemia of right lower extremity ([address]    AMAURI (acute kidney injury) ([address]    Lactic acidosis    Respiratory acidosis    Cardiogenic shock ([address]    Acute respiratory failure with hypoxia and hypercapnia (HCC)    Cardiogenic pulmonary edema (HCC)    Hyperglycemia    Coronary artery disease involving native coronary artery of native heart with unstable angina pectoris (HCC)    Leukocytosis         Procedures:cardiac catheterization, echocardiogram    HOSPITAL COURSE :           The patient was admitted for: Anterior STEMI  Hospital Procedures if any: Cardiac cath with CORNELL to mid LAD complicated by cardiogenic shock with placement of IABP, on 5 vasopressors, with subsequent severely diminished lower extremity circulation, with renal failure, liver failure.  Placement of Impella CP via left groin, leading to less pressor support but worsening lower extremity circulation. Patient was accepted transfer to [address] [place] [address]. Medications changes recommendation: Please see below        Discharge exam:   Vitals:    02/15/23 1830   BP:    Pulse: 75   Resp: (!) 33   Temp:    SpO2:      Neuro: sedated  Chest: Clear to ausculation. No wheezing. intubated  Cardiac: Cor RRR  Abdomen/groin: soft, non-tender, without masses or organomegaly  Lower extremity edema: none. Peripheral pulsation 0+     Follow up with primary care provider 1 week  Follow up with cardiology 4 weeks  Follow up with other consultant physicians at their directions. Discharge Medications:     Medication List        ASK your doctor about these medications      albuterol sulfate  (90 Base) MCG/ACT inhaler  Commonly known as: Ventolin HFA  Inhale 2 puffs into the lungs every 6 hours as needed for Wheezing     clonazePAM 1 MG tablet  Commonly known as: KLONOPIN  Take 1 tablet by mouth 2 times daily as needed for Anxiety for up to 30 days. estrogens (conjugated) 0.625 MG tablet  Commonly known as: Premarin  Take 1 tablet by mouth daily     QUEtiapine 50 MG extended release tablet  Commonly known as: SEROQUEL XR  Take 1 tablet by mouth nightly             No current facility-administered medications for this encounter. Coronary Discharge Core Measure: Please indicate the medication given by X, and if not the reasons not given:    Not Given Reason  Given     Cardiogenic shock Beta Blockers      Cardiogenic shock ACE-I       Statins x      ASA x      OAP (Plavix/Effient/Brilinta) x           Discussed with patient and nursing. Medications and discharge instructions reviewed with patient and nursing.     Electronically signed by Gem Gill MD on 2/17/2023 at [facility] student/resident/fellow. I have seen and examined the patient and the key elements of the encounter have been performed by me. I agree with the assessment, plan and orders as documented by the resident With changes made to the note.      Electronically signed by Renee Moctezuma MD on 2/21/2023 at 4:39 PM.    [facility]      [phone]

## 2023-02-18 LAB
MICROORGANISM SPEC CULT: ABNORMAL
MICROORGANISM SPEC CULT: ABNORMAL
MICROORGANISM/AGENT SPEC: ABNORMAL
SPECIMEN DESCRIPTION: ABNORMAL

## 2023-02-19 LAB
MICROORGANISM SPEC CULT: NORMAL
MICROORGANISM SPEC CULT: NORMAL
SERVICE CMNT-IMP: NORMAL
SERVICE CMNT-IMP: NORMAL
SPECIMEN DESCRIPTION: NORMAL
SPECIMEN DESCRIPTION: NORMAL

## 2023-02-20 LAB
MICROORGANISM SPEC CULT: NORMAL
MICROORGANISM SPEC CULT: NORMAL
SERVICE CMNT-IMP: NORMAL
SERVICE CMNT-IMP: NORMAL
SPECIMEN DESCRIPTION: NORMAL
SPECIMEN DESCRIPTION: NORMAL

## (undated) DEVICE — PREP PAD BNS: Brand: CONVERTORS

## (undated) DEVICE — TOWEL,OR,DSP,ST,BLUE,STD,4/PK,20PK/CS: Brand: MEDLINE

## (undated) DEVICE — GAUZE,SPONGE,4"X4",16PLY,XRAY,STRL,LF: Brand: MEDLINE

## (undated) DEVICE — PAD,ABDOMINAL,8"X10",ST,LF: Brand: MEDLINE

## (undated) DEVICE — PLUMEPORT LAPAROSCOPIC SMOKE FILTRATION DEVICE: Brand: PLUMEPORT ACTIV

## (undated) DEVICE — PAD N ADH W3XL4IN POLY COT SFT PERF FLM EASILY CUT ABSRB

## (undated) DEVICE — SOLUTION SURG PREP ANTIMICROBIAL 4 OZ SKIN WND EXIDINE

## (undated) DEVICE — COUNTER NDL 40 COUNT HLD 70 FOAM BLK ADH W/ MAG

## (undated) DEVICE — GOWN,AURORA,NON-REINFORCED,2XL: Brand: MEDLINE

## (undated) DEVICE — GLOVE ORANGE PI 8   MSG9080

## (undated) DEVICE — RENTAL LASER CO2 CASE

## (undated) DEVICE — NEEDLE HYPO 25GA L1.5IN BLU POLYPR HUB S STL REG BVL STR

## (undated) DEVICE — PEN: MARKING STD 100/CS: Brand: MEDICAL ACTION INDUSTRIES

## (undated) DEVICE — SUTURE VCRL SZ 2-0 L27IN ABSRB UD L26MM SH 1/2 CIR J417H

## (undated) DEVICE — SUTURE PERMA-HAND SZ 2-0 L30IN NONABSORBABLE BLK L26MM SH K833H

## (undated) DEVICE — SYRINGE MED 10ML LUERLOCK TIP W/O SFTY DISP

## (undated) DEVICE — PACK,LITHOTOMY,SEPARATE: Brand: MEDLINE

## (undated) DEVICE — COVER LT HNDL BLU PLAS

## (undated) DEVICE — PREMIUM DRY TRAY LF: Brand: MEDLINE INDUSTRIES, INC.

## (undated) DEVICE — ELECTRODE PT RET AD L9FT HI MOIST COND ADH HYDRGEL CORDED

## (undated) DEVICE — TOWEL,OR,DSP,ST,NATURAL,DLX,4/PK,20PK/CS: Brand: MEDLINE

## (undated) DEVICE — UNDERPANTS INCONT XL 45-70IN KNIT SEAMLESS DSGN COLOR-CODED

## (undated) DEVICE — ELECTRODE ELECSURG NDL 2.8 INX7.2 CM COAT INSUL EDGE